# Patient Record
Sex: FEMALE | Race: WHITE | Employment: OTHER | URBAN - NONMETROPOLITAN AREA
[De-identification: names, ages, dates, MRNs, and addresses within clinical notes are randomized per-mention and may not be internally consistent; named-entity substitution may affect disease eponyms.]

---

## 2017-01-03 ENCOUNTER — OFFICE VISIT (OUTPATIENT)
Dept: FAMILY MEDICINE CLINIC | Age: 67
End: 2017-01-03

## 2017-01-03 VITALS
SYSTOLIC BLOOD PRESSURE: 132 MMHG | DIASTOLIC BLOOD PRESSURE: 88 MMHG | RESPIRATION RATE: 14 BRPM | WEIGHT: 211 LBS | HEART RATE: 72 BPM | BODY MASS INDEX: 37.39 KG/M2 | TEMPERATURE: 98.9 F | HEIGHT: 63 IN

## 2017-01-03 DIAGNOSIS — Z01.818 PRE-OP EXAM: ICD-10-CM

## 2017-01-03 DIAGNOSIS — Z86.718 HISTORY OF DVT (DEEP VEIN THROMBOSIS): ICD-10-CM

## 2017-01-03 DIAGNOSIS — M17.11 PRIMARY OSTEOARTHRITIS OF RIGHT KNEE: Primary | ICD-10-CM

## 2017-01-03 PROCEDURE — 99213 OFFICE O/P EST LOW 20 MIN: CPT | Performed by: FAMILY MEDICINE

## 2017-01-03 ASSESSMENT — PATIENT HEALTH QUESTIONNAIRE - PHQ9
SUM OF ALL RESPONSES TO PHQ QUESTIONS 1-9: 0
2. FEELING DOWN, DEPRESSED OR HOPELESS: 0
SUM OF ALL RESPONSES TO PHQ9 QUESTIONS 1 & 2: 0
1. LITTLE INTEREST OR PLEASURE IN DOING THINGS: 0

## 2017-05-24 RX ORDER — CITALOPRAM 20 MG/1
TABLET ORAL
Qty: 90 TABLET | Refills: 5 | Status: SHIPPED | OUTPATIENT
Start: 2017-05-24 | End: 2018-06-20 | Stop reason: SDUPTHER

## 2017-10-04 ENCOUNTER — HOSPITAL ENCOUNTER (OUTPATIENT)
Dept: MAMMOGRAPHY | Age: 67
Discharge: HOME | End: 2017-10-04
Payer: MEDICARE

## 2017-10-04 DIAGNOSIS — Z12.9 SCREENING FOR CANCER: ICD-10-CM

## 2017-10-04 PROCEDURE — G0202 SCR MAMMO BI INCL CAD: HCPCS

## 2017-10-05 ENCOUNTER — TELEPHONE (OUTPATIENT)
Dept: FAMILY MEDICINE CLINIC | Age: 67
End: 2017-10-05

## 2017-10-05 NOTE — TELEPHONE ENCOUNTER
----- Message from Siri DO Jessica sent at 10/5/2017  8:26 AM EDT -----  Normal mammogram, repeat in 1-2 years.

## 2017-10-16 ENCOUNTER — OFFICE VISIT (OUTPATIENT)
Dept: FAMILY MEDICINE CLINIC | Age: 67
End: 2017-10-16
Payer: MEDICARE

## 2017-10-16 ENCOUNTER — TELEPHONE (OUTPATIENT)
Dept: FAMILY MEDICINE CLINIC | Age: 67
End: 2017-10-16

## 2017-10-16 VITALS
SYSTOLIC BLOOD PRESSURE: 130 MMHG | WEIGHT: 208 LBS | BODY MASS INDEX: 36.86 KG/M2 | TEMPERATURE: 98.5 F | HEIGHT: 63 IN | HEART RATE: 55 BPM | RESPIRATION RATE: 12 BRPM | DIASTOLIC BLOOD PRESSURE: 76 MMHG

## 2017-10-16 DIAGNOSIS — M65.4 DE QUERVAIN'S TENOSYNOVITIS, LEFT: Primary | ICD-10-CM

## 2017-10-16 PROCEDURE — 4004F PT TOBACCO SCREEN RCVD TLK: CPT | Performed by: FAMILY MEDICINE

## 2017-10-16 PROCEDURE — 1090F PRES/ABSN URINE INCON ASSESS: CPT | Performed by: FAMILY MEDICINE

## 2017-10-16 PROCEDURE — 1123F ACP DISCUSS/DSCN MKR DOCD: CPT | Performed by: FAMILY MEDICINE

## 2017-10-16 PROCEDURE — G8427 DOCREV CUR MEDS BY ELIG CLIN: HCPCS | Performed by: FAMILY MEDICINE

## 2017-10-16 PROCEDURE — 4040F PNEUMOC VAC/ADMIN/RCVD: CPT | Performed by: FAMILY MEDICINE

## 2017-10-16 PROCEDURE — G8484 FLU IMMUNIZE NO ADMIN: HCPCS | Performed by: FAMILY MEDICINE

## 2017-10-16 PROCEDURE — 3017F COLORECTAL CA SCREEN DOC REV: CPT | Performed by: FAMILY MEDICINE

## 2017-10-16 PROCEDURE — 3014F SCREEN MAMMO DOC REV: CPT | Performed by: FAMILY MEDICINE

## 2017-10-16 PROCEDURE — G8400 PT W/DXA NO RESULTS DOC: HCPCS | Performed by: FAMILY MEDICINE

## 2017-10-16 PROCEDURE — 99213 OFFICE O/P EST LOW 20 MIN: CPT | Performed by: FAMILY MEDICINE

## 2017-10-16 PROCEDURE — G8417 CALC BMI ABV UP PARAM F/U: HCPCS | Performed by: FAMILY MEDICINE

## 2017-10-16 RX ORDER — PREDNISONE 20 MG/1
40 TABLET ORAL DAILY
Qty: 10 TABLET | Refills: 0 | Status: SHIPPED | OUTPATIENT
Start: 2017-10-16 | End: 2017-10-21

## 2017-10-16 RX ORDER — PREDNISONE 20 MG/1
40 TABLET ORAL DAILY
Qty: 10 TABLET | Refills: 0 | Status: SHIPPED | OUTPATIENT
Start: 2017-10-16 | End: 2017-10-16 | Stop reason: SDUPTHER

## 2017-10-16 NOTE — TELEPHONE ENCOUNTER
Pt informed. appt scheduled.   Future Appointments  Date Time Provider Betty Milton   10/16/2017 11:20 AM Sly Ha, [address] [place] [address]

## 2017-10-16 NOTE — TELEPHONE ENCOUNTER
Patient is calling stating she has hurt her left wrist while lifting flower pots she states it is swollen a little, she is wearing a wrist brace and says it does feel better if she has that on, and was not sure if she should come in and see Dr. Jose Christiansen or go to [facility].  Please advise to patient at [phone]

## 2017-10-16 NOTE — PATIENT INSTRUCTIONS
account. Enter E449 in the [facility] box to learn more about \"De Quervain's Tenosynovitis: Care Instructions. \"     If you do not have an account, please click on the \"Sign Up Now\" link. Current as of: May 23, 2016  Content Version: 11.3  © 8176-0520 [employer], [employer]book. Care instructions adapted under license by [employer] [facility]1006.tv [facility] ([facility]). If you have questions about a medical condition or this instruction, always ask your healthcare professional. [place] [address] any warranty or liability for your use of this information. Patient Education        Chilo Johnson Disease: Exercises  Your Care Instructions  Here are some examples of typical rehabilitation exercises for your condition. Start each exercise slowly. Ease off the exercise if you start to have pain. Your doctor or your physical or occupational therapist will tell you when you can start these exercises and which ones will work best for you. How to do the exercises  Thumb lifts    1. Place your hand on a flat surface, with your palm up. 2. Lift your thumb away from your palm to make a \"C\" shape. 3. Hold for about 6 seconds. 4. Repeat 8 to 12 times. Passive thumb MP flexion    1. Hold your hand in front of you, and turn your hand so your little finger faces down and your thumb faces up. (Your hand should be in the position used for shaking someone's hand.) You may also rest your hand on a flat surface. 2. Use the fingers on your other hand to bend your thumb down at the point where your thumb connects to your palm. 3. Hold for at least 15 to 30 seconds. 4. Repeat 2 to 4 times. Finkelstein stretch    1. Hold your arms out in front of you. (Your hand should be in the position used for shaking someone's hand.)  2. Bend your thumb toward your palm.   3. Use your other hand to gently stretch your thumb and wrist downward until you feel the stretch on the thumb side of your wrist.  4. Hold for at least 15 to 30 seconds. 5. Repeat 2 to 4 times. Resisted ulnar deviation    Note: For this exercise, you will need elastic exercise material, such as surgical tubing or Thera-Band. 1. Sit leaning forward with your legs slightly spread and your elbow on your thigh. 2. Grasp one end of the band with your palm down, and step on the other end with the foot opposite the hand holding the band. 3. Slowly bend your wrist sideways and away from your knee. 4. Repeat 8 to 12 times. Follow-up care is a key part of your treatment and safety. Be sure to make and go to all appointments, and call your doctor if you are having problems. It's also a good idea to know your test results and keep a list of the medicines you take. Where can you learn more? Go to [email or web address]://[email or web address].Mobiplex. org and sign in to your Next[employer] account. Enter V976 in the [email or web address] box to learn more about \"De Quervain's Disease: Exercises. \"     If you do not have an account, please click on the \"Sign Up Now\" link. Current as of: March 21, 2017  Content Version: 11.3  © 5454-4658 [employer]. Care instructions adapted under license by [facility] ([facility]). If you have questions about a medical condition or this instruction, always ask your healthcare professional. [address] any warranty or liability for your use of this information.

## 2017-10-19 NOTE — TELEPHONE ENCOUNTER
Placed call to [address] [place] [address] at [phone] and spoke with pharmacy tech. She states that the pt does not have medication drug coverage on file at the pharmacy. Paid $5 out of pocket for medication. PA no longer needed. Encounter closed.  10/19/17

## 2018-06-20 ENCOUNTER — OFFICE VISIT (OUTPATIENT)
Dept: FAMILY MEDICINE CLINIC | Age: 68
End: 2018-06-20
Payer: MEDICARE

## 2018-06-20 VITALS
HEIGHT: 63 IN | TEMPERATURE: 98.8 F | DIASTOLIC BLOOD PRESSURE: 74 MMHG | RESPIRATION RATE: 12 BRPM | SYSTOLIC BLOOD PRESSURE: 132 MMHG | HEART RATE: 80 BPM | BODY MASS INDEX: 37.92 KG/M2 | WEIGHT: 214 LBS

## 2018-06-20 DIAGNOSIS — R22.9 SKIN MASS: Primary | ICD-10-CM

## 2018-06-20 DIAGNOSIS — F41.9 ANXIETY: ICD-10-CM

## 2018-06-20 DIAGNOSIS — D49.2 NEOPLASM OF UNSPECIFIED BEHAVIOR OF BONE, SOFT TISSUE, AND SKIN: ICD-10-CM

## 2018-06-20 PROCEDURE — 11306 SHAVE SKIN LESION 0.6-1.0 CM: CPT | Performed by: FAMILY MEDICINE

## 2018-06-20 PROCEDURE — 1090F PRES/ABSN URINE INCON ASSESS: CPT | Performed by: FAMILY MEDICINE

## 2018-06-20 PROCEDURE — 1123F ACP DISCUSS/DSCN MKR DOCD: CPT | Performed by: FAMILY MEDICINE

## 2018-06-20 PROCEDURE — G8427 DOCREV CUR MEDS BY ELIG CLIN: HCPCS | Performed by: FAMILY MEDICINE

## 2018-06-20 PROCEDURE — 99213 OFFICE O/P EST LOW 20 MIN: CPT | Performed by: FAMILY MEDICINE

## 2018-06-20 PROCEDURE — 4004F PT TOBACCO SCREEN RCVD TLK: CPT | Performed by: FAMILY MEDICINE

## 2018-06-20 PROCEDURE — G8417 CALC BMI ABV UP PARAM F/U: HCPCS | Performed by: FAMILY MEDICINE

## 2018-06-20 PROCEDURE — 4040F PNEUMOC VAC/ADMIN/RCVD: CPT | Performed by: FAMILY MEDICINE

## 2018-06-20 PROCEDURE — 3017F COLORECTAL CA SCREEN DOC REV: CPT | Performed by: FAMILY MEDICINE

## 2018-06-20 PROCEDURE — G8400 PT W/DXA NO RESULTS DOC: HCPCS | Performed by: FAMILY MEDICINE

## 2018-06-20 RX ORDER — CHOLECALCIFEROL (VITAMIN D3) 125 MCG
500 CAPSULE ORAL DAILY
COMMUNITY
End: 2022-07-20

## 2018-06-20 RX ORDER — CITALOPRAM 20 MG/1
TABLET ORAL
Qty: 90 TABLET | Refills: 3 | Status: SHIPPED | OUTPATIENT
Start: 2018-06-20 | End: 2019-02-12 | Stop reason: SDUPTHER

## 2018-08-16 ENCOUNTER — NURSE ONLY (OUTPATIENT)
Dept: FAMILY MEDICINE CLINIC | Age: 68
End: 2018-08-16
Payer: MEDICARE

## 2018-08-16 DIAGNOSIS — Z23 NEED FOR SHINGLES VACCINE: Primary | ICD-10-CM

## 2018-08-16 PROCEDURE — 90750 HZV VACC RECOMBINANT IM: CPT | Performed by: FAMILY MEDICINE

## 2018-08-16 PROCEDURE — 90471 IMMUNIZATION ADMIN: CPT | Performed by: FAMILY MEDICINE

## 2018-08-16 NOTE — PROGRESS NOTES
Patient instructed to report any adverse reaction to me immediately. Most recent Vaccine Information Sheet dated 02/12/18 given to pt.    Immunizations     Name Date Dose Route    Zoster Subunit (Shingrix) 8/16/2018 0.5 mL Intramuscular    Site: Deltoid- Left    Lot: [id number]     NDC: 33567-155-77

## 2018-10-15 ENCOUNTER — OFFICE VISIT (OUTPATIENT)
Dept: FAMILY MEDICINE CLINIC | Age: 68
End: 2018-10-15
Payer: MEDICARE

## 2018-10-15 VITALS
HEART RATE: 64 BPM | RESPIRATION RATE: 16 BRPM | SYSTOLIC BLOOD PRESSURE: 144 MMHG | DIASTOLIC BLOOD PRESSURE: 88 MMHG | BODY MASS INDEX: 37.05 KG/M2 | WEIGHT: 217 LBS | HEIGHT: 64 IN | TEMPERATURE: 97.9 F

## 2018-10-15 DIAGNOSIS — Z23 NEED FOR 23-POLYVALENT PNEUMOCOCCAL POLYSACCHARIDE VACCINE: ICD-10-CM

## 2018-10-15 DIAGNOSIS — Z23 NEED FOR IMMUNIZATION AGAINST INFLUENZA: ICD-10-CM

## 2018-10-15 DIAGNOSIS — Z00.00 ROUTINE GENERAL MEDICAL EXAMINATION AT A HEALTH CARE FACILITY: Primary | ICD-10-CM

## 2018-10-15 DIAGNOSIS — M77.12 LATERAL EPICONDYLITIS OF LEFT ELBOW: ICD-10-CM

## 2018-10-15 PROCEDURE — G0009 ADMIN PNEUMOCOCCAL VACCINE: HCPCS | Performed by: FAMILY MEDICINE

## 2018-10-15 PROCEDURE — 90732 PPSV23 VACC 2 YRS+ SUBQ/IM: CPT | Performed by: FAMILY MEDICINE

## 2018-10-15 PROCEDURE — 90686 IIV4 VACC NO PRSV 0.5 ML IM: CPT | Performed by: FAMILY MEDICINE

## 2018-10-15 PROCEDURE — G0008 ADMIN INFLUENZA VIRUS VAC: HCPCS | Performed by: FAMILY MEDICINE

## 2018-10-15 PROCEDURE — G0439 PPPS, SUBSEQ VISIT: HCPCS | Performed by: FAMILY MEDICINE

## 2018-10-15 PROCEDURE — G8482 FLU IMMUNIZE ORDER/ADMIN: HCPCS | Performed by: FAMILY MEDICINE

## 2018-10-15 PROCEDURE — 4040F PNEUMOC VAC/ADMIN/RCVD: CPT | Performed by: FAMILY MEDICINE

## 2018-10-15 RX ORDER — PREDNISONE 20 MG/1
40 TABLET ORAL DAILY
Qty: 10 TABLET | Refills: 0 | Status: SHIPPED | OUTPATIENT
Start: 2018-10-15 | End: 2018-10-20

## 2018-10-15 ASSESSMENT — ANXIETY QUESTIONNAIRES: GAD7 TOTAL SCORE: 0

## 2018-10-15 ASSESSMENT — LIFESTYLE VARIABLES
HOW OFTEN DO YOU HAVE A DRINK CONTAINING ALCOHOL: 1
HOW OFTEN DURING THE LAST YEAR HAVE YOU FOUND THAT YOU WERE NOT ABLE TO STOP DRINKING ONCE YOU HAD STARTED: 0
HOW MANY STANDARD DRINKS CONTAINING ALCOHOL DO YOU HAVE ON A TYPICAL DAY: 0
HAVE YOU OR SOMEONE ELSE BEEN INJURED AS A RESULT OF YOUR DRINKING: 0
AUDIT-C TOTAL SCORE: 1
HOW OFTEN DURING THE LAST YEAR HAVE YOU BEEN UNABLE TO REMEMBER WHAT HAPPENED THE NIGHT BEFORE BECAUSE YOU HAD BEEN DRINKING: 0
HOW OFTEN DO YOU HAVE SIX OR MORE DRINKS ON ONE OCCASION: 0
HAS A RELATIVE, FRIEND, DOCTOR, OR ANOTHER HEALTH PROFESSIONAL EXPRESSED CONCERN ABOUT YOUR DRINKING OR SUGGESTED YOU CUT DOWN: 0
HOW OFTEN DURING THE LAST YEAR HAVE YOU HAD A FEELING OF GUILT OR REMORSE AFTER DRINKING: 0
AUDIT TOTAL SCORE: 1
HOW OFTEN DURING THE LAST YEAR HAVE YOU NEEDED AN ALCOHOLIC DRINK FIRST THING IN THE MORNING TO GET YOURSELF GOING AFTER A NIGHT OF HEAVY DRINKING: 0
HOW OFTEN DURING THE LAST YEAR HAVE YOU FAILED TO DO WHAT WAS NORMALLY EXPECTED FROM YOU BECAUSE OF DRINKING: 0

## 2018-10-15 ASSESSMENT — PATIENT HEALTH QUESTIONNAIRE - PHQ9
SUM OF ALL RESPONSES TO PHQ QUESTIONS 1-9: 0
SUM OF ALL RESPONSES TO PHQ QUESTIONS 1-9: 0

## 2018-10-15 NOTE — PROGRESS NOTES
Visit Information    Have you changed or started any medications since your last visit including any over-the-counter medicines, vitamins, or herbal medicines? no   Are you having any side effects from any of your medications? -  no  Have you stopped taking any of your medications? Is so, why? -  no    Have you seen any other physician or provider since your last visit? No  Have you had any other diagnostic tests since your last visit? No  Have you been seen in the emergency room and/or had an admission to a hospital since we last saw you? No  Have you had your routine dental cleaning in the past 6 months? yes - routine     Have you activated your eWise account? If not, what are your barriers?  Yes     Patient Care Team:  Benji Macario,  as PCP - General (Family Medicine)  Benji Macario,  as PCP - S Attributed Provider    Medical History Review  Past Medical, Family, and Social History reviewed and does contribute to the patient presenting condition    Health Maintenance   Topic Date Due    Hepatitis C screen  1950    Lipid screen  10/22/1991    Diabetes screen  10/22/1991    DTaP/Tdap/Td vaccine (1 - Tdap) 06/08/2015    Flu vaccine (1) 09/13/2018    Pneumococcal low/med risk (2 of 2 - PPSV23) 10/04/2018    Shingles Vaccine (2 of 2 - 2 Dose Series) 02/16/2019    Breast cancer screen  10/04/2019    Colon cancer screen colonoscopy  12/21/2021    DEXA (modify frequency per FRAX score)  Completed

## 2018-10-15 NOTE — PATIENT INSTRUCTIONS
Personalized Preventive Plan for Anup Patel - 10/15/2018  Medicare offers a range of preventive health benefits. Some of the tests and screenings are paid in full while other may be subject to a deductible, co-insurance, and/or copay. Some of these benefits include a comprehensive review of your medical history including lifestyle, illnesses that may run in your family, and various assessments and screenings as appropriate. After reviewing your medical record and screening and assessments performed today your provider may have ordered immunizations, labs, imaging, and/or referrals for you. A list of these orders (if applicable) as well as your Preventive Care list are included within your After Visit Summary for your review. Other Preventive Recommendations:    · A preventive eye exam performed by an eye specialist is recommended every 1-2 years to screen for glaucoma; cataracts, macular degeneration, and other eye disorders. · A preventive dental visit is recommended every 6 months. · Try to get at least 150 minutes of exercise per week or 10,000 steps per day on a pedometer . · Order or download the FREE \"Exercise & Physical Activity: Your Everyday Guide\" from [employer]. Call 9-722.462.5213 or search The [employer] online. · You need 0122-4864 mg of calcium and 5240-9409 IU of vitamin D per day. It is possible to meet your calcium requirement with diet alone, but a vitamin D supplement is usually necessary to meet this goal.  · When exposed to the sun, use a sunscreen that protects against both UVA and UVB radiation with an SPF of 30 or greater. Reapply every 2 to 3 hours or after sweating, drying off with a towel, or swimming. · Always wear a seat belt when traveling in a car. Always wear a helmet when riding a bicycle or motorcycle.

## 2018-10-24 ENCOUNTER — HOSPITAL ENCOUNTER (OUTPATIENT)
Dept: MAMMOGRAPHY | Age: 68
Discharge: HOME | End: 2018-10-24
Payer: MEDICARE

## 2018-10-24 DIAGNOSIS — Z12.39 SCREENING FOR BREAST CANCER: ICD-10-CM

## 2018-10-24 PROCEDURE — 77067 SCR MAMMO BI INCL CAD: CPT

## 2019-01-16 ENCOUNTER — OFFICE VISIT (OUTPATIENT)
Dept: FAMILY MEDICINE CLINIC | Age: 69
End: 2019-01-16
Payer: MEDICARE

## 2019-01-16 ENCOUNTER — NURSE ONLY (OUTPATIENT)
Dept: LAB | Age: 69
End: 2019-01-16

## 2019-01-16 VITALS
HEART RATE: 76 BPM | WEIGHT: 226.8 LBS | BODY MASS INDEX: 37.79 KG/M2 | HEIGHT: 65 IN | DIASTOLIC BLOOD PRESSURE: 82 MMHG | RESPIRATION RATE: 16 BRPM | SYSTOLIC BLOOD PRESSURE: 138 MMHG | TEMPERATURE: 98.3 F

## 2019-01-16 DIAGNOSIS — M25.50 ARTHRALGIA, UNSPECIFIED JOINT: ICD-10-CM

## 2019-01-16 DIAGNOSIS — R53.82 CHRONIC FATIGUE: ICD-10-CM

## 2019-01-16 DIAGNOSIS — R53.82 CHRONIC FATIGUE: Primary | ICD-10-CM

## 2019-01-16 LAB
ALBUMIN SERPL-MCNC: 4.2 G/DL (ref 3.5–5.1)
ALP BLD-CCNC: 92 U/L (ref 38–126)
ALT SERPL-CCNC: 11 U/L (ref 11–66)
ANION GAP SERPL CALCULATED.3IONS-SCNC: 11 MEQ/L (ref 8–16)
AST SERPL-CCNC: 13 U/L (ref 5–40)
BASOPHILS # BLD: 1.2 %
BASOPHILS ABSOLUTE: 0.1 THOU/MM3 (ref 0–0.1)
BILIRUB SERPL-MCNC: 0.4 MG/DL (ref 0.3–1.2)
BUN BLDV-MCNC: 10 MG/DL (ref 7–22)
CALCIUM SERPL-MCNC: 9.3 MG/DL (ref 8.5–10.5)
CHLORIDE BLD-SCNC: 103 MEQ/L (ref 98–111)
CO2: 28 MEQ/L (ref 23–33)
CREAT SERPL-MCNC: 0.8 MG/DL (ref 0.4–1.2)
EOSINOPHIL # BLD: 3.9 %
EOSINOPHILS ABSOLUTE: 0.2 THOU/MM3 (ref 0–0.4)
ERYTHROCYTE [DISTWIDTH] IN BLOOD BY AUTOMATED COUNT: 13.3 % (ref 11.5–14.5)
ERYTHROCYTE [DISTWIDTH] IN BLOOD BY AUTOMATED COUNT: 45.6 FL (ref 35–45)
GFR SERPL CREATININE-BSD FRML MDRD: 71 ML/MIN/1.73M2
GLUCOSE BLD-MCNC: 99 MG/DL (ref 70–108)
HCT VFR BLD CALC: 43.6 % (ref 37–47)
HEMOGLOBIN: 14.1 GM/DL (ref 12–16)
IMMATURE GRANS (ABS): 0.01 THOU/MM3 (ref 0–0.07)
IMMATURE GRANULOCYTES: 0.2 %
LYMPHOCYTES # BLD: 29.9 %
LYMPHOCYTES ABSOLUTE: 1.4 THOU/MM3 (ref 1–4.8)
MCH RBC QN AUTO: 30.5 PG (ref 26–33)
MCHC RBC AUTO-ENTMCNC: 32.3 GM/DL (ref 32.2–35.5)
MCV RBC AUTO: 94.2 FL (ref 81–99)
MONOCYTES # BLD: 7.1 %
MONOCYTES ABSOLUTE: 0.3 THOU/MM3 (ref 0.4–1.3)
NUCLEATED RED BLOOD CELLS: 0 /100 WBC
PLATELET # BLD: 231 THOU/MM3 (ref 130–400)
PMV BLD AUTO: 10.8 FL (ref 9.4–12.4)
POTASSIUM SERPL-SCNC: 5.2 MEQ/L (ref 3.5–5.2)
RBC # BLD: 4.63 MILL/MM3 (ref 4.2–5.4)
SEG NEUTROPHILS: 57.7 %
SEGMENTED NEUTROPHILS ABSOLUTE COUNT: 2.8 THOU/MM3 (ref 1.8–7.7)
SODIUM BLD-SCNC: 142 MEQ/L (ref 135–145)
TOTAL PROTEIN: 6.5 G/DL (ref 6.1–8)
TSH SERPL DL<=0.05 MIU/L-ACNC: 3.08 UIU/ML (ref 0.4–4.2)
WBC # BLD: 4.8 THOU/MM3 (ref 4.8–10.8)

## 2019-01-16 PROCEDURE — 1101F PT FALLS ASSESS-DOCD LE1/YR: CPT | Performed by: FAMILY MEDICINE

## 2019-01-16 PROCEDURE — 1090F PRES/ABSN URINE INCON ASSESS: CPT | Performed by: FAMILY MEDICINE

## 2019-01-16 PROCEDURE — G8482 FLU IMMUNIZE ORDER/ADMIN: HCPCS | Performed by: FAMILY MEDICINE

## 2019-01-16 PROCEDURE — G8417 CALC BMI ABV UP PARAM F/U: HCPCS | Performed by: FAMILY MEDICINE

## 2019-01-16 PROCEDURE — G8427 DOCREV CUR MEDS BY ELIG CLIN: HCPCS | Performed by: FAMILY MEDICINE

## 2019-01-16 PROCEDURE — 1123F ACP DISCUSS/DSCN MKR DOCD: CPT | Performed by: FAMILY MEDICINE

## 2019-01-16 PROCEDURE — 3017F COLORECTAL CA SCREEN DOC REV: CPT | Performed by: FAMILY MEDICINE

## 2019-01-16 PROCEDURE — 1036F TOBACCO NON-USER: CPT | Performed by: FAMILY MEDICINE

## 2019-01-16 PROCEDURE — G8400 PT W/DXA NO RESULTS DOC: HCPCS | Performed by: FAMILY MEDICINE

## 2019-01-16 PROCEDURE — 99214 OFFICE O/P EST MOD 30 MIN: CPT | Performed by: FAMILY MEDICINE

## 2019-01-16 PROCEDURE — 4040F PNEUMOC VAC/ADMIN/RCVD: CPT | Performed by: FAMILY MEDICINE

## 2019-01-18 ENCOUNTER — TELEPHONE (OUTPATIENT)
Dept: FAMILY MEDICINE CLINIC | Age: 69
End: 2019-01-18

## 2019-01-18 DIAGNOSIS — R53.83 OTHER FATIGUE: Primary | ICD-10-CM

## 2019-01-18 LAB — ANA SCREEN: NORMAL

## 2019-01-18 RX ORDER — AMITRIPTYLINE HYDROCHLORIDE 25 MG/1
25 TABLET, FILM COATED ORAL NIGHTLY
Qty: 30 TABLET | Refills: 5 | Status: SHIPPED | OUTPATIENT
Start: 2019-01-18 | End: 2019-02-12 | Stop reason: SDUPTHER

## 2019-02-12 ENCOUNTER — OFFICE VISIT (OUTPATIENT)
Dept: FAMILY MEDICINE CLINIC | Age: 69
End: 2019-02-12
Payer: MEDICARE

## 2019-02-12 VITALS
BODY MASS INDEX: 38.49 KG/M2 | HEIGHT: 65 IN | DIASTOLIC BLOOD PRESSURE: 86 MMHG | TEMPERATURE: 98.2 F | RESPIRATION RATE: 16 BRPM | HEART RATE: 71 BPM | SYSTOLIC BLOOD PRESSURE: 136 MMHG | WEIGHT: 231 LBS

## 2019-02-12 DIAGNOSIS — Z23 NEED FOR VACCINATION FOR ZOSTER: ICD-10-CM

## 2019-02-12 DIAGNOSIS — R53.83 OTHER FATIGUE: Primary | ICD-10-CM

## 2019-02-12 DIAGNOSIS — K21.9 GASTROESOPHAGEAL REFLUX DISEASE WITHOUT ESOPHAGITIS: ICD-10-CM

## 2019-02-12 DIAGNOSIS — F41.9 ANXIETY: ICD-10-CM

## 2019-02-12 PROCEDURE — 3017F COLORECTAL CA SCREEN DOC REV: CPT | Performed by: FAMILY MEDICINE

## 2019-02-12 PROCEDURE — 1036F TOBACCO NON-USER: CPT | Performed by: FAMILY MEDICINE

## 2019-02-12 PROCEDURE — 1090F PRES/ABSN URINE INCON ASSESS: CPT | Performed by: FAMILY MEDICINE

## 2019-02-12 PROCEDURE — G8400 PT W/DXA NO RESULTS DOC: HCPCS | Performed by: FAMILY MEDICINE

## 2019-02-12 PROCEDURE — G8427 DOCREV CUR MEDS BY ELIG CLIN: HCPCS | Performed by: FAMILY MEDICINE

## 2019-02-12 PROCEDURE — 90750 HZV VACC RECOMBINANT IM: CPT | Performed by: FAMILY MEDICINE

## 2019-02-12 PROCEDURE — 4040F PNEUMOC VAC/ADMIN/RCVD: CPT | Performed by: FAMILY MEDICINE

## 2019-02-12 PROCEDURE — 1101F PT FALLS ASSESS-DOCD LE1/YR: CPT | Performed by: FAMILY MEDICINE

## 2019-02-12 PROCEDURE — G8417 CALC BMI ABV UP PARAM F/U: HCPCS | Performed by: FAMILY MEDICINE

## 2019-02-12 PROCEDURE — 1123F ACP DISCUSS/DSCN MKR DOCD: CPT | Performed by: FAMILY MEDICINE

## 2019-02-12 PROCEDURE — 99214 OFFICE O/P EST MOD 30 MIN: CPT | Performed by: FAMILY MEDICINE

## 2019-02-12 PROCEDURE — G8482 FLU IMMUNIZE ORDER/ADMIN: HCPCS | Performed by: FAMILY MEDICINE

## 2019-02-12 PROCEDURE — 90471 IMMUNIZATION ADMIN: CPT | Performed by: FAMILY MEDICINE

## 2019-02-12 RX ORDER — CITALOPRAM 20 MG/1
TABLET ORAL
Qty: 90 TABLET | Refills: 3 | Status: SHIPPED | OUTPATIENT
Start: 2019-02-12 | End: 2020-02-14

## 2019-02-12 RX ORDER — AMITRIPTYLINE HYDROCHLORIDE 25 MG/1
TABLET, FILM COATED ORAL
Qty: 60 TABLET | Refills: 5 | Status: SHIPPED | OUTPATIENT
Start: 2019-02-12 | End: 2020-10-20

## 2019-02-12 ASSESSMENT — PATIENT HEALTH QUESTIONNAIRE - PHQ9
1. LITTLE INTEREST OR PLEASURE IN DOING THINGS: 0
SUM OF ALL RESPONSES TO PHQ QUESTIONS 1-9: 0
SUM OF ALL RESPONSES TO PHQ QUESTIONS 1-9: 0
2. FEELING DOWN, DEPRESSED OR HOPELESS: 0
SUM OF ALL RESPONSES TO PHQ9 QUESTIONS 1 & 2: 0

## 2019-10-16 ENCOUNTER — OFFICE VISIT (OUTPATIENT)
Dept: FAMILY MEDICINE CLINIC | Age: 69
End: 2019-10-16
Payer: MEDICARE

## 2019-10-16 VITALS
SYSTOLIC BLOOD PRESSURE: 118 MMHG | HEART RATE: 95 BPM | WEIGHT: 230 LBS | TEMPERATURE: 98.3 F | BODY MASS INDEX: 40.75 KG/M2 | DIASTOLIC BLOOD PRESSURE: 77 MMHG | RESPIRATION RATE: 10 BRPM | HEIGHT: 63 IN

## 2019-10-16 DIAGNOSIS — E66.01 MORBID OBESITY WITH BMI OF 40.0-44.9, ADULT (HCC): ICD-10-CM

## 2019-10-16 DIAGNOSIS — Z23 NEEDS FLU SHOT: ICD-10-CM

## 2019-10-16 DIAGNOSIS — Z00.00 ROUTINE GENERAL MEDICAL EXAMINATION AT A HEALTH CARE FACILITY: Primary | ICD-10-CM

## 2019-10-16 PROCEDURE — 90653 IIV ADJUVANT VACCINE IM: CPT | Performed by: FAMILY MEDICINE

## 2019-10-16 PROCEDURE — 1123F ACP DISCUSS/DSCN MKR DOCD: CPT | Performed by: FAMILY MEDICINE

## 2019-10-16 PROCEDURE — G8482 FLU IMMUNIZE ORDER/ADMIN: HCPCS | Performed by: FAMILY MEDICINE

## 2019-10-16 PROCEDURE — G0008 ADMIN INFLUENZA VIRUS VAC: HCPCS | Performed by: FAMILY MEDICINE

## 2019-10-16 PROCEDURE — G0438 PPPS, INITIAL VISIT: HCPCS | Performed by: FAMILY MEDICINE

## 2019-10-16 PROCEDURE — 3017F COLORECTAL CA SCREEN DOC REV: CPT | Performed by: FAMILY MEDICINE

## 2019-10-16 PROCEDURE — 4040F PNEUMOC VAC/ADMIN/RCVD: CPT | Performed by: FAMILY MEDICINE

## 2019-10-16 ASSESSMENT — LIFESTYLE VARIABLES: HOW OFTEN DO YOU HAVE A DRINK CONTAINING ALCOHOL: 0

## 2019-10-16 ASSESSMENT — PATIENT HEALTH QUESTIONNAIRE - PHQ9
SUM OF ALL RESPONSES TO PHQ QUESTIONS 1-9: 0
SUM OF ALL RESPONSES TO PHQ QUESTIONS 1-9: 0

## 2019-10-25 ENCOUNTER — HOSPITAL ENCOUNTER (OUTPATIENT)
Dept: MAMMOGRAPHY | Age: 69
Discharge: HOME | End: 2019-10-25
Payer: MEDICARE

## 2019-10-25 DIAGNOSIS — Z12.39 BREAST SCREENING: ICD-10-CM

## 2019-10-25 PROCEDURE — 77063 BREAST TOMOSYNTHESIS BI: CPT

## 2020-02-14 RX ORDER — CITALOPRAM 20 MG/1
TABLET ORAL
Qty: 90 TABLET | Refills: 3 | Status: SHIPPED | OUTPATIENT
Start: 2020-02-14 | End: 2021-02-18

## 2020-10-20 ENCOUNTER — OFFICE VISIT (OUTPATIENT)
Dept: FAMILY MEDICINE CLINIC | Age: 70
End: 2020-10-20
Payer: MEDICARE

## 2020-10-20 VITALS
BODY MASS INDEX: 39.94 KG/M2 | SYSTOLIC BLOOD PRESSURE: 132 MMHG | HEIGHT: 63 IN | OXYGEN SATURATION: 98 % | DIASTOLIC BLOOD PRESSURE: 84 MMHG | WEIGHT: 225.4 LBS | RESPIRATION RATE: 18 BRPM | HEART RATE: 88 BPM | TEMPERATURE: 97.8 F

## 2020-10-20 PROBLEM — E66.01 MORBID OBESITY WITH BMI OF 40.0-44.9, ADULT (HCC): Status: ACTIVE | Noted: 2020-10-20

## 2020-10-20 PROCEDURE — 3017F COLORECTAL CA SCREEN DOC REV: CPT | Performed by: FAMILY MEDICINE

## 2020-10-20 PROCEDURE — G0008 ADMIN INFLUENZA VIRUS VAC: HCPCS | Performed by: FAMILY MEDICINE

## 2020-10-20 PROCEDURE — 90694 VACC AIIV4 NO PRSRV 0.5ML IM: CPT | Performed by: FAMILY MEDICINE

## 2020-10-20 PROCEDURE — G0439 PPPS, SUBSEQ VISIT: HCPCS | Performed by: FAMILY MEDICINE

## 2020-10-20 PROCEDURE — 4040F PNEUMOC VAC/ADMIN/RCVD: CPT | Performed by: FAMILY MEDICINE

## 2020-10-20 PROCEDURE — 1123F ACP DISCUSS/DSCN MKR DOCD: CPT | Performed by: FAMILY MEDICINE

## 2020-10-20 PROCEDURE — G8484 FLU IMMUNIZE NO ADMIN: HCPCS | Performed by: FAMILY MEDICINE

## 2020-10-20 RX ORDER — MELOXICAM 15 MG/1
15 TABLET ORAL DAILY
Qty: 90 TABLET | Refills: 3 | Status: SHIPPED | OUTPATIENT
Start: 2020-10-20 | End: 2020-12-09

## 2020-10-20 ASSESSMENT — PATIENT HEALTH QUESTIONNAIRE - PHQ9
1. LITTLE INTEREST OR PLEASURE IN DOING THINGS: 0
SUM OF ALL RESPONSES TO PHQ QUESTIONS 1-9: 0
2. FEELING DOWN, DEPRESSED OR HOPELESS: 0
SUM OF ALL RESPONSES TO PHQ QUESTIONS 1-9: 0
SUM OF ALL RESPONSES TO PHQ9 QUESTIONS 1 & 2: 0
SUM OF ALL RESPONSES TO PHQ QUESTIONS 1-9: 0

## 2020-10-20 NOTE — PROGRESS NOTES
Vaccine Information Sheet, \"Influenza - Inactivated\"  given to Lelia Bowens, or parent/legal guardian of  Lelia Bowens and verbalized understanding. Patient responses:    Have you ever had a reaction to a flu vaccine? No  Do you have an allergy to eggs, neomycin or polymixin? No  Do you have an allergy to Thimerosal, contact lens solution, or Merthiolate? No  Have you ever had Guillian Latonia Syndrome? No  Do you have any current illness? No  Do you have a temperature above 100 degrees? No  Are you pregnant? No  If pregnant, permission obtained from physician? No  Do you have an active neurological disorder? No      Flu vaccine given per order. Please see immunization tab.

## 2020-10-20 NOTE — PATIENT INSTRUCTIONS
Personalized Preventive Plan for Robert Arriola - 10/20/2020  Medicare offers a range of preventive health benefits. Some of the tests and screenings are paid in full while other may be subject to a deductible, co-insurance, and/or copay. Some of these benefits include a comprehensive review of your medical history including lifestyle, illnesses that may run in your family, and various assessments and screenings as appropriate. After reviewing your medical record and screening and assessments performed today your provider may have ordered immunizations, labs, imaging, and/or referrals for you. A list of these orders (if applicable) as well as your Preventive Care list are included within your After Visit Summary for your review. Other Preventive Recommendations:    · A preventive eye exam performed by an eye specialist is recommended every 1-2 years to screen for glaucoma; cataracts, macular degeneration, and other eye disorders. · A preventive dental visit is recommended every 6 months. · Try to get at least 150 minutes of exercise per week or 10,000 steps per day on a pedometer . · Order or download the FREE \"Exercise & Physical Activity: Your Everyday Guide\" from [employer]. Call 7-494.658.5485 or search [employer] online. · You need 1808-4886 mg of calcium and 8905-8344 IU of vitamin D per day. It is possible to meet your calcium requirement with diet alone, but a vitamin D supplement is usually necessary to meet this goal.  · When exposed to the sun, use a sunscreen that protects against both UVA and UVB radiation with an SPF of 30 or greater. Reapply every 2 to 3 hours or after sweating, drying off with a towel, or swimming. · Always wear a seat belt when traveling in a car. Always wear a helmet when riding a bicycle or motorcycle.

## 2020-10-20 NOTE — PROGRESS NOTES
Medicare Annual Wellness Visit  Name: Mendel Curd Date: 10/20/2020   MRN: [id number] Sex: Female   Age: 77 y.o. Ethnicity: Non-/Non    : 1950 Race: Isaías Allen is here for Annual Exam and Leg Swelling (left leg swelling in hip and foot and achy legs in both hips and legs started a couple months  no meds taken   had a hip replacement on left side its the buttox muscle and has burning sensation  )    Screenings for behavioral, psychosocial and functional/safety risks, and cognitive dysfunction are all negative except as indicated below. These results, as well as other patient data from the [address] form, are documented in Flowsheets linked to this Encounter. No Known Allergies      Prior to Visit Medications    Medication Sig Taking? Authorizing Provider   meloxicam (MOBIC) 15 MG tablet Take 1 tablet by mouth daily Yes Naren Almanza, DO   citalopram (CELEXA) 20 MG tablet TAKE 1 TABLET BY MOUTH ONCE DAILY Yes Ned Almanza,    vitamin B-12 (CYANOCOBALAMIN) 500 MCG tablet Take 500 mcg by mouth daily Yes Historical Provider, MD   aspirin 81 MG tablet Take 81 mg by mouth daily. Yes Historical Provider, MD   folic acid (FOLVITE) 1 MG tablet Take 1 mg by mouth daily. Yes Historical Provider, MD   melatonin 3 MG TABS tablet Take 3 mg by mouth daily. Yes Historical Provider, MD   Calcium Carb-Cholecalciferol (CALCIUM + D3) 600-200 MG-UNIT TABS Take 2 tablets by mouth daily.  Yes Historical Provider, MD         Past Medical History:   Diagnosis Date    Grief reaction        Past Surgical History:   Procedure Laterality Date    CARPAL TUNNEL RELEASE Left     left    FOOT SURGERY Right     FOOT SURGERY Left     HYSTERECTOMY      AUB    JOINT REPLACEMENT Left 2007    left hip    KNEE SURGERY Right     ROTATOR CUFF REPAIR Right 2009    right    SINUS SURGERY           Family History   Problem Relation Age of Onset    Cancer Mother        CareTebalwinder (Including outside providers/suppliers regularly involved in providing care):   Patient Care Team:  Maru Perrin DO as PCP - General (Family Medicine)  Maru Perrin DO as PCP - [facility] Empaneled Provider    Wt Readings from Last 3 Encounters:   10/20/20 225 lb 6.4 oz (102.2 kg)   10/16/19 230 lb (104.3 kg)   02/12/19 231 lb (104.8 kg)     Vitals:    10/20/20 1403   BP: 132/84   Pulse: 88   Resp: 18   Temp: 97.8 °F (36.6 °C)   SpO2: 98%   Weight: 225 lb 6.4 oz (102.2 kg)   Height: 5' 3\" (1.6 m)     Body mass index is 39.93 kg/m². Based upon direct observation of the patient, evaluation of cognition reveals recent and remote memory intact. Patient's complete Health Risk Assessment and screening values have been reviewed and are found in Flowsheets. The following problems were reviewed today and where indicated follow up appointments were made and/or referrals ordered. Positive Risk Factor Screenings with Interventions:     Fall Risk:  Timed Up and Go Test > 12 seconds? (Complete if either Fall Risk answers are Yes): (!) yes  2 or more falls in past year?: (!) yes  Fall with injury in past year?: no  Fall Risk Interventions:    · Home safety tips provided    General Health and ACP:  General  In general, how would you say your health is?: Good  In the past 7 days, have you experienced any of the following?  New or Increased Pain, New or Increased Fatigue, Loneliness, Social Isolation, Stress or Anger?: (!) New or Increased Pain, Anger, Stress  Do you get the social and emotional support that you need?: Yes  Do you have a Living Will?: Yes  Advance Directives     Power of  Living Will ACP-Advance Directive ACP-Power of     Not on File [place] on 01/18/18 Filed [address] [facility] Adriana Salmon Risk Interventions:  · discussed in other progress note    Health Habits/Nutrition:  Health Habits/Nutrition  Do you exercise for at least 20 minutes 2-3 times per week?: (!) No  Have you lost any weight without trying in the past 3 months?: No  Do you eat fewer than 2 meals per day?: (!) Yes  Have you seen a dentist within the past year?: Yes  Body mass index: (!) 39.92  Health Habits/Nutrition Interventions:  · advised on proper nutrition and activity    Safety:  Safety  Do you have working smoke detectors?: Yes  Have all throw rugs been removed or fastened?: (!) No  Do you have non-slip mats or surfaces in all bathtubs/showers?: Yes  Do all of your stairways have a railing or banister?: Yes  Are your doorways, halls and stairs free of clutter?: Yes  Do you always fasten your seatbelt when you are in a car?: Yes  Safety Interventions:  · Home safety tips provided    Personalized Preventive Plan   Current Health Maintenance Status  Immunization History   Administered Date(s) Administered    Influenza, High Dose (Fluzone 65 yrs and older) 10/04/2017    Influenza, Quadv, IM, (6 mo and older Fluzone, Flulaval, Fluarix and 3 yrs and older Afluria) 10/04/2016    Influenza, Quadv, IM, PF (6 mo and older Fluzone, Flulaval, Fluarix, and 3 yrs and older Afluria) 10/15/2018    Influenza, Quadv, adjuvanted, 65 yrs +, IM, PF (Fluad) 10/20/2020    Influenza, Triv, inactivated, subunit, adjuvanted, IM (Fluad 65 yrs and older) 10/16/2019    Pneumococcal Conjugate 13-valent ([id number]) 10/04/2017    Pneumococcal Polysaccharide ([id number]) 10/15/2018    Td, unspecified formulation 06/07/2015    Zoster Recombinant (Shingrix) 08/16/2018, 02/12/2019        Health Maintenance   Topic Date Due    Annual Wellness Visit (AWV)  05/29/2019    Lipid screen  10/20/2020 (Originally 10/22/1991)    DTaP/Tdap/Td vaccine (1 - Tdap) 10/20/2021 (Originally 10/22/1970)    Hepatitis C screen  10/20/2021 (Originally 1950)    Breast cancer screen  10/25/2021    Colon cancer screen colonoscopy  12/21/2021    DEXA (modify frequency per FRAX score)  Completed    Flu vaccine  Completed    Shingles Vaccine  Completed    Pneumococcal 65+ years Vaccine  Completed    Hepatitis A vaccine  Aged Out    Hepatitis B vaccine  Aged Out    Hib vaccine  Aged Out    Meningococcal (ACWY) vaccine  Aged Out     Recommendations for Neurolink Due: see orders and patient instructions/AVS.  . Recommended screening schedule for the next 5-10 years is provided to the patient in written form: see Patient Instructions/AVS.    Brooksie Boxer was seen today for annual exam and leg swelling. Diagnoses and all orders for this visit:    Routine general medical examination at a health care facility    Needs flu shot  -     INFLUENZA, QUADV, ADJUVANTED, 72 YRS =, IM, PF, PREFILL SYR, 0.5ML (FLUAD)    Morbid obesity with BMI of 40.0-44.9, adult (HCC)    Primary osteoarthritis involving multiple joints  -     meloxicam (MOBIC) 15 MG tablet;  Take 1 tablet by mouth daily    Mass of skin  -     External Referral To Dermatology

## 2020-10-20 NOTE — PROGRESS NOTES
SURGERY Right     ROTATOR CUFF REPAIR Right 2009    right    SINUS SURGERY         Social History     Tobacco Use    Smoking status: Former Smoker     Packs/day: 0.25     Years: 30.00     Pack years: 7.50     Last attempt to quit: 2018     Years since quittin.0    Smokeless tobacco: Never Used   Substance Use Topics    Alcohol use: No    Drug use: No       Family History   Problem Relation Age of Onset    Cancer Mother          I have reviewed the patient's past medical history, past surgical history, allergies, medications, social and family history and I have made updates where appropriate. Review of Systems        PHYSICAL EXAM:  /84   Pulse 88   Temp 97.8 °F (36.6 °C)   Resp 18   Ht 5' 3\" (1.6 m)   Wt 225 lb 6.4 oz (102.2 kg)   SpO2 98%   BMI 39.93 kg/m²     General Appearance: well developed and well- nourished, in no acute distress  Head: normocephalic and atraumatic  ENT: external ear and ear canal normal bilaterally, nose without deformity, nasal mucosa and turbinates normal without polyps, oropharynx normal, dentition is normal for age, no lipor gum lesions noted  Neck: supple and non-tender without mass, no thyromegaly or thyroid nodules, no cervical lymphadenopathy  Pulmonary/Chest: clear to auscultation bilaterally- no wheezes, rales or rhonchi, normal air movement, no respiratory distress or retractions  Cardiovascular: normal rate, regular rhythm, normal S1 and S2, no murmurs, rubs, clicks, or gallops, distal pulses intact  Extremities: no cyanosis, clubbing or edema of the lower extremities  Psych:  Normal affect without evidence of depression oranxiety, insight and judgement are appropriate, memory appears intact  Skin: warm and dry, 2 separate blue/red skin nodules noted on the left lateral LE, one does appear to have central umbilication      ASSESSMENT & PLAN  Demi Patel was seen today for annual exam and leg swelling.     Diagnoses and all orders for this visit:    Routine general medical examination at a health care facility    Needs flu shot  -     INFLUENZA, QUADV, ADJUVANTED, 72 YRS =, IM, PF, PREFILL SYR, 0.5ML (FLUAD)    Morbid obesity with BMI of 40.0-44.9, adult (HCC)    Primary osteoarthritis involving multiple joints  -     meloxicam (MOBIC) 15 MG tablet; Take 1 tablet by mouth daily    Mass of skin  -     External Referral To Dermatology    -skin mass possible nodular BCC, will refer to dermatology for further evaluation  -will trial mobic for joint pain, she wants to think about an ortho referral    Return for Medicare Annual Wellness Visit in 1 year. Controlled Substance Monitoring:    Acute and Chronic Pain Monitoring:   No flowsheet data found. Agueda received counseling on the following healthy behaviors: medication adherence  Reviewed prior labs and health maintenance. Continue current medications, diet and exercise. Discussed use, benefit, and side effects of prescribed medications. Barriers to medication compliance addressed. Patient given educational materials - see patient instructions. All patient questions answered. Patient voiced understanding.

## 2020-10-26 ENCOUNTER — HOSPITAL ENCOUNTER (OUTPATIENT)
Dept: MAMMOGRAPHY | Age: 70
Discharge: HOME | End: 2020-10-26
Payer: MEDICARE

## 2020-10-26 PROCEDURE — 77063 BREAST TOMOSYNTHESIS BI: CPT

## 2020-12-08 ENCOUNTER — HOSPITAL ENCOUNTER (OUTPATIENT)
Age: 70
Discharge: HOME | End: 2020-12-08
Payer: MEDICARE

## 2020-12-08 ENCOUNTER — HOSPITAL ENCOUNTER (OUTPATIENT)
Dept: GENERAL RADIOLOGY | Age: 70
Discharge: HOME | End: 2020-12-08
Payer: MEDICARE

## 2020-12-08 LAB
EKG ATRIAL RATE: 85 BPM
EKG P AXIS: 51 DEGREES
EKG P-R INTERVAL: 152 MS
EKG Q-T INTERVAL: 380 MS
EKG QRS DURATION: 90 MS
EKG QTC CALCULATION (BAZETT): 452 MS
EKG R AXIS: -17 DEGREES
EKG T AXIS: 36 DEGREES
EKG VENTRICULAR RATE: 85 BPM
ERYTHROCYTE [DISTWIDTH] IN BLOOD BY AUTOMATED COUNT: 13.5 % (ref 11.5–14.5)
ERYTHROCYTE [DISTWIDTH] IN BLOOD BY AUTOMATED COUNT: 47.7 FL (ref 35–45)
HCT VFR BLD CALC: 46.2 % (ref 37–47)
HEMOGLOBIN: 14.8 GM/DL (ref 12–16)
MCH RBC QN AUTO: 30.5 PG (ref 26–33)
MCHC RBC AUTO-ENTMCNC: 32 GM/DL (ref 32.2–35.5)
MCV RBC AUTO: 95.1 FL (ref 81–99)
PLATELET # BLD: 219 THOU/MM3 (ref 130–400)
PMV BLD AUTO: 11.8 FL (ref 9.4–12.4)
RBC # BLD: 4.86 MILL/MM3 (ref 4.2–5.4)
WBC # BLD: 8.3 THOU/MM3 (ref 4.8–10.8)

## 2020-12-08 PROCEDURE — 93010 ELECTROCARDIOGRAM REPORT: CPT | Performed by: NUCLEAR MEDICINE

## 2020-12-08 PROCEDURE — 80048 BASIC METABOLIC PNL TOTAL CA: CPT

## 2020-12-08 PROCEDURE — 71046 X-RAY EXAM CHEST 2 VIEWS: CPT

## 2020-12-08 PROCEDURE — 99211 OFF/OP EST MAY X REQ PHY/QHP: CPT

## 2020-12-08 PROCEDURE — 85027 COMPLETE CBC AUTOMATED: CPT

## 2020-12-08 PROCEDURE — 93005 ELECTROCARDIOGRAM TRACING: CPT | Performed by: SPECIALIST

## 2020-12-08 PROCEDURE — U0003 INFECTIOUS AGENT DETECTION BY NUCLEIC ACID (DNA OR RNA); SEVERE ACUTE RESPIRATORY SYNDROME CORONAVIRUS 2 (SARS-COV-2) (CORONAVIRUS DISEASE [COVID-19]), AMPLIFIED PROBE TECHNIQUE, MAKING USE OF HIGH THROUGHPUT TECHNOLOGIES AS DESCRIBED BY CMS-2020-01-R: HCPCS

## 2020-12-08 PROCEDURE — 36415 COLL VENOUS BLD VENIPUNCTURE: CPT

## 2020-12-09 LAB
ANION GAP SERPL CALCULATED.3IONS-SCNC: 14 MEQ/L (ref 8–16)
BUN BLDV-MCNC: 14 MG/DL (ref 7–22)
CALCIUM SERPL-MCNC: 9.6 MG/DL (ref 8.5–10.5)
CHLORIDE BLD-SCNC: 101 MEQ/L (ref 98–111)
CO2: 27 MEQ/L (ref 23–33)
CREAT SERPL-MCNC: 0.9 MG/DL (ref 0.4–1.2)
GFR SERPL CREATININE-BSD FRML MDRD: 62 ML/MIN/1.73M2
GLUCOSE BLD-MCNC: 154 MG/DL (ref 70–108)
POTASSIUM SERPL-SCNC: 4 MEQ/L (ref 3.5–5.2)
SODIUM BLD-SCNC: 142 MEQ/L (ref 135–145)

## 2020-12-09 RX ORDER — ACETAMINOPHEN 325 MG/1
650 TABLET ORAL EVERY 6 HOURS PRN
COMMUNITY

## 2020-12-09 NOTE — PROGRESS NOTES
In preparation for their surgical procedure above patient was screened for Obstructive Sleep Apnea (JESSIE) using the STOP-Bang Questionnaire by the Pre-Admission Testing department. This is a pre-surgical screening tool for patient safety and serves as a recommendation, this WILL NOT cause cancellation of surgery. STOP-Bang Questionnaire  * Do you currently see a pulmonologist?  No         1. Do you snore loudly (able to be heard in the next room)? No    2. Do you often feel tired or sleepy during the daytime? No       3. Has anyone ever told you that you stop breathing during your sleep? No    4. Do you have or are you being treated for high blood pressure? No      5. BMI more than 35? BMI (Calculated): 40.3        Yes    6. Age over 48 years? 72 y.o. Yes    7. Neck Circumference greater than 17 inches for male or 16 inches for female? Measured           (visits only)            Not Applicable    8. Gender Male? No      TOTAL SCORE: 2    JESSIE - Low Risk : Yes to 0 - 2 questions  JESSIE - Intermediate Risk : Yes to 3 - 4 questions  JESSIE - High Risk : Yes to 5 - 8 questions    Adapted from:   STOP Questionnaire: A Tool to Screen Patients for Obstructive Sleep Apnea   ALISA NavaC.P.C., Cornelio Andrew M.B.B.S., Yolanda Jain M.D., Yu Francois. Torri Shea, Ph.D., WALESKA Newell.B.B.S., Lb Jones M.Sc., Juan Miguel Aguillon M.D., Tea Montes. ALISA DillonC.P.C.    Anesthesiology 2008; 111:340-88 Copyright 2008, the [address] of Anesthesiologists, [facility].   ----------------------------------------------------------------------------------------------------------------

## 2020-12-09 NOTE — PROGRESS NOTES
NPO after midnight except sip of water with heart/BP meds  Follow all instructions given by surgeon including medications to hold  Bring insurance card and photo ID  Shower the night before or morning of procedure with liquid antibacterial soap  Wear comfortable clothing  Do not bring jewelry or valuables  Bring list of medications with dosage and how often taken if not reviewed   needed at discharge at least 25years old  Call PAT at [phone] for questions    Instructed to call surgery center at [phone] upon arrival to speak with  before entering building. Covid screen due  at [facility] 6 to 7 days before procedure.  Pt completed on 12/8

## 2020-12-10 LAB — SARS-COV-2: NOT DETECTED

## 2020-12-16 ENCOUNTER — HOSPITAL ENCOUNTER (OUTPATIENT)
Age: 70
Setting detail: OUTPATIENT SURGERY
Discharge: HOME | End: 2020-12-16
Attending: SPECIALIST | Admitting: SPECIALIST
Payer: MEDICARE

## 2020-12-16 ENCOUNTER — ANESTHESIA (OUTPATIENT)
Dept: OPERATING ROOM | Age: 70
End: 2020-12-16
Payer: MEDICARE

## 2020-12-16 ENCOUNTER — ANESTHESIA EVENT (OUTPATIENT)
Dept: OPERATING ROOM | Age: 70
End: 2020-12-16
Payer: MEDICARE

## 2020-12-16 VITALS
HEART RATE: 80 BPM | TEMPERATURE: 97.1 F | BODY MASS INDEX: 40.85 KG/M2 | OXYGEN SATURATION: 95 % | RESPIRATION RATE: 16 BRPM | HEIGHT: 62 IN | DIASTOLIC BLOOD PRESSURE: 92 MMHG | WEIGHT: 222 LBS | SYSTOLIC BLOOD PRESSURE: 178 MMHG

## 2020-12-16 VITALS
SYSTOLIC BLOOD PRESSURE: 116 MMHG | DIASTOLIC BLOOD PRESSURE: 56 MMHG | RESPIRATION RATE: 19 BRPM | OXYGEN SATURATION: 95 %

## 2020-12-16 PROCEDURE — 7100000010 HC PHASE II RECOVERY - FIRST 15 MIN: Performed by: SPECIALIST

## 2020-12-16 PROCEDURE — L4361 PNEUMA/VAC WALK BOOT PRE OTS: HCPCS | Performed by: SPECIALIST

## 2020-12-16 PROCEDURE — 3600000002 HC SURGERY LEVEL 2 BASE: Performed by: SPECIALIST

## 2020-12-16 PROCEDURE — 2580000003 HC RX 258: Performed by: NURSE ANESTHETIST, CERTIFIED REGISTERED

## 2020-12-16 PROCEDURE — 7100000011 HC PHASE II RECOVERY - ADDTL 15 MIN: Performed by: SPECIALIST

## 2020-12-16 PROCEDURE — 88305 TISSUE EXAM BY PATHOLOGIST: CPT

## 2020-12-16 PROCEDURE — 3600000012 HC SURGERY LEVEL 2 ADDTL 15MIN: Performed by: SPECIALIST

## 2020-12-16 PROCEDURE — 2500000003 HC RX 250 WO HCPCS: Performed by: NURSE ANESTHETIST, CERTIFIED REGISTERED

## 2020-12-16 PROCEDURE — 3700000001 HC ADD 15 MINUTES (ANESTHESIA): Performed by: SPECIALIST

## 2020-12-16 PROCEDURE — 88304 TISSUE EXAM BY PATHOLOGIST: CPT

## 2020-12-16 PROCEDURE — 88331 PATH CONSLTJ SURG 1 BLK 1SPC: CPT

## 2020-12-16 PROCEDURE — 6360000002 HC RX W HCPCS: Performed by: NURSE ANESTHETIST, CERTIFIED REGISTERED

## 2020-12-16 PROCEDURE — 2709999900 HC NON-CHARGEABLE SUPPLY: Performed by: SPECIALIST

## 2020-12-16 PROCEDURE — 2500000003 HC RX 250 WO HCPCS: Performed by: SPECIALIST

## 2020-12-16 PROCEDURE — 3700000000 HC ANESTHESIA ATTENDED CARE: Performed by: SPECIALIST

## 2020-12-16 RX ORDER — LIDOCAINE HYDROCHLORIDE 20 MG/ML
INJECTION, SOLUTION EPIDURAL; INFILTRATION; INTRACAUDAL; PERINEURAL PRN
Status: DISCONTINUED | OUTPATIENT
Start: 2020-12-16 | End: 2020-12-16 | Stop reason: SDUPTHER

## 2020-12-16 RX ORDER — PROPOFOL 10 MG/ML
INJECTION, EMULSION INTRAVENOUS PRN
Status: DISCONTINUED | OUTPATIENT
Start: 2020-12-16 | End: 2020-12-16 | Stop reason: SDUPTHER

## 2020-12-16 RX ORDER — SODIUM CHLORIDE 9 MG/ML
INJECTION, SOLUTION INTRAVENOUS CONTINUOUS
Status: DISCONTINUED | OUTPATIENT
Start: 2020-12-16 | End: 2020-12-16 | Stop reason: HOSPADM

## 2020-12-16 RX ORDER — PROPOFOL 10 MG/ML
INJECTION, EMULSION INTRAVENOUS CONTINUOUS PRN
Status: DISCONTINUED | OUTPATIENT
Start: 2020-12-16 | End: 2020-12-16 | Stop reason: SDUPTHER

## 2020-12-16 RX ORDER — LIDOCAINE HYDROCHLORIDE AND EPINEPHRINE 10; 10 MG/ML; UG/ML
INJECTION, SOLUTION INFILTRATION; PERINEURAL PRN
Status: DISCONTINUED | OUTPATIENT
Start: 2020-12-16 | End: 2020-12-16 | Stop reason: ALTCHOICE

## 2020-12-16 RX ORDER — ONDANSETRON 2 MG/ML
INJECTION INTRAMUSCULAR; INTRAVENOUS PRN
Status: DISCONTINUED | OUTPATIENT
Start: 2020-12-16 | End: 2020-12-16 | Stop reason: SDUPTHER

## 2020-12-16 RX ORDER — FENTANYL CITRATE 50 UG/ML
INJECTION, SOLUTION INTRAMUSCULAR; INTRAVENOUS PRN
Status: DISCONTINUED | OUTPATIENT
Start: 2020-12-16 | End: 2020-12-16 | Stop reason: SDUPTHER

## 2020-12-16 RX ORDER — SODIUM CHLORIDE 9 MG/ML
INJECTION, SOLUTION INTRAVENOUS CONTINUOUS PRN
Status: DISCONTINUED | OUTPATIENT
Start: 2020-12-16 | End: 2020-12-16 | Stop reason: SDUPTHER

## 2020-12-16 RX ORDER — CEFAZOLIN SODIUM 1 G/3ML
INJECTION, POWDER, FOR SOLUTION INTRAMUSCULAR; INTRAVENOUS PRN
Status: DISCONTINUED | OUTPATIENT
Start: 2020-12-16 | End: 2020-12-16 | Stop reason: SDUPTHER

## 2020-12-16 RX ADMIN — PROPOFOL 50 MCG/KG/MIN: 10 INJECTION, EMULSION INTRAVENOUS at 12:25

## 2020-12-16 RX ADMIN — LIDOCAINE HYDROCHLORIDE 50 MG: 20 INJECTION, SOLUTION EPIDURAL; INFILTRATION; INTRACAUDAL; PERINEURAL at 12:25

## 2020-12-16 RX ADMIN — ONDANSETRON HYDROCHLORIDE 4 MG: 4 INJECTION, SOLUTION INTRAMUSCULAR; INTRAVENOUS at 12:43

## 2020-12-16 RX ADMIN — SODIUM CHLORIDE: 9 INJECTION, SOLUTION INTRAVENOUS at 12:19

## 2020-12-16 RX ADMIN — PROPOFOL 30 MG: 10 INJECTION, EMULSION INTRAVENOUS at 12:25

## 2020-12-16 RX ADMIN — FENTANYL CITRATE 50 MCG: 50 INJECTION, SOLUTION INTRAMUSCULAR; INTRAVENOUS at 12:25

## 2020-12-16 RX ADMIN — CEFAZOLIN 2000 MG: 1 INJECTION, POWDER, FOR SOLUTION INTRAMUSCULAR; INTRAVENOUS; PARENTERAL at 12:26

## 2020-12-16 ASSESSMENT — PAIN SCALES - GENERAL: PAINLEVEL_OUTOF10: 0

## 2020-12-16 NOTE — ANESTHESIA POSTPROCEDURE EVALUATION
Department of Anesthesiology  Postprocedure Note    Patient: Zuleyka Yadav  MRN: [id number]  YOB: 1950  Date of evaluation: 12/16/2020  Time:  2:28 PM     Procedure Summary     Date: 12/16/20 Room / Location: [address] [place] [address] 04 / [address] [facility]    Anesthesia Start: 1219 Anesthesia Stop: 1319    Procedure: EXCISION SCC LEFT LOWER LEG \"C\" AND LIPOMA LEFT ANKLE WITH FROZEN SECTION (Left ) Diagnosis: (SCC LEFT LOWER LEG \"C\" AND LIPOMA LEFT ANKLE)    Surgeons: Juan Vo MD Responsible Provider: Scot Colin MD    Anesthesia Type: MAC ASA Status: 2          Anesthesia Type: MAC    Veronique Phase I:      Veronique Phase II: Veronique Score: 10    Last vitals: Reviewed and per EMR flowsheets.        Anesthesia Post Evaluation

## 2020-12-16 NOTE — ANESTHESIA PRE PROCEDURE
Department of Anesthesiology  Preprocedure Note       Name:  Brad Ring   Age:  72 y.o.  :  1950                                          MRN:  [id number]         Date:  2020      Surgeon: Mitul Hernandez):  Pati Torres MD    Procedure: Procedure(s):  EXCISION SCC LEFT LOWER LEG \"C\" AND LIPOMA LEFT ANKLE WITH FROZEN SECTION    Medications prior to admission:   Prior to Admission medications    Medication Sig Start Date End Date Taking? Authorizing Provider   acetaminophen (TYLENOL) 325 MG tablet Take 650 mg by mouth every 6 hours as needed for Pain   Yes Historical Provider, MD   citalopram (CELEXA) 20 MG tablet TAKE 1 TABLET BY MOUTH ONCE DAILY 20  Yes Deb Heath DO   vitamin B-12 (CYANOCOBALAMIN) 500 MCG tablet Take 500 mcg by mouth daily    Historical Provider, MD   folic acid (FOLVITE) 1 MG tablet Take 1 mg by mouth daily. Historical Provider, MD   melatonin 3 MG TABS tablet Take 3 mg by mouth daily. Historical Provider, MD   Calcium Carb-Cholecalciferol (CALCIUM + D3) 600-200 MG-UNIT TABS Take 2 tablets by mouth daily.     Historical Provider, MD       Current medications:    Current Facility-Administered Medications   Medication Dose Route Frequency Provider Last Rate Last Admin    ceFAZolin (ANCEF) 2 g in dextrose 5 % 50 mL IVPB  2 g Intravenous Once Pati Torres MD        0.9 % sodium chloride infusion   Intravenous Continuous Pati Torres MD           Allergies:  No Known Allergies    Problem List:    Patient Active Problem List   Diagnosis Code    Anxiety F41.9    Primary osteoarthritis of right knee M17.11    History of DVT (deep vein thrombosis) Z86.718    Morbid obesity with BMI of 40.0-44.9, [facility] ([facility] E66.01, Z68.41       Past Medical History:        Diagnosis Date    [facility] ([facility]     skin cancer    Grief reaction     Hx of blood clots     right lower leg       Past Surgical History:        Procedure Laterality Date    CARPAL TUNNEL RELEASE Left left    FOOT SURGERY Right     FOOT SURGERY Left     HYSTERECTOMY  1974    AUB    JOINT REPLACEMENT Left     hip    JOINT REPLACEMENT Right     knee    KNEE SURGERY Right     ROTATOR CUFF REPAIR Right 2009    right    SINUS SURGERY         Social History:    Social History     Tobacco Use    Smoking status: Former Smoker     Packs/day: 0.50     Years: 30.00     Pack years: 15.00    Smokeless tobacco: Never Used   Substance Use Topics    Alcohol use: No                                Counseling given: Not Answered      Vital Signs (Current):   Vitals:    12/09/20 1147 12/16/20 1033   BP:  (!) 178/92   Pulse:  80   Resp:  16   Temp:  97.1 °F (36.2 °C)   TempSrc:  Temporal   SpO2:  95%   Weight: 220 lb (99.8 kg) 222 lb (100.7 kg)   Height: 5' 2\" (1.575 m) 5' 2\" (1.575 m)                                              BP Readings from Last 3 Encounters:   12/16/20 (!) 178/92   10/20/20 132/84   10/16/19 118/77       NPO Status: Time of last liquid consumption: 2030                        Time of last solid consumption: 1800                        Date of last liquid consumption: 12/15/20                        Date of last solid food consumption: 12/15/20    BMI:   Wt Readings from Last 3 Encounters:   12/16/20 222 lb (100.7 kg)   10/20/20 225 lb 6.4 oz (102.2 kg)   10/16/19 230 lb (104.3 kg)     Body mass index is 40.6 kg/m².     CBC:   Lab Results   Component Value Date    WBC 8.3 12/08/2020    RBC 4.86 12/08/2020    HGB 14.8 12/08/2020    HCT 46.2 12/08/2020    MCV 95.1 12/08/2020    RDW 13.8 05/13/2015     12/08/2020       CMP:   Lab Results   Component Value Date     12/08/2020    K 4.0 12/08/2020     12/08/2020    CO2 27 12/08/2020    BUN 14 12/08/2020    CREATININE 0.9 12/08/2020    LABGLOM 62 12/08/2020    GLUCOSE 154 12/08/2020    PROT 6.5 01/16/2019    CALCIUM 9.6 12/08/2020    BILITOT 0.4 01/16/2019    ALKPHOS 92 01/16/2019    AST 13 01/16/2019    ALT 11 01/16/2019 POC Tests: No results for input(s): POCGLU, POCNA, POCK, POCCL, POCBUN, POCHEMO, POCHCT in the last 72 hours. Coags: No results found for: PROTIME, INR, APTT    HCG (If Applicable): No results found for: PREGTESTUR, PREGSERUM, HCG, HCGQUANT     ABGs: No results found for: PHART, PO2ART, NAX9YDY, PPF9QKH, BEART, R1OSTDUZ     Type & Screen (If Applicable):  Lab Results   Component Value Date    LABRH POS 01/05/2017       Drug/Infectious Status (If Applicable):  No results found for: HIV, HEPCAB    COVID-19 Screening (If Applicable):   Lab Results   Component Value Date    COVID19 Not Detected 12/08/2020         Anesthesia Evaluation    Airway: Mallampati: II       Mouth opening: > = 3 FB Dental:          Pulmonary:       (-) COPD                           Cardiovascular:                      Neuro/Psych:               GI/Hepatic/Renal:             Endo/Other:                     Abdominal:   (+) obese,         Vascular:                                        Anesthesia Plan      MAC     ASA 2             Anesthetic plan and risks discussed with patient.                       Joce Veliz MD   12/16/2020

## 2020-12-16 NOTE — PROGRESS NOTES
1320 To recovery via cart. Spont resp. VSS. IV infusing KVO. Left lower leg ace wrapped and  in surgical boot. Incision to left groin clean dry and intact with skin glue. Pt denies nausea or pain. Report received from surgical rn. Warm blankets applied. Snack and drink given. Call bell in reach  1335 Remains comfortable  1340 IV discontinued  1345 Up to dress self.   at side  1400 Discharge instructions given to pt and  with each voicing understanding  [phone] Discharge to home per wheel chair in stable ambulatory condition with

## 2020-12-16 NOTE — OP NOTE
Operative Note    Patient name: Kiko Smyth             Medical Record Number: [id number]    Primary Care Physician: Roda Olszewski, DO DOB 1950    Date of Procedure: 2020    Pre-operative Diagnosis:  1.  1.2cm left lower leg squamous cell carcinoma       2.  5cm painful left ankle subcutaneous fatty tumor    Post-operative Diagnosis: Same    Procedure Performed:  1.  2cm excsion of left lower leg squamous cell carcinoma creating a 3cm2 defect that was repaired with a full thickness skin graft (3 cm2) (CPT 53696 & 02442)       2.   Excision of painful left ankle 5cm subcutaneous fatty tumor (CPT 53641)    Surgeons/Assistants: Dr. Kirk Mcadams PA-C    Estimated Blood Loss: 5ml     Complications: none immediately appreciated    Procedure: With the patient lying in the supine position and under adequate anesthesia per the anesthesia team.   Local anesthesia consisting of 29 ml of 1% Lidocaine 1:100,000 with epinephrine solution of the donor site of the left inguinal area, as well as the squamous cell carcinoma and ankle tumor. The carcinoma was excised and sent for fresh frozen evaluation after the area was prepped and draped in the standard surgical fashion. Pathology called back to the room and stated the margin were free. The patient has very thin skin and a large defect which could not be closed primarily, therefore a full thickness skin graft was taken. It was defatted and secured in position with a 3-0 silk suture tie and then a 5-0 fast absorbable suture was placed in a simple running fashion. A Bacitracin Xeroform and moist cotton ball tie over bolster was secured using the tails of the silk sutures. The donor site was closed with a 3-0 Monocryl suture placed in interrupted buried fashion and then Histoacryl respectively. An incision was made on the superior portion of the ankle tumor and then removed using sharp and blunt dissection. This was closed with 3-0 Nylon sutures. Bacitracin and bulky sterile dressings were applied as final covering. The patient tolerated the procedure well and remained hemodynamically stable throughout the procedure and was quite comfortable throughout the operative course.     Clinical staging for cancer cases:  Ct  Cn  Cm    Issac Garzon  Electronically signed by me on 12/16/2020 at 1:13 PM  Operative Note      Patient: Sean Tapia  YOB: 1950  MRN: [id number]    Date of Procedure: 12/16/2020    Pre-Op Diagnosis: SCC LEFT LOWER LEG \"C\" AND LIPOMA LEFT ANKLE    Post-Op Diagnosis: Same       Procedure(s):  EXCISION SCC LEFT LOWER LEG \"C\" AND LIPOMA LEFT ANKLE WITH FROZEN SECTION    Surgeon(s):  Issac Garzon MD    Assistant:   Physician Assistant: Titus Ni PA-C Anesthesia: Monitor Anesthesia Care    Estimated Blood Loss (mL): Minimal    Complications: None    Specimens:   ID Type Source Tests Collected by Time Destination   A :  Tissue Leg SURGICAL PATHOLOGY Tonio Duggan MD 12/16/2020 1148    B :  Tissue Ankle SURGICAL PATHOLOGY Tonio Duggan MD 12/16/2020 1150        Implants:  * No implants in log *      Drains: * No LDAs found *    Findings: 1.  1.2cm left lower leg squamous cell carcinoma       2.  5cm painful left ankle subcutaneous fatty tumor      Detailed Description of Procedure:   1.  2cm excsion of left lower leg squamous cell carcinoma creating a 3cm2 defect that was repaired with a full thickness skin graft (3 cm2) (CPT 94197 & 91075)       2.   Excision of painful left ankle 5cm subcutaneous fatty tumor (CPT 97320)    Electronically signed by Tonio Duggan MD on 12/16/2020 at 1:12 PM

## 2021-02-19 ENCOUNTER — OFFICE VISIT (OUTPATIENT)
Dept: FAMILY MEDICINE CLINIC | Age: 71
End: 2021-02-19
Payer: MEDICARE

## 2021-02-19 VITALS
RESPIRATION RATE: 18 BRPM | SYSTOLIC BLOOD PRESSURE: 120 MMHG | TEMPERATURE: 98.7 F | OXYGEN SATURATION: 98 % | WEIGHT: 224.8 LBS | HEART RATE: 88 BPM | HEIGHT: 62 IN | BODY MASS INDEX: 41.37 KG/M2 | DIASTOLIC BLOOD PRESSURE: 62 MMHG

## 2021-02-19 DIAGNOSIS — K62.89 RECTAL PAIN: Primary | ICD-10-CM

## 2021-02-19 DIAGNOSIS — K64.9 HEMORRHOIDS, UNSPECIFIED HEMORRHOID TYPE: ICD-10-CM

## 2021-02-19 PROCEDURE — 1090F PRES/ABSN URINE INCON ASSESS: CPT | Performed by: NURSE PRACTITIONER

## 2021-02-19 PROCEDURE — G8484 FLU IMMUNIZE NO ADMIN: HCPCS | Performed by: NURSE PRACTITIONER

## 2021-02-19 PROCEDURE — G8400 PT W/DXA NO RESULTS DOC: HCPCS | Performed by: NURSE PRACTITIONER

## 2021-02-19 PROCEDURE — 99214 OFFICE O/P EST MOD 30 MIN: CPT | Performed by: NURSE PRACTITIONER

## 2021-02-19 PROCEDURE — G8417 CALC BMI ABV UP PARAM F/U: HCPCS | Performed by: NURSE PRACTITIONER

## 2021-02-19 PROCEDURE — 1123F ACP DISCUSS/DSCN MKR DOCD: CPT | Performed by: NURSE PRACTITIONER

## 2021-02-19 PROCEDURE — 3017F COLORECTAL CA SCREEN DOC REV: CPT | Performed by: NURSE PRACTITIONER

## 2021-02-19 PROCEDURE — 4004F PT TOBACCO SCREEN RCVD TLK: CPT | Performed by: NURSE PRACTITIONER

## 2021-02-19 PROCEDURE — G8427 DOCREV CUR MEDS BY ELIG CLIN: HCPCS | Performed by: NURSE PRACTITIONER

## 2021-02-19 PROCEDURE — 4040F PNEUMOC VAC/ADMIN/RCVD: CPT | Performed by: NURSE PRACTITIONER

## 2021-02-19 SDOH — ECONOMIC STABILITY: FOOD INSECURITY: WITHIN THE PAST 12 MONTHS, THE FOOD YOU BOUGHT JUST DIDN'T LAST AND YOU DIDN'T HAVE MONEY TO GET MORE.: NEVER TRUE

## 2021-02-19 SDOH — ECONOMIC STABILITY: INCOME INSECURITY: HOW HARD IS IT FOR YOU TO PAY FOR THE VERY BASICS LIKE FOOD, HOUSING, MEDICAL CARE, AND HEATING?: NOT HARD AT ALL

## 2021-02-19 ASSESSMENT — PATIENT HEALTH QUESTIONNAIRE - PHQ9
SUM OF ALL RESPONSES TO PHQ9 QUESTIONS 1 & 2: 0
SUM OF ALL RESPONSES TO PHQ QUESTIONS 1-9: 0
2. FEELING DOWN, DEPRESSED OR HOPELESS: 0

## 2021-02-19 NOTE — PATIENT INSTRUCTIONS
Patient Education        Hemorrhoids: Care Instructions  Your Care Instructions     Hemorrhoids are enlarged veins that develop in the anal canal. Bleeding during bowel movements, itching, swelling, and rectal pain are the most common symptoms. They can be uncomfortable at times, but hemorrhoids rarely are a serious problem. You can treat most hemorrhoids with simple changes to your diet and bowel habits. These changes include eating more fiber and not straining to pass stools. Most hemorrhoids do not need surgery or other treatment unless they are very large and painful or bleed a lot. Follow-up care is a key part of your treatment and safety. Be sure to make and go to all appointments, and call your doctor if you are having problems. It's also a good idea to know your test results and keep a list of the medicines you take. How can you care for yourself at home? · Sit in a few inches of warm water (sitz bath) 3 times a day and after bowel movements. The warm water helps with pain and itching. · Put ice on your anal area several times a day for 10 minutes at a time. Put a thin cloth between the ice and your skin. Follow this by placing a warm, wet towel on the area for another 10 to 20 minutes. · Take pain medicines exactly as directed. ? If the doctor gave you a prescription medicine for pain, take it as prescribed. ? If you are not taking a prescription pain medicine, ask your doctor if you can take an over-the-counter medicine. · Keep the anal area clean, but be gentle. Use water and a fragrance-free soap, such as [place], or use baby wipes or medicated pads, such as Tucks. · Wear cotton underwear and loose clothing to decrease moisture in the anal area. · Eat more fiber. Include foods such as whole-grain breads and cereals, raw vegetables, raw and dried fruits, and beans. · Drink plenty of fluids, enough so that your urine is light yellow or clear like water. If you have kidney, heart, or liver disease and have to limit fluids, talk with your doctor before you increase the amount of fluids you drink. · Use a stool softener that contains bran or psyllium. You can save money by buying bran or psyllium (available in bulk at most health food stores) and sprinkling it on foods or stirring it into fruit juice. Or you can use a product such as Metamucil or Hydrocil. · Practice healthy bowel habits. ? Go to the bathroom as soon as you have the urge. ? Avoid straining to pass stools. Relax and give yourself time to let things happen naturally. ? Do not hold your breath while passing stools. ? Do not read while sitting on the toilet. Get off the toilet as soon as you have finished. · Take your medicines exactly as prescribed. Call your doctor if you think you are having a problem with your medicine. When should you call for help? Call 911 anytime you think you may need emergency care. For example, call if:    · You pass maroon or very bloody stools. Call your doctor now or seek immediate medical care if:    · You have increased pain.     · You have increased bleeding. Watch closely for changes in your health, and be sure to contact your doctor if:    · Your symptoms have not improved after 3 or 4 days. Where can you learn more? Go to https://Anti-Microbial [employer]karan.[employer]. org and sign in to your [employer] account. Enter I017 in the [employer] box to learn more about \"Hemorrhoids: Care Instructions. \"     If you do not have an account, please click on the \"Sign Up Now\" link. Current as of: April 15, 2020               Content Version: 12.6  © 4810-0782 [employer]. Care instructions adapted under license by [facility] ([facility]). If you have questions about a medical condition or this instruction, always ask your healthcare professional. [address] any warranty or liability for your use of this information. Patient Education        Learning About Rubber Band Ligation for Hemorrhoids  What is rubber band ligation? Rubber band ligation treats hemorrhoids. Hemorrhoids are swollen veins in the rectal area. In most cases, this procedure can be done in the doctor's office. Your doctor can treat one or two hemorrhoids at a time. This treatment is only for internal hemorrhoids. How is this procedure done? Your doctor will insert a viewing scope (anoscope) into your anus. The hemorrhoid is grasped with a small tool. Then a device places a rubber band around the base of the hemorrhoid. This stops blood flow to the hemorrhoids. The hemorrhoids shrink and fall off 7 to 10 days after the procedure. You will be awake during the procedure. You may feel some discomfort. You will be asked if the rubber bands feel too tight. If the bands are too painful, a medicine may be injected into the banded hemorrhoids to numb them. You will be able to go home right after the procedure. What can you expect after the procedure? After the procedure, you may feel pain and have a feeling of fullness in your lower belly. Or you may feel as if you need to have a bowel movement. This usually goes away after several days. You may need pain medicine during this time. You may have a small amount of bleeding from your anus about 7 to 10 days after surgery, when your hemorrhoid falls off. This is normal.  Some people are able to return to regular activities right away. Others may need to take a few days off work. You will need to avoid heavy lifting and straining with bowel movements while you recover. Follow-up care is a key part of your treatment and safety. Be sure to make and go to all appointments, and call your doctor if you are having problems. It's also a good idea to know your test results and keep a list of the medicines you take. Where can you learn more? Go to https://chpepiceweb.[employer]. org and sign in to your [employer] account. Enter H573 in the [employer] box to learn more about \"Learning About Rubber Band Ligation for Hemorrhoids. \"     If you do not have an account, please click on the \"Sign Up Now\" link. Current as of: April 15, 2020               Content Version: 12.6  © 7647-3218 [employer]. Care instructions adapted under license by [facility] ([facility]). If you have questions about a medical condition or this instruction, always ask your healthcare professional. [address] any warranty or liability for your use of this information.

## 2021-02-20 NOTE — PROGRESS NOTES
[facility] [address]   [address] [facility] [address]  Dept: [phone]  Loc: [phone]  Visit Date: 2/19/2021      Chief Complaint:   Malissa Varma is a 72 y.o. female thatpresents for Pain (started jan 26th  using prep H , suppositories the last week  no BM problems no blood  has had hemroids in the past  no swelling painful to touch  feels like a blister no drainage not getting bigger  )        HPI:  C/o external rectal pain x 4 weeks, 5/10 currently, no radiation, no provoking or alleviating factors identified  Prior to the start of this had a bad episode of diarrhea  Since bowels have been regular, soft formed  Denies any associated nausea or vomiting  Denies any fever, chills, bloody stools  Follows with Dr. Pam Deshpande  Reports similar feelings like this 20+ years ago when she had a hemorrhoid  Been using OTC treatments without any improvement  She comes in today with her   History obtained from pt and medical records. I have reviewed the patient's past medical history, past surgical history, allergies,medications, social and family history and I have made updates where appropriate.     Past Medical History:   Diagnosis Date    Cancer ([facility]     skin cancer    Grief reaction     Hx of blood clots     right lower leg       Past Surgical History:   Procedure Laterality Date    CARPAL TUNNEL RELEASE Left     left    FOOT SURGERY Right     FOOT SURGERY Left     HYSTERECTOMY  1974    AUB    JOINT REPLACEMENT Left     hip    JOINT REPLACEMENT Right     knee    KNEE SURGERY Right     LEG BIOPSY EXCISION Left 12/16/2020    EXCISION SCC LEFT LOWER LEG \"C\" AND LIPOMA LEFT ANKLE WITH FROZEN SECTION performed by Remi Escalera MD at [place] [address] Right 2009    right    SINUS SURGERY         Social History     Tobacco Use    Smoking status: Current Every Day Smoker     Packs/day: 0.50     Years: 30.00 Pack years: 15.00     Types: Cigarettes    Smokeless tobacco: Never Used   Substance Use Topics    Alcohol use: No    Drug use: No       Family History   Problem Relation Age of Onset    Cancer Mother          Review of Systems  Constitutional: Negative for Fever, Chills, Fatigue  Cardiovascular:  Negative forChest Pain, Palpitations  Respiratory:  Negative for Cough, Wheezing, Shortness of breath  Gastrointestinal:  Nausea, Vomiting, Diarrhea, Constipation, Blood in stool +rectal pain  Genitourinary:  Negative for Difficulty or painful urination,Change in frequency, Urgency  Skin:  Negative for Color change, Rash, Itching, Wound  Psychiatric:  Negative forAnxiety, Depression, Suicidal ideation  Musculoskeletal:  Negative for Joint pain, Back pain, Gait problems  Neurological:  Negative for Dizziness, Headaches        PHYSICAL EXAM:  /62   Pulse 88   Temp 98.7 °F (37.1 °C)   Resp 18   Ht 5' 2\" (1.575 m)   Wt 224 lb 12.8 oz (102 kg)   SpO2 98%   BMI 41.12 kg/m²     General Appearance: well developed and well- nourished, in no acute distress  Head: normocephalic and atraumatic  Eyes: pupils equal, round, and reactive to light,  conjunctivae and eye lids without erythema  ENT: external ear normal bilaterally, nose without deformity, nasal mucosa and turbinates normal without polyps, oropharynx normal, dentition is normal for age, no lip or gum lesions noted  Neck: supple and non-tender without mass, no thyromegaly or thyroid nodules, no cervical lymphadenopathy  Pulmonary/Chest: clear to auscultation bilaterally- no wheezes, rales or rhonchi, normal air movement, no respiratory distress orretractions  Cardiovascular: normal rate, regular rhythm, normal S1 and S2, no murmurs, rubs, clicks, or gallops,distal pulses intact  Abdomen: soft, non-tender, non-distended, normal bowel sounds,  no rebound or guarding, nomasses or hernias noted, +external hemorrhoid, no blood or drainage Extremities: no cyanosis, clubbing or edema of the lowerextremities  Musculoskeletal: No joint swelling or gross deformity   Neuro:  Alert, 5/5 strength globally and symmetrically,  normal speech, no focal findings or movement disorder noted, gait wnl  Psych:  Normal affect without evidence of depression or anxiety, insight and judgement are appropriate, memoryappears intact  Skin: warm and dry, no rash or erythema  Lymph:  No cervical, auricular or supraclavicular lymph nodes palpated    ASSESSMENT & PLAN  Agueda was seen today for pain. Diagnoses and all orders for this visit:    Rectal pain    Hemorrhoids, unspecified hemorrhoid type      Called Dr. Camejo Record office, will see her today at 12.30  Gave her info on hemorrhoids  Call with any questions or concerns. No follow-ups on file. Agueda received counseling on the following healthy behaviors: nutrition, exercise and medication adherence  Reviewedprior labs and health maintenance. Continue current medications, diet and exercise. Discussed use, benefit, and side effects of prescribed medications. Barriers to medication compliance addressed. Patient given educationalmaterials - see patient instructions. All patient questions answered. Patient voiced understanding.      Electronically signed by ROVERTO Mejia on 2/20/21 at 11:30 AM EST

## 2021-03-07 ENCOUNTER — HOSPITAL ENCOUNTER (EMERGENCY)
Age: 71
Discharge: HOME | End: 2021-03-07
Attending: FAMILY MEDICINE
Payer: MEDICARE

## 2021-03-07 ENCOUNTER — APPOINTMENT (OUTPATIENT)
Dept: CT IMAGING | Age: 71
End: 2021-03-07
Payer: MEDICARE

## 2021-03-07 VITALS
OXYGEN SATURATION: 96 % | DIASTOLIC BLOOD PRESSURE: 95 MMHG | SYSTOLIC BLOOD PRESSURE: 170 MMHG | BODY MASS INDEX: 38.04 KG/M2 | HEART RATE: 78 BPM | WEIGHT: 208 LBS | RESPIRATION RATE: 16 BRPM | TEMPERATURE: 98.3 F

## 2021-03-07 DIAGNOSIS — G43.809 OTHER MIGRAINE WITHOUT STATUS MIGRAINOSUS, NOT INTRACTABLE: Primary | ICD-10-CM

## 2021-03-07 LAB
BASOPHILS # BLD: 0.7 %
BASOPHILS ABSOLUTE: 0 THOU/MM3 (ref 0–0.1)
EOSINOPHIL # BLD: 1.3 %
EOSINOPHILS ABSOLUTE: 0.1 THOU/MM3 (ref 0–0.4)
ERYTHROCYTE [DISTWIDTH] IN BLOOD BY AUTOMATED COUNT: 13 % (ref 11.5–14.5)
ERYTHROCYTE [DISTWIDTH] IN BLOOD BY AUTOMATED COUNT: 43.9 FL (ref 35–45)
HCT VFR BLD CALC: 43.7 % (ref 37–47)
HEMOGLOBIN: 14.3 GM/DL (ref 12–16)
IMMATURE GRANS (ABS): 0.01 THOU/MM3 (ref 0–0.07)
IMMATURE GRANULOCYTES: 0.1 %
LYMPHOCYTES # BLD: 20.5 %
LYMPHOCYTES ABSOLUTE: 1.4 THOU/MM3 (ref 1–4.8)
MCH RBC QN AUTO: 29.9 PG (ref 26–33)
MCHC RBC AUTO-ENTMCNC: 32.7 GM/DL (ref 32.2–35.5)
MCV RBC AUTO: 91.4 FL (ref 81–99)
MONOCYTES # BLD: 4.8 %
MONOCYTES ABSOLUTE: 0.3 THOU/MM3 (ref 0.4–1.3)
NUCLEATED RED BLOOD CELLS: 0 /100 WBC
PLATELET # BLD: 200 THOU/MM3 (ref 130–400)
PMV BLD AUTO: 10.7 FL (ref 9.4–12.4)
RBC # BLD: 4.78 MILL/MM3 (ref 4.2–5.4)
SEDIMENTATION RATE, ERYTHROCYTE: 38 MM/HR (ref 0–20)
SEG NEUTROPHILS: 72.6 %
SEGMENTED NEUTROPHILS ABSOLUTE COUNT: 4.9 THOU/MM3 (ref 1.8–7.7)
WBC # BLD: 6.7 THOU/MM3 (ref 4.8–10.8)

## 2021-03-07 PROCEDURE — 99283 EMERGENCY DEPT VISIT LOW MDM: CPT

## 2021-03-07 PROCEDURE — 85651 RBC SED RATE NONAUTOMATED: CPT

## 2021-03-07 PROCEDURE — 96372 THER/PROPH/DIAG INJ SC/IM: CPT

## 2021-03-07 PROCEDURE — 6360000002 HC RX W HCPCS: Performed by: FAMILY MEDICINE

## 2021-03-07 PROCEDURE — 85025 COMPLETE CBC W/AUTO DIFF WBC: CPT

## 2021-03-07 PROCEDURE — 6370000000 HC RX 637 (ALT 250 FOR IP): Performed by: FAMILY MEDICINE

## 2021-03-07 PROCEDURE — 70450 CT HEAD/BRAIN W/O DYE: CPT

## 2021-03-07 PROCEDURE — 36415 COLL VENOUS BLD VENIPUNCTURE: CPT

## 2021-03-07 RX ORDER — BUTALBITAL, ACETAMINOPHEN AND CAFFEINE 50; 325; 40 MG/1; MG/1; MG/1
1 TABLET ORAL ONCE
Status: COMPLETED | OUTPATIENT
Start: 2021-03-07 | End: 2021-03-07

## 2021-03-07 RX ORDER — BUTALBITAL, ASPIRIN, AND CAFFEINE 325; 50; 40 MG/1; MG/1; MG/1
1 CAPSULE ORAL EVERY 6 HOURS PRN
Qty: 20 CAPSULE | Refills: 0 | Status: SHIPPED | OUTPATIENT
Start: 2021-03-07 | End: 2021-03-18

## 2021-03-07 RX ORDER — KETOROLAC TROMETHAMINE 30 MG/ML
30 INJECTION, SOLUTION INTRAMUSCULAR; INTRAVENOUS ONCE
Status: COMPLETED | OUTPATIENT
Start: 2021-03-07 | End: 2021-03-07

## 2021-03-07 RX ADMIN — KETOROLAC TROMETHAMINE 30 MG: 30 INJECTION, SOLUTION INTRAMUSCULAR; INTRAVENOUS at 15:01

## 2021-03-07 RX ADMIN — BUTALBITAL, ACETAMINOPHEN, AND CAFFEINE 1 TABLET: 50; 325; 40 TABLET ORAL at 12:58

## 2021-03-07 ASSESSMENT — PAIN SCALES - GENERAL
PAINLEVEL_OUTOF10: 9
PAINLEVEL_OUTOF10: 6
PAINLEVEL_OUTOF10: 9

## 2021-03-07 ASSESSMENT — PAIN DESCRIPTION - ONSET: ONSET: ON-GOING

## 2021-03-07 ASSESSMENT — PAIN DESCRIPTION - LOCATION: LOCATION: HEAD

## 2021-03-07 ASSESSMENT — PAIN DESCRIPTION - PROGRESSION: CLINICAL_PROGRESSION: GRADUALLY WORSENING

## 2021-03-07 ASSESSMENT — PAIN DESCRIPTION - PAIN TYPE: TYPE: ACUTE PAIN

## 2021-03-07 ASSESSMENT — PAIN DESCRIPTION - DESCRIPTORS
DESCRIPTORS: ACHING;SHARP
DESCRIPTORS: ACHING;SHARP

## 2021-03-07 NOTE — ED NOTES
Patient is resting in bed with easy and unlabored respirations. Call light in reach. Side rails up x2. Patient denies further complaints or concerns. Will monitor.         Tim Lua RN  03/07/21 9278

## 2021-03-07 NOTE — ED NOTES
Visual acuity as follows      Right: 20/30  Left: 20/40    Bilateral:20/30     Elliot Kaur RN  03/07/21 5543

## 2021-03-07 NOTE — ED PROVIDER NOTES
[address] COMPLAINT   Chief Complaint   Patient presents with    Headache      HPI     Dottie Ceja is a 72 y.o. female who presents with gradual onset of a headache since the onset a few days ago. The duration has been constant since the onset. The quality of the headache is migraine like but unlike her typical headaches. This headache is not the worst headache of the patient's life. The patient has associated nausea and left temple pain, and endorsed some blurry vision. No rash or fever was endorsed. The pain worsens with exposure to bright light. REVIEW OF SYSTEMS   Neurologic: +headache; No LOC or stiff neck   Cardiac: No Chest Pain, No syncope   Respiratory: No cough or difficulty breathing   GI: No Bloody Stool or Diarrhea   : No Dysuria or Hematuria   General: No Fever   All other systems reviewed and are negative. PAST MEDICAL & SURGICAL HISTORY   Past Medical History:   Diagnosis Date    Cancer ([facility] [address]     skin cancer    Grief reaction     Hx of blood clots     right lower leg      Past Surgical History:   Procedure Laterality Date    CARPAL TUNNEL RELEASE Left     left    FOOT SURGERY Right     FOOT SURGERY Left     HYSTERECTOMY  1974    AUB    JOINT REPLACEMENT Left     hip    JOINT REPLACEMENT Right     knee    KNEE SURGERY Right     LEG BIOPSY EXCISION Left 12/16/2020    EXCISION SCC LEFT LOWER LEG \"C\" AND LIPOMA LEFT ANKLE WITH FROZEN SECTION performed by Jesus Garcia MD at [place] [id number] Right 2009    right    SINUS SURGERY        CURRENT MEDICATIONS   Current Outpatient Rx   Medication Sig Dispense Refill    butalbital-aspirin-caffeine (FIORINAL) -40 MG capsule Take 1 capsule by mouth every 6 hours as needed for Headaches or Migraine for up to 20 doses.  20 capsule 0    citalopram (CELEXA) 20 MG tablet Take 1 tablet by mouth once daily 90 tablet 3    acetaminophen (TYLENOL) 325 MG tablet Take 650 mg by mouth every 6 hours as needed for Pain      vitamin B-12 (CYANOCOBALAMIN) 500 MCG tablet Take 500 mcg by mouth daily      folic acid (FOLVITE) 1 MG tablet Take 1 mg by mouth daily.  melatonin 3 MG TABS tablet Take 3 mg by mouth daily.  Calcium Carb-Cholecalciferol (CALCIUM + D3) 600-200 MG-UNIT TABS Take 2 tablets by mouth daily.         ALLERGIES   No Known Allergies   SOCIAL & FAMILY HISTORY   Social History     Socioeconomic History    Marital status:      Spouse name: Not on file    Number of children: Not on file    Years of education: Not on file    Highest education level: Not on file   Occupational History    Not on file   Social Needs    Financial resource strain: Not hard at all    Food insecurity     Worry: Never true     Inability: Never true   [email or web address] needs     Medical: No     Non-medical: No   Tobacco Use    Smoking status: Current Every Day Smoker     Packs/day: 0.50     Years: 30.00     Pack years: 15.00     Types: Cigarettes    Smokeless tobacco: Never Used   Substance and Sexual Activity    Alcohol use: No    Drug use: No    Sexual activity: Not on file   Lifestyle    Physical activity     Days per week: Not on file     Minutes per session: Not on file    Stress: Not on file   Relationships    Social connections     Talks on phone: Not on file     Gets together: Not on file     Attends [employer] service: Not on file     Active member of club or organization: Not on file     Attends meetings of clubs or organizations: Not on file     Relationship status: Not on file    Intimate partner violence     Fear of current or ex partner: Not on file     Emotionally abused: Not on file     Physically abused: Not on file     Forced sexual activity: Not on file   Other Topics Concern    Not on file   Social History Narrative    Not on file      Family History   Problem Relation Age of Onset    Cancer Mother         PHYSICAL EXAM   VITAL SIGNS: BP (!) 170/95   Pulse recognition program)        Claudell Ape, MD  03/07/21 [id number]

## 2021-03-07 NOTE — ED TRIAGE NOTES
Pt presents to the ER for a headache that has been on going for 2 weeks. Pt states that she did hit her head in December and was not seen for it. Pt states that she bent over 2 weeks ago and it felt like someone hit her in the forehead. She has had a headache since.

## 2021-03-18 ENCOUNTER — OFFICE VISIT (OUTPATIENT)
Dept: FAMILY MEDICINE CLINIC | Age: 71
End: 2021-03-18
Payer: MEDICARE

## 2021-03-18 VITALS
WEIGHT: 225 LBS | BODY MASS INDEX: 41.15 KG/M2 | OXYGEN SATURATION: 98 % | SYSTOLIC BLOOD PRESSURE: 128 MMHG | DIASTOLIC BLOOD PRESSURE: 88 MMHG | RESPIRATION RATE: 17 BRPM | TEMPERATURE: 98.6 F | HEART RATE: 78 BPM

## 2021-03-18 DIAGNOSIS — Z91.81 AT HIGH RISK FOR FALLS: ICD-10-CM

## 2021-03-18 DIAGNOSIS — R51.9 FRONTAL HEADACHE: Primary | ICD-10-CM

## 2021-03-18 PROCEDURE — 4040F PNEUMOC VAC/ADMIN/RCVD: CPT | Performed by: FAMILY MEDICINE

## 2021-03-18 PROCEDURE — 3017F COLORECTAL CA SCREEN DOC REV: CPT | Performed by: FAMILY MEDICINE

## 2021-03-18 PROCEDURE — 1111F DSCHRG MED/CURRENT MED MERGE: CPT | Performed by: FAMILY MEDICINE

## 2021-03-18 PROCEDURE — 99213 OFFICE O/P EST LOW 20 MIN: CPT | Performed by: FAMILY MEDICINE

## 2021-03-18 PROCEDURE — G8417 CALC BMI ABV UP PARAM F/U: HCPCS | Performed by: FAMILY MEDICINE

## 2021-03-18 PROCEDURE — G8400 PT W/DXA NO RESULTS DOC: HCPCS | Performed by: FAMILY MEDICINE

## 2021-03-18 PROCEDURE — 1123F ACP DISCUSS/DSCN MKR DOCD: CPT | Performed by: FAMILY MEDICINE

## 2021-03-18 PROCEDURE — G8427 DOCREV CUR MEDS BY ELIG CLIN: HCPCS | Performed by: FAMILY MEDICINE

## 2021-03-18 PROCEDURE — G8484 FLU IMMUNIZE NO ADMIN: HCPCS | Performed by: FAMILY MEDICINE

## 2021-03-18 PROCEDURE — 4004F PT TOBACCO SCREEN RCVD TLK: CPT | Performed by: FAMILY MEDICINE

## 2021-03-18 PROCEDURE — 1090F PRES/ABSN URINE INCON ASSESS: CPT | Performed by: FAMILY MEDICINE

## 2021-03-18 RX ORDER — PREDNISONE 20 MG/1
40 TABLET ORAL DAILY
Qty: 14 TABLET | Refills: 0 | Status: SHIPPED | OUTPATIENT
Start: 2021-03-18 | End: 2021-03-25

## 2021-03-18 RX ORDER — AMOXICILLIN AND CLAVULANATE POTASSIUM 875; 125 MG/1; MG/1
1 TABLET, FILM COATED ORAL 2 TIMES DAILY
Qty: 20 TABLET | Refills: 0 | Status: SHIPPED | OUTPATIENT
Start: 2021-03-18 | End: 2021-03-28

## 2021-03-18 NOTE — PROGRESS NOTES
Milad Rodriguez is a 72 y.o. female that presents for     Chief Complaint   Patient presents with    Follow-Up from Hospital     migraine  across the top can feel movement when bending        /88   Pulse 78   Temp 98.6 °F (37 °C)   Resp 17   Wt 225 lb (102.1 kg)   SpO2 98%   BMI 41.15 kg/m²       HPI:    Headaches    HPI:  Patient fell out of bed on 12/1/20 and hit the left side of her head. Had swelling at that time, did not seek care at that time. Began having left frontal pain about 1 month ago. Seen in the ER on 3/7/21, did have a CT scan. Description of headaches - aching pain in the left frontal area, will get worse with bending. Pain is constant. No changes in vision, no nausea/vomiting. Feels different from previous migraines. Frequency of Headaches - has been relatively constant for the past month  Level of disability - rates as a 9/10 as far as discomfort    Currently on prophylactic therapy? No  Taking abortive therapy? No    Associated Aura? No  Photophobia, Phonophobia? No  Nausea or Vomiting? No  Recent change in Headaches? Yes  Do headaches awaken her from sleep?   Yes      Health Maintenance   Topic Date Due    Lipid screen  Never done    Diabetes screen  Never done    DTaP/Tdap/Td vaccine (1 - Tdap) 10/20/2021 (Originally 10/22/1969)    Hepatitis C screen  10/20/2021 (Originally 1950)   [facility] Annual Wellness Visit (AWV)  10/21/2021    Colon cancer screen colonoscopy  12/21/2021    Breast cancer screen  10/26/2022    DEXA (modify frequency per FRAX score)  Completed    Flu vaccine  Completed    Shingles Vaccine  Completed    Pneumococcal 65+ years Vaccine  Completed    COVID-19 Vaccine  Completed    Hepatitis A vaccine  Aged Out    Hepatitis B vaccine  Aged Out    Hib vaccine  Aged Out    Meningococcal (ACWY) vaccine  Aged Out       Past Medical History:   Diagnosis Date    Cancer ([address]     skin cancer    Grief reaction     Hx of blood clots right lower leg       Past Surgical History:   Procedure Laterality Date    CARPAL TUNNEL RELEASE Left     left    FOOT SURGERY Right     FOOT SURGERY Left     HYSTERECTOMY  1974    AUB    JOINT REPLACEMENT Left     hip    JOINT REPLACEMENT Right     knee    KNEE SURGERY Right     LEG BIOPSY EXCISION Left 12/16/2020    EXCISION SCC LEFT LOWER LEG \"C\" AND LIPOMA LEFT ANKLE WITH FROZEN SECTION performed by Quinn Palomino MD at [place] [address] Right 2009    right    SINUS SURGERY         Social History     Tobacco Use    Smoking status: Current Every Day Smoker     Packs/day: 0.50     Years: 30.00     Pack years: 15.00     Types: Cigarettes    Smokeless tobacco: Never Used   Substance Use Topics    Alcohol use: No    Drug use: No       Family History   Problem Relation Age of Onset    Cancer Mother          I have reviewed the patient's past medical history, past surgical history, allergies, medications, social and family history and I have made updates where appropriate.     Review of Systems        PHYSICAL EXAM:  /88   Pulse 78   Temp 98.6 °F (37 °C)   Resp 17   Wt 225 lb (102.1 kg)   SpO2 98%   BMI 41.15 kg/m²     General Appearance: well developed and well- nourished, in no acute distress  Head: normocephalic and atraumatic, there is some frontal tenderness,   ENT: external ear and ear canal normal bilaterally, nose without deformity, nasal mucosa and turbinates normal without polyps, oropharynx normal, dentition is normal for age, no lipor gum lesions noted  Neck: supple and non-tender without mass, no thyromegaly or thyroid nodules, no cervical lymphadenopathy  Pulmonary/Chest: clear to auscultation bilaterally- no wheezes, rales or rhonchi, normal air movement, no respiratory distress or retractions  Cardiovascular: normal rate, regular rhythm, normal S1 and S2, no murmurs, rubs, clicks, or gallops, distal pulses intact  Extremities: no cyanosis,

## 2021-09-22 ENCOUNTER — OFFICE VISIT (OUTPATIENT)
Dept: FAMILY MEDICINE CLINIC | Age: 71
End: 2021-09-22
Payer: MEDICARE

## 2021-09-22 ENCOUNTER — TELEPHONE (OUTPATIENT)
Dept: FAMILY MEDICINE CLINIC | Age: 71
End: 2021-09-22

## 2021-09-22 VITALS
WEIGHT: 224 LBS | BODY MASS INDEX: 41.22 KG/M2 | DIASTOLIC BLOOD PRESSURE: 82 MMHG | HEART RATE: 92 BPM | SYSTOLIC BLOOD PRESSURE: 142 MMHG | HEIGHT: 62 IN | TEMPERATURE: 96.9 F | RESPIRATION RATE: 16 BRPM

## 2021-09-22 DIAGNOSIS — H92.01 RIGHT EAR PAIN: ICD-10-CM

## 2021-09-22 DIAGNOSIS — H66.90 ACUTE OTITIS MEDIA, UNSPECIFIED OTITIS MEDIA TYPE: Primary | ICD-10-CM

## 2021-09-22 PROCEDURE — 1090F PRES/ABSN URINE INCON ASSESS: CPT | Performed by: NURSE PRACTITIONER

## 2021-09-22 PROCEDURE — G8400 PT W/DXA NO RESULTS DOC: HCPCS | Performed by: NURSE PRACTITIONER

## 2021-09-22 PROCEDURE — 3017F COLORECTAL CA SCREEN DOC REV: CPT | Performed by: NURSE PRACTITIONER

## 2021-09-22 PROCEDURE — 4040F PNEUMOC VAC/ADMIN/RCVD: CPT | Performed by: NURSE PRACTITIONER

## 2021-09-22 PROCEDURE — G8427 DOCREV CUR MEDS BY ELIG CLIN: HCPCS | Performed by: NURSE PRACTITIONER

## 2021-09-22 PROCEDURE — 1123F ACP DISCUSS/DSCN MKR DOCD: CPT | Performed by: NURSE PRACTITIONER

## 2021-09-22 PROCEDURE — 4004F PT TOBACCO SCREEN RCVD TLK: CPT | Performed by: NURSE PRACTITIONER

## 2021-09-22 PROCEDURE — 99214 OFFICE O/P EST MOD 30 MIN: CPT | Performed by: NURSE PRACTITIONER

## 2021-09-22 PROCEDURE — G8417 CALC BMI ABV UP PARAM F/U: HCPCS | Performed by: NURSE PRACTITIONER

## 2021-09-22 RX ORDER — AMOXICILLIN AND CLAVULANATE POTASSIUM 875; 125 MG/1; MG/1
1 TABLET, FILM COATED ORAL 2 TIMES DAILY
Qty: 14 TABLET | Refills: 0 | Status: SHIPPED | OUTPATIENT
Start: 2021-09-22 | End: 2021-09-29

## 2021-09-22 RX ORDER — PREDNISONE 20 MG/1
40 TABLET ORAL DAILY
Qty: 10 TABLET | Refills: 0 | Status: SHIPPED | OUTPATIENT
Start: 2021-09-22 | End: 2021-09-27

## 2021-09-22 NOTE — TELEPHONE ENCOUNTER
----- Message from Christine Villagomez sent at 9/22/2021  8:36 AM EDT -----  Subject: Appointment Request    Reason for Call: Urgent (Patient Request) No Script    QUESTIONS  Type of Appointment? Established Patient  Reason for appointment request? Available appointments did not meet   patient need  Additional Information for Provider? Patient has right ear pain for four   days. The next available appointment is 09/27. She is wanting to know if   she can be seen this week or if medication for her symptoms can be sent to   her pharmacy. Can you please contact patient with information?  ---------------------------------------------------------------------------  --------------  CALL BACK INFO  What is the best way for the office to contact you? OK to leave message on   voicemail  Preferred Call Back Phone Number? [phone]  ---------------------------------------------------------------------------  --------------  SCRIPT ANSWERS  Relationship to Patient? Self  (Is the patient requesting to see the provider for a procedure?)? No  (Is the patient requesting to see the provider urgently  today or   tomorrow. )? Yes  Have you been diagnosed with, awaiting test results for, or told that you   are suspected of having COVID-19 (Coronavirus)? (If patient has tested   negative or was tested as a requirement for work, school, or travel and   not based on symptoms, answer no)? No  Within the past two weeks have you developed any of the following symptoms   (answer no if symptoms have been present longer than 2 weeks or began   more than 2 weeks ago)? Fever or Chills, Cough, Shortness of breath or   difficulty breathing, Loss of taste or smell, Sore throat, Nasal   congestion, Sneezing or runny nose, Fatigue or generalized body aches   (answer no if pain is specific to a body part e.g. back pain), Diarrhea,   Headache? No  Have you had close contact with someone with COVID-19 in the last 14 days?    No  (Service Expert  click yes below to proceed with [employer] As Usual   Scheduling)?  Yes

## 2021-09-22 NOTE — PATIENT INSTRUCTIONS
Patient Education        Earache: Care Instructions  Your Care Instructions     Even though infection is a common cause of ear pain, not all ear pain means an infection. If you have ear pain and don't have an infection, it could be because of a jaw problem, such as temporomandibular joint (TMJ) pain. Or it could be because of a neck problem. When ear discomfort or pain is mild or comes and goes without other symptoms, home treatment may be all you need. Follow-up care is a key part of your treatment and safety. Be sure to make and go to all appointments, and call your doctor if you are having problems. It's also a good idea to know your test results and keep a list of the medicines you take. How can you care for yourself at home? · Apply heat on the ear to ease pain. To apply heat, put a warm water bottle, a heating pad set on low, or a warm cloth on your ear. Do not go to sleep with a heating pad on your skin. · Take an over-the-counter pain medicine, such as acetaminophen (Tylenol), ibuprofen (Advil, Motrin), or naproxen (Aleve). Be safe with medicines. Read and follow all instructions on the label. · Do not take two or more pain medicines at the same time unless the doctor told you to. Many pain medicines have acetaminophen, which is Tylenol. Too much acetaminophen (Tylenol) can be harmful. · Never insert anything, such as a cotton swab or a [place] pin, into the ear. When should you call for help? Call your doctor now or seek immediate medical care if:    · You have new or worse symptoms of infection, such as:  ? Increased pain, swelling, warmth, or redness. ? Red streaks leading from the area. ? Pus draining from the area. ? A fever. Watch closely for changes in your health, and be sure to contact your doctor if:    · You have new or worse discharge coming from the ear.     · You do not get better as expected. Where can you learn more? Go to https://xin.health-partners. org and sign in to your [employer] account. Enter C609 in the [facility] box to learn more about \"Earache: Care Instructions. \"     If you do not have an account, please click on the \"Sign Up Now\" link. Current as of: December 2, 2020               Content Version: 12.9  © 2006-2021 [employer]. Care instructions adapted under license by [facility] ([facility]). If you have questions about a medical condition or this instruction, always ask your healthcare professional. [place] [address] any warranty or liability for your use of this information. Patient Education        Ear Infection (Otitis Media): Care Instructions  Overview     An ear infection may start with a cold and affect the middle ear (otitis media). It can hurt a lot. Most ear infections clear up on their own in a couple of days and do not need antibiotics. Also, antibiotics do not work against viruses, which may be the cause of your infection. Regular doses of pain relievers are the best way to reduce your fever and help you feel better. Follow-up care is a key part of your treatment and safety. Be sure to make and go to all appointments, and call your doctor if you are having problems. It's also a good idea to know your test results and keep a list of the medicines you take. How can you care for yourself at home? · Take pain medicines exactly as directed. ? If the doctor gave you a prescription medicine for pain, take it as prescribed. ? If you are not taking a prescription pain medicine, take an over-the-counter medicine, such as acetaminophen (Tylenol), ibuprofen (Advil, Motrin), or naproxen (Aleve). Read and follow all instructions on the label. ? Do not take two or more pain medicines at the same time unless the doctor told you to. Many pain medicines have acetaminophen, which is Tylenol. Too much acetaminophen (Tylenol) can be harmful. · Plan to take a full dose of pain reliever before bedtime.  Getting enough sleep will help you get better. · Try a warm, moist washcloth on the ear. It may help relieve pain. · If your doctor prescribed antibiotics, take them as directed. Do not stop taking them just because you feel better. You need to take the full course of antibiotics. When should you call for help? Call your doctor now or seek immediate medical care if:    · You have new or increasing ear pain.     · You have new or increasing pus or blood draining from your ear.     · You have a fever with a stiff neck or a severe headache. Watch closely for changes in your health, and be sure to contact your doctor if:    · You have new or worse symptoms.     · You are not getting better after taking an antibiotic for 2 days. Where can you learn more? Go to https://Webcrumbz.[email or web address] org and sign in to your [employer] account. Enter K501 in the [employer] box to learn more about \"Ear Infection (Otitis Media): Care Instructions. \"     If you do not have an account, please click on the \"Sign Up Now\" link. Current as of: December 2, 2020               Content Version: 12.9  © 3484-8915 Healthwise[employer] Incorporated. Care instructions adapted under license by [facility] ([facility]). If you have questions about a medical condition or this instruction, always ask your healthcare professional. [address] any warranty or liability for your use of this information.

## 2021-09-22 NOTE — PROGRESS NOTES
SUBJECTIVE:  Aliza Portillo is a 72 y.o. y/o female that presents with Otalgia (Patient has been experiencing right ear pain for the past 5 days.)  .    right Ear Complaint    HPI:  Symptoms have been present for 5 day(s). Inciting incident or history of trauma? no  Decreased hearing? No  Ear tenderness? Yes  Ear drainage? No  Feeling of fullness? Yes  Dizziness or pre-syncope? Yes - once recently at a wedding, not since  Associated symptoms - no other symptoms    Patient Active Problem List   Diagnosis    Anxiety    Primary osteoarthritis of right knee    History of DVT (deep vein thrombosis)    Morbid obesity with BMI of 40.0-44.9, adult ([facility])           OBJECTIVE:  BP (!) 142/82 (Site: Right Upper Arm, Position: Sitting, Cuff Size: Large Adult)   Pulse 92   Temp 96.9 °F (36.1 °C) (Temporal)   Resp 16   Ht 5' 2\" (1.575 m)   Wt 224 lb (101.6 kg)   BMI 40.97 kg/m²   General appearance: alert, well appearing, and in no distress. HEENT:  right TM purulent middle ear fluid, left TM purulent middle ear fluid and normal canals bilaterally, neck without nodes and throat normal without erythema or exudate  Neck:  Supple without masses, no cervical adenopathy  Skin exam - normal coloration and turgor, no rashes, no suspicious skin lesions noted. Psych:  No evidence of anxiety or depression      ASSESSMENT & PLAN  Jeff Steele was seen today for otalgia. Diagnoses and all orders for this visit:    Acute otitis media, unspecified otitis media type  -     amoxicillin-clavulanate (AUGMENTIN) 875-125 MG per tablet; Take 1 tablet by mouth 2 times daily for 7 days  -     predniSONE (DELTASONE) 20 MG tablet; Take 2 tablets by mouth daily for 5 days    Right ear pain  -     amoxicillin-clavulanate (AUGMENTIN) 875-125 MG per tablet; Take 1 tablet by mouth 2 times daily for 7 days  -     predniSONE (DELTASONE) 20 MG tablet;  Take 2 tablets by mouth daily for 5 days        No follow-ups on file.    -Start above treatments  -Patient advised to contact our office immediately if symptoms worsen or persist  -Patient counseled on conservative care including OTC meds

## 2021-10-21 ENCOUNTER — OFFICE VISIT (OUTPATIENT)
Dept: FAMILY MEDICINE CLINIC | Age: 71
End: 2021-10-21
Payer: MEDICARE

## 2021-10-21 VITALS
DIASTOLIC BLOOD PRESSURE: 100 MMHG | HEIGHT: 62 IN | WEIGHT: 223.4 LBS | RESPIRATION RATE: 16 BRPM | TEMPERATURE: 97.1 F | HEART RATE: 72 BPM | SYSTOLIC BLOOD PRESSURE: 160 MMHG | OXYGEN SATURATION: 94 % | BODY MASS INDEX: 41.11 KG/M2

## 2021-10-21 DIAGNOSIS — M17.11 PRIMARY OSTEOARTHRITIS OF RIGHT KNEE: ICD-10-CM

## 2021-10-21 DIAGNOSIS — Z00.00 ROUTINE GENERAL MEDICAL EXAMINATION AT A HEALTH CARE FACILITY: Primary | ICD-10-CM

## 2021-10-21 DIAGNOSIS — Z23 NEED FOR IMMUNIZATION AGAINST INFLUENZA: ICD-10-CM

## 2021-10-21 PROCEDURE — 1123F ACP DISCUSS/DSCN MKR DOCD: CPT | Performed by: FAMILY MEDICINE

## 2021-10-21 PROCEDURE — G0439 PPPS, SUBSEQ VISIT: HCPCS | Performed by: FAMILY MEDICINE

## 2021-10-21 PROCEDURE — 3017F COLORECTAL CA SCREEN DOC REV: CPT | Performed by: FAMILY MEDICINE

## 2021-10-21 PROCEDURE — 4040F PNEUMOC VAC/ADMIN/RCVD: CPT | Performed by: FAMILY MEDICINE

## 2021-10-21 PROCEDURE — G8484 FLU IMMUNIZE NO ADMIN: HCPCS | Performed by: FAMILY MEDICINE

## 2021-10-21 ASSESSMENT — PATIENT HEALTH QUESTIONNAIRE - PHQ9
SUM OF ALL RESPONSES TO PHQ QUESTIONS 1-9: 0
SUM OF ALL RESPONSES TO PHQ QUESTIONS 1-9: 0
SUM OF ALL RESPONSES TO PHQ9 QUESTIONS 1 & 2: 0
2. FEELING DOWN, DEPRESSED OR HOPELESS: 0
SUM OF ALL RESPONSES TO PHQ QUESTIONS 1-9: 0
1. LITTLE INTEREST OR PLEASURE IN DOING THINGS: 0

## 2021-10-21 ASSESSMENT — LIFESTYLE VARIABLES
AUDIT TOTAL SCORE: INCOMPLETE
HOW OFTEN DO YOU HAVE A DRINK CONTAINING ALCOHOL: NEVER
HOW OFTEN DO YOU HAVE A DRINK CONTAINING ALCOHOL: 0
AUDIT-C TOTAL SCORE: INCOMPLETE

## 2021-10-21 NOTE — PROGRESS NOTES
Patient Care Team:  Levy Bellamy DO as PCP - General (Family Medicine)  Levy Bellamy DO as PCP - [facility] Empaneled Provider    Wt Readings from Last 3 Encounters:   10/21/21 223 lb 6.4 oz (101.3 kg)   09/22/21 224 lb (101.6 kg)   03/18/21 225 lb (102.1 kg)     Vitals:    10/21/21 1440   BP: (!) 160/100   Pulse: 72   Resp: 16   Temp: 97.1 °F (36.2 °C)   TempSrc: Infrared   SpO2: 94%   Weight: 223 lb 6.4 oz (101.3 kg)   Height: 5' 2\" (1.575 m)     Body mass index is 40.86 kg/m². Based upon direct observation of the patient, evaluation of cognition reveals recent and remote memory intact. General Appearance: alert and oriented to person, place and time, well developed and well- nourished, in no acute distress  Skin: warm and dry, no rash or erythema  Pulmonary/Chest: clear to auscultation bilaterally- no wheezes, rales or rhonchi, normal air movement, no respiratory distress  Cardiovascular: normal rate, regular rhythm, normal S1 and S2, no murmurs, rubs, clicks, or gallops, distal pulses intact, no carotid bruits  Extremities: no cyanosis, clubbing or edema    Patient's complete Health Risk Assessment and screening values have been reviewed and are found in Flowsheets. The following problems were reviewed today and where indicated follow up appointments were made and/or referrals ordered. Positive Risk Factor Screenings with Interventions:         Substance History:  Social History     Tobacco History     Smoking Status  Current Every Day Smoker Smoking Frequency  0.5 packs/day for 30 years (15 pk yrs) Smoking Tobacco Type  Cigarettes    Smokeless Tobacco Use  Never Used          Alcohol History     Alcohol Use Status  No          Drug Use     Drug Use Status  No          Sexual Activity     Sexually Active  Not Asked               Alcohol Screening:       A score of 8 or more is associated with harmful or hazardous drinking.  A score of 13 or more in women, and 15 or more in men, is likely to indicate alcohol dependence.   Substance Abuse Interventions:  · Tobacco abuse:  patient is not ready to work toward tobacco cessation at this time     Health Habits/Nutrition:  Health Habits/Nutrition  Do you exercise for at least 20 minutes 2-3 times per week?: (!) No  Have you lost any weight without trying in the past 3 months?: No  Do you eat only one meal per day?: No  Have you seen the dentist within the past year?: Yes  Body mass index: (!) 40.86  Health Habits/Nutrition Interventions:  · Advised on regular physical activity    Hearing/Vision:  No exam data present  Hearing/Vision  Do you or your family notice any trouble with your hearing that hasn't been managed with hearing aids?: No  Do you have difficulty driving, watching TV, or doing any of your daily activities because of your eyesight?: No  Have you had an eye exam within the past year?: (!) No  Hearing/Vision Interventions:  · Vision concerns:  patient encouraged to make appointment with his/her eye specialist      Personalized Preventive Plan   Current Health Maintenance Status  Immunization History   Administered Date(s) Administered    COVID-19, Lim Peter, PF, 30mcg/0.3mL 02/11/2021, 03/04/2021    Influenza, High Dose (Fluzone 65 yrs and older) 10/04/2017    Influenza, Tito Staple, IM, (6 mo and older Fluzone, Flulaval, Fluarix and 3 yrs and older Afluria) 10/04/2016    Influenza, Quadv, IM, PF (6 mo and older Fluzone, Flulaval, Fluarix, and 3 yrs and older Afluria) 10/15/2018    Influenza, Quadv, adjuvanted, 65 yrs +, IM, PF (Fluad) 10/20/2020    Influenza, Triv, inactivated, subunit, adjuvanted, IM (Fluad 65 yrs and older) 10/16/2019    Pneumococcal Conjugate 13-valent ([id number]) 10/04/2017    Pneumococcal Polysaccharide ([id number]) 10/15/2018    Td, unspecified formulation 06/07/2015    Zoster Recombinant (Shingrix) 08/16/2018, 02/12/2019        Health Maintenance   Topic Date Due    Hepatitis C screen  Never done    Lipid screen  Never done    Diabetes screen  Never done    DTaP/Tdap/Td vaccine (1 - Tdap) 06/08/2015    Flu vaccine (1) 09/01/2021    COVID-19 Vaccine (3 - Pfizer booster) 09/04/2021    Annual Wellness Visit (AWV)  10/21/2021    Breast cancer screen  10/26/2022    Colon cancer screen colonoscopy  03/04/2031    DEXA (modify frequency per FRAX score)  Completed    Shingles Vaccine  Completed    Pneumococcal 65+ years Vaccine  Completed    Hepatitis A vaccine  Aged Out    Hepatitis B vaccine  Aged Out    Hib vaccine  Aged Out    Meningococcal (ACWY) vaccine  Aged Out     Recommendations for [employer] Due: see orders and patient instructions/AVS.  . Recommended screening schedule for the next 5-10 years is provided to the patient in written form: see Patient Instructions/AVS.    Sruthi Mcmahan was seen today for medicare awv.     Diagnoses and all orders for this visit:    Routine general medical examination at a health care facility    Need for immunization against influenza  -     INFLUENZA, QUADV, ADJUVANTED, 72 YRS =, IM, PF, PREFILL SYR, 0.5ML (FLUAD)    Primary osteoarthritis of right knee  -     Handicap Placard MISC; by Does not apply route Expires 10/21/26

## 2021-10-21 NOTE — PROGRESS NOTES
Vaccine Information Sheet, \"Influenza - Inactivated\"  given to [employer], or parent/legal guardian of  Yovani Pizarro and verbalized understanding. Patient responses:    Have you ever had a reaction to a flu vaccine? No  Do you have an allergy to eggs, neomycin or polymixin? No  Do you have an allergy to Thimerosal, contact lens solution, or Merthiolate? No  Have you ever had [place] Syndrome? No  Do you have any current illness? No  Do you have a temperature above 100 degrees? No  Are you pregnant? No  If pregnant, permission obtained from physician? No  Do you have an active neurological disorder? No      Flu vaccine given per order. Please see immunization tab.

## 2021-10-21 NOTE — PATIENT INSTRUCTIONS
Personalized Preventive Plan for Kandice Up - 10/21/2021  Medicare offers a range of preventive health benefits. Some of the tests and screenings are paid in full while other may be subject to a deductible, co-insurance, and/or copay. Some of these benefits include a comprehensive review of your medical history including lifestyle, illnesses that may run in your family, and various assessments and screenings as appropriate. After reviewing your medical record and screening and assessments performed today your provider may have ordered immunizations, labs, imaging, and/or referrals for you. A list of these orders (if applicable) as well as your Preventive Care list are included within your After Visit Summary for your review. Other Preventive Recommendations:    · A preventive eye exam performed by an eye specialist is recommended every 1-2 years to screen for glaucoma; cataracts, macular degeneration, and other eye disorders. · A preventive dental visit is recommended every 6 months. · Try to get at least 150 minutes of exercise per week or 10,000 steps per day on a pedometer . · Order or download the FREE \"Exercise & Physical Activity: Your Everyday Guide\" from [employer]. Call 7-812.164.3124 or search [employer] online. · You need 8260-0774 mg of calcium and 7832-9421 IU of vitamin D per day. It is possible to meet your calcium requirement with diet alone, but a vitamin D supplement is usually necessary to meet this goal.  · When exposed to the sun, use a sunscreen that protects against both UVA and UVB radiation with an SPF of 30 or greater. Reapply every 2 to 3 hours or after sweating, drying off with a towel, or swimming. · Always wear a seat belt when traveling in a car. Always wear a helmet when riding a bicycle or motorcycle.

## 2021-10-22 PROCEDURE — G0008 ADMIN INFLUENZA VIRUS VAC: HCPCS | Performed by: FAMILY MEDICINE

## 2021-10-22 PROCEDURE — 90694 VACC AIIV4 NO PRSRV 0.5ML IM: CPT | Performed by: FAMILY MEDICINE

## 2021-10-27 ENCOUNTER — HOSPITAL ENCOUNTER (OUTPATIENT)
Dept: MAMMOGRAPHY | Age: 71
Discharge: HOME | End: 2021-10-27
Payer: MEDICARE

## 2021-10-27 DIAGNOSIS — Z12.31 VISIT FOR SCREENING MAMMOGRAM: ICD-10-CM

## 2021-10-27 PROCEDURE — 77063 BREAST TOMOSYNTHESIS BI: CPT

## 2021-11-23 ENCOUNTER — OFFICE VISIT (OUTPATIENT)
Dept: FAMILY MEDICINE CLINIC | Age: 71
End: 2021-11-23
Payer: MEDICARE

## 2021-11-23 VITALS
RESPIRATION RATE: 16 BRPM | DIASTOLIC BLOOD PRESSURE: 98 MMHG | SYSTOLIC BLOOD PRESSURE: 160 MMHG | BODY MASS INDEX: 41.77 KG/M2 | OXYGEN SATURATION: 98 % | HEIGHT: 62 IN | HEART RATE: 92 BPM | TEMPERATURE: 96.7 F | WEIGHT: 227 LBS

## 2021-11-23 DIAGNOSIS — I10 HYPERTENSION, UNSPECIFIED TYPE: ICD-10-CM

## 2021-11-23 DIAGNOSIS — Z13.220 SCREENING FOR HYPERCHOLESTEROLEMIA: Primary | ICD-10-CM

## 2021-11-23 DIAGNOSIS — Z13.1 SCREENING FOR DIABETES MELLITUS: ICD-10-CM

## 2021-11-23 PROCEDURE — 4004F PT TOBACCO SCREEN RCVD TLK: CPT | Performed by: NURSE PRACTITIONER

## 2021-11-23 PROCEDURE — 1090F PRES/ABSN URINE INCON ASSESS: CPT | Performed by: NURSE PRACTITIONER

## 2021-11-23 PROCEDURE — 99214 OFFICE O/P EST MOD 30 MIN: CPT | Performed by: NURSE PRACTITIONER

## 2021-11-23 PROCEDURE — G8417 CALC BMI ABV UP PARAM F/U: HCPCS | Performed by: NURSE PRACTITIONER

## 2021-11-23 PROCEDURE — 4040F PNEUMOC VAC/ADMIN/RCVD: CPT | Performed by: NURSE PRACTITIONER

## 2021-11-23 PROCEDURE — 3017F COLORECTAL CA SCREEN DOC REV: CPT | Performed by: NURSE PRACTITIONER

## 2021-11-23 PROCEDURE — G8484 FLU IMMUNIZE NO ADMIN: HCPCS | Performed by: NURSE PRACTITIONER

## 2021-11-23 PROCEDURE — G8427 DOCREV CUR MEDS BY ELIG CLIN: HCPCS | Performed by: NURSE PRACTITIONER

## 2021-11-23 PROCEDURE — 1123F ACP DISCUSS/DSCN MKR DOCD: CPT | Performed by: NURSE PRACTITIONER

## 2021-11-23 PROCEDURE — G8400 PT W/DXA NO RESULTS DOC: HCPCS | Performed by: NURSE PRACTITIONER

## 2021-11-23 RX ORDER — AMLODIPINE BESYLATE 5 MG/1
5 TABLET ORAL DAILY
Qty: 30 TABLET | Refills: 3 | Status: SHIPPED | OUTPATIENT
Start: 2021-11-23 | End: 2021-12-01

## 2021-11-23 NOTE — PATIENT INSTRUCTIONS
Patient Education        DASH Diet: Care Instructions  Your Care Instructions     The DASH diet is an eating plan that can help lower your blood pressure. DASH stands for Dietary Approaches to Stop Hypertension. Hypertension is high blood pressure. The DASH diet focuses on eating foods that are high in calcium, potassium, and magnesium. These nutrients can lower blood pressure. The foods that are highest in these nutrients are fruits, vegetables, low-fat dairy products, nuts, seeds, and legumes. But taking calcium, potassium, and magnesium supplements instead of eating foods that are high in those nutrients does not have the same effect. The DASH diet also includes whole grains, fish, and poultry. The DASH diet is one of several lifestyle changes your doctor may recommend to lower your high blood pressure. Your doctor may also want you to decrease the amount of sodium in your diet. Lowering sodium while following the DASH diet can lower blood pressure even further than just the DASH diet alone. Follow-up care is a key part of your treatment and safety. Be sure to make and go to all appointments, and call your doctor if you are having problems. It's also a good idea to know your test results and keep a list of the medicines you take. How can you care for yourself at home? Following the DASH diet  · Eat 4 to 5 servings of fruit each day. A serving is 1 medium-sized piece of fruit, ½ cup chopped or canned fruit, 1/4 cup dried fruit, or 4 ounces (½ cup) of fruit juice. Choose fruit more often than fruit juice. · Eat 4 to 5 servings of vegetables each day. A serving is 1 cup of lettuce or raw leafy vegetables, ½ cup of chopped or cooked vegetables, or 4 ounces (½ cup) of vegetable juice. Choose vegetables more often than vegetable juice. · Get 2 to 3 servings of low-fat and fat-free dairy each day. A serving is 8 ounces of milk, 1 cup of yogurt, or 1 ½ ounces of cheese. · Eat 6 to 8 servings of grains each day. A serving is 1 slice of bread, 1 ounce of dry cereal, or ½ cup of cooked rice, pasta, or cooked cereal. Try to choose whole-grain products as much as possible. · Limit lean meat, poultry, and fish to 2 servings each day. A serving is 3 ounces, about the size of a deck of cards. · Eat 4 to 5 servings of nuts, seeds, and legumes (cooked dried beans, lentils, and split peas) each week. A serving is 1/3 cup of nuts, 2 tablespoons of seeds, or ½ cup of cooked beans or peas. · Limit fats and oils to 2 to 3 servings each day. A serving is 1 teaspoon of vegetable oil or 2 tablespoons of salad dressing. · Limit sweets and added sugars to 5 servings or less a week. A serving is 1 tablespoon jelly or jam, ½ cup sorbet, or 1 cup of lemonade. · Eat less than 2,300 milligrams (mg) of sodium a day. If you limit your sodium to 1,500 mg a day, you can lower your blood pressure even more. · Be aware that all of these are the suggested number of servings for people who eat 1,800 to 2,000 calories a day. Your recommended number of servings may be different if you need more or fewer calories. Tips for success  · Start small. Do not try to make dramatic changes to your diet all at once. You might feel that you are missing out on your favorite foods and then be more likely to not follow the plan. Make small changes, and stick with them. Once those changes become habit, add a few more changes. · Try some of the following:  ? Make it a goal to eat a fruit or vegetable at every meal and at snacks. This will make it easy to get the recommended amount of fruits and vegetables each day. ? Try yogurt topped with fruit and nuts for a snack or healthy dessert. ? Add lettuce, tomato, cucumber, and onion to sandwiches. ? Combine a ready-made pizza crust with low-fat mozzarella cheese and lots of vegetable toppings. Try using tomatoes, squash, spinach, broccoli, carrots, cauliflower, and onions. ?  Have a variety of cut-up vegetables with a low-fat dip as an appetizer instead of chips and dip. ? Sprinkle sunflower seeds or chopped almonds over salads. Or try adding chopped walnuts or almonds to cooked vegetables. ? Try some vegetarian meals using beans and peas. Add garbanzo or kidney beans to salads. Make burritos and tacos with mashed mays beans or black beans. Where can you learn more? Go to https://[employer]. org and sign in to your [employer] account. Enter D731 in the Coub box to learn more about \"DASH Diet: Care Instructions. \"     If you do not have an account, please click on the \"Sign Up Now\" link. Current as of: April 29, 2021               Content Version: 13.0  © 8149-0528 [employer]. Care instructions adapted under license by [facility] ([facility]). If you have questions about a medical condition or this instruction, always ask your healthcare professional. [address] any warranty or liability for your use of this information.

## 2021-11-23 NOTE — PROGRESS NOTES
[address] MEDICINE [address]  [address] [place] [address]  Dept: [phone]  Loc: [phone]  Visit Date: 11/23/2021      Chief Complaint:   Azul Mazariegos is a 80 y.o. female thatpresents for Other (skin tag on left eyelid)      HPI:  Left eye lesion  No pain or itching  Noticed it over the past few months      The patient comes in alone today. History obtained from pt and medical records. I have reviewed the patient's past medical history, past surgical history, allergies,medications, social and family history and I have made updates where appropriate.     Past Medical History:   Diagnosis Date    Cancer ([facility] [address]     skin cancer    Grief reaction     Hx of blood clots     right lower leg       Past Surgical History:   Procedure Laterality Date    CARPAL TUNNEL RELEASE Left     left    FOOT SURGERY Right     FOOT SURGERY Left     HYSTERECTOMY  1974    AUB    JOINT REPLACEMENT Left     hip    JOINT REPLACEMENT Right     knee    KNEE SURGERY Right     LEG BIOPSY EXCISION Left 12/16/2020    EXCISION SCC LEFT LOWER LEG \"C\" AND LIPOMA LEFT ANKLE WITH FROZEN SECTION performed by Evita Colón MD at [address]    ROTATOR CUFF REPAIR Right 2009    right    SINUS SURGERY         Social History     Tobacco Use    Smoking status: Current Every Day Smoker     Packs/day: 0.50     Years: 30.00     Pack years: 15.00     Types: Cigarettes    Smokeless tobacco: Never Used   Vaping Use    Vaping Use: Never used   Substance Use Topics    Alcohol use: No    Drug use: No       Family History   Problem Relation Age of Onset    Breast Cancer Mother 67         PHYSICAL EXAM:  BP (!) 160/98 (Site: Right Upper Arm, Position: Sitting)   Pulse 92   Temp 96.7 °F (35.9 °C) (Infrared)   Resp 16   Ht 5' 2\" (1.575 m)   Wt 227 lb (103 kg)   SpO2 98%   BMI 41.52 kg/m²     Physical Exam  Constitutional:       Appearance: Normal appearance. HENT:      Head: Normocephalic and atraumatic. Right Ear: External ear normal.      Left Ear: External ear normal.      Nose: Nose normal.      Mouth/Throat:      Mouth: Mucous membranes are moist.   Eyes:      Conjunctiva/sclera: Conjunctivae normal.   Cardiovascular:      Rate and Rhythm: Normal rate and regular rhythm. Pulses: Normal pulses. Heart sounds: Normal heart sounds. Pulmonary:      Effort: Pulmonary effort is normal.      Breath sounds: Normal breath sounds. Abdominal:      General: Bowel sounds are normal.      Palpations: Abdomen is soft. Musculoskeletal:         General: Normal range of motion. Cervical back: Normal range of motion. Skin:     General: Skin is warm and dry. Comments: Left eyelid with yellow plaques     Neurological:      General: No focal deficit present. Mental Status: She is alert and oriented to person, place, and time. Psychiatric:         Mood and Affect: Mood normal.         Behavior: Behavior normal.             ASSESSMENT & PLAN  Milton Burkett was seen today for other. Diagnoses and all orders for this visit:    Screening for hypercholesterolemia  -     Lipid Panel; Future  -     Hemoglobin A1C; Future    Screening for diabetes mellitus  -     Lipid Panel; Future  -     Hemoglobin A1C; Future    Hypertension, unspecified type  -     amLODIPine (NORVASC) 5 MG tablet; Take 1 tablet by mouth daily    BP elevated today  Has been trending up  Get labs   Follow up in 1 week    No follow-ups on file. Agueda received counseling on the following healthy behaviors: nutrition, exercise and medication adherence  Reviewedprior labs and health maintenance. Continue current medications, diet and exercise. Discussed use, benefit, and side effects of prescribed medications. Barriers to medication compliance addressed. Patient given educationalmaterials - see patient instructions. All patient questions answered.   Patient voiced understanding.      Electronically signed by ROVERTO Perry - CNP, APRN on 11/23/21 at 1:40 PM EST

## 2021-11-24 ENCOUNTER — HOSPITAL ENCOUNTER (OUTPATIENT)
Age: 71
Discharge: HOME | End: 2021-11-24
Payer: MEDICARE

## 2021-11-24 DIAGNOSIS — Z13.220 SCREENING FOR HYPERCHOLESTEROLEMIA: ICD-10-CM

## 2021-11-24 DIAGNOSIS — Z13.1 SCREENING FOR DIABETES MELLITUS: ICD-10-CM

## 2021-11-24 LAB
AVERAGE GLUCOSE: 117 MG/DL (ref 70–126)
CHOLESTEROL, TOTAL: 297 MG/DL (ref 100–199)
HBA1C MFR BLD: 5.9 % (ref 4.4–6.4)
HDLC SERPL-MCNC: 58 MG/DL
LDL CHOLESTEROL CALCULATED: 203 MG/DL
TRIGL SERPL-MCNC: 182 MG/DL (ref 0–199)

## 2021-11-24 PROCEDURE — 83036 HEMOGLOBIN GLYCOSYLATED A1C: CPT

## 2021-11-24 PROCEDURE — 36415 COLL VENOUS BLD VENIPUNCTURE: CPT

## 2021-11-24 PROCEDURE — 80061 LIPID PANEL: CPT

## 2021-12-01 ENCOUNTER — NURSE ONLY (OUTPATIENT)
Dept: FAMILY MEDICINE CLINIC | Age: 71
End: 2021-12-01
Payer: MEDICARE

## 2021-12-01 VITALS
RESPIRATION RATE: 17 BRPM | SYSTOLIC BLOOD PRESSURE: 172 MMHG | TEMPERATURE: 97.2 F | OXYGEN SATURATION: 97 % | HEIGHT: 62 IN | BODY MASS INDEX: 41.3 KG/M2 | DIASTOLIC BLOOD PRESSURE: 98 MMHG | HEART RATE: 75 BPM | WEIGHT: 224.4 LBS

## 2021-12-01 DIAGNOSIS — I10 HYPERTENSION, UNSPECIFIED TYPE: ICD-10-CM

## 2021-12-01 DIAGNOSIS — E78.00 HYPERCHOLESTEROLEMIA: Primary | ICD-10-CM

## 2021-12-01 PROCEDURE — 99214 OFFICE O/P EST MOD 30 MIN: CPT | Performed by: NURSE PRACTITIONER

## 2021-12-01 RX ORDER — AMLODIPINE BESYLATE 10 MG/1
10 TABLET ORAL DAILY
Qty: 30 TABLET | Refills: 3 | Status: SHIPPED | OUTPATIENT
Start: 2021-12-01 | End: 2022-04-18 | Stop reason: SDUPTHER

## 2021-12-01 NOTE — PATIENT INSTRUCTIONS
Start Red Yeast Rice supplement  Start Fish Oil supplement  Follow low fat, low sodium diet  Start walking 30 min a day at least 3 days a week  Drink plenty of water  Eat a serving of oatmeal daily  Increase Amlodipine to 10 mg daily    Patient Education        red yeast rice  Pronunciation:  RED YEAST RICE  Brand:  Red Yeast Rice  What is the most important information I should know about red yeast rice? Follow all directions on the product label and package. Tell each of your healthcare providers about all your medical conditions, allergies, and all medicines you use. What is red yeast rice? Red yeast rice is a product that is made by fermenting red rice with a certain type of yeast. Red yeast rice is also known as Cholestin, Hypocol, Xuezhikang, or Zhitai. Red yeast rice supplements are not the same as red yeast rice that is sold in [place]. Red yeast rice has been used in alternative medicine as a likely effective aid in reducing levels of \"bad\" cholesterol (low-density lipoprotein, or LDL) and triglycerides in the blood. Red yeast rice has been used as a possibly effective aid in reducing the risk of heart disease, heart attacks, or death in people with a history of heart attack. Red yeast rice has also been used as a possibly effective aid in lowering cholesterol and triglyceride levels in people with human immunodeficiency virus (HIV). Red yeast rice has been used to treat high blood pressure. However, research has shown that red yeast rice may not be effective in treating this condition. Other uses not proven with research have included: lowering cholesterol and blood sugar levels in people with diabetes, improving blood circulation, lowering liver enzymes associated with liver damage, and treating diarrhea, indigestion, or other stomach problems. It is not certain whether red yeast rice is effective in treating any medical condition.  Medicinal use of this product has not been approved by the FDA. Red yeast rice should not be used in place of medication prescribed for you by your doctor. Red yeast rice is often sold as an herbal supplement. There are no regulated manufacturing standards in place for many herbal compounds and some marketed supplements have been found to be contaminated with toxic metals or other drugs. Herbal/health supplements should be purchased from a reliable source to minimize the risk of contamination. Red yeast rice may also be used for purposes not listed in this product guide. What should I discuss with my healthcare provider before taking red yeast rice? You should not use red yeast rice if you are allergic to it, or:  · if you already take a cholesterol-lowering medication, such as lovastatin, simvastatin, atorvastatin, rosuvastatin, Zocor, Lipitor, Pravachol, Crestor, Vytorin, and many others. Talk with your healthcare provider before using red yeast rice to lower your cholesterol. You should not use red yeast rice in place of any medication that has been prescribed by your doctor. Ask a doctor, pharmacist, or other healthcare provider if it is safe for you to use this product if you have ever had:  · liver disease; or  · kidney disease. Red yeast rice has caused birth defects in animals and is considered likely unsafe to use during pregnancy. Do not use this product without medical advice if you are pregnant. It is not known whether red yeast rice passes into breast milk or if it could harm a nursing baby. Do not use this product without medical advice if you are breast-feeding a baby. Do not give any herbal/health supplement to a child without medical advice. How should I take red yeast rice? Red yeast rice contains an active ingredient called monacolin K, which has the same chemical structure as the active drug in the \"statin\" cholesterol medicine lovastatin (Advicor, Altoprev, Mevacor).  For this reason, red yeast rice supplements may produce the same side effects and drug interactions as statin cholesterol medications produce. The [address] and Drug Administration (FDA) considers red yeast products that contain more than trace amounts of this statin-identical ingredient to be illegal and unapproved drugs. Red yeast rice products sold within the [place] are formulated not to contain high levels of monacolin K. However, red yeast rice with higher levels of monacolin K are still available in other countries. When considering the use of herbal supplements, seek the advice of your doctor. You may also consider consulting a practitioner who is trained in the use of herbal/health supplements. If you choose to use red yeast rice, use it as directed on the package or as directed by your doctor, pharmacist, or other healthcare provider. Do not use more of this product than is recommended on the label. Red yeast rice works best if you take it with food. Do not inhale the powder from a red yeast rice capsule. It may cause an allergic reaction. Call your doctor if the condition you are treating with red yeast rice does not improve, or if it gets worse while using this product. Store at room temperature away from moisture and heat. What happens if I miss a dose? Skip the missed dose and use your next dose at the regular time. Do not use two doses at one time. What happens if I overdose? Seek emergency medical attention or call the Poison Help line at 1-428.147.7863. What should I avoid while taking red yeast rice? Avoid drinking alcohol. It may increase your risk of liver damage while you are taking red yeast rice. Avoid using red yeast rice together with other herbal/health supplements that can harm your liver, such androstenedione, chaparral, comfrey, DHEA, germander, kava, niacin, pennyroyal oil, and others. Grapefruit may interact with red yeast rice and lead to unwanted side effects. Avoid the use of grapefruit products.   Avoid taking an herbal supplement containing Nolberto's wort at the same time you are taking red yeast rice. Avoid using red yeast rice together with another herbal/health supplement that contains coenzyme Q10 (CoQ10, ubiquinone). What are the possible side effects of red yeast rice? Get emergency medical help if you have signs of an allergic reaction: hives; difficulty breathing; swelling of your face, lips, tongue, or throat. Although not all side effects are known, red yeast rice is thought to be possibly safe when taken as directed for up to 4.5 years. Stop using red yeast rice and call your healthcare provider at once if you have:  · unexplained muscle pain, tenderness, or weakness;  · fever, unusual tiredness; or  · nausea, upper stomach pain, itching, tiredness, loss of appetite, dark urine, naseem-colored stools, jaundice (yellowing of the skin or eyes). Common side effects may include:  · constipation; or  · stomach discomfort. This is not a complete list of side effects and others may occur. Call your doctor for medical advice about side effects. You may report side effects to FDA at 5-852-FDA-2168. What other drugs will affect red yeast rice? Do not take red yeast rice without medical advice if you are using a medication to treat any of the following conditions:  · any type of infection (viral, bacterial, or fungal), including HIV or tuberculosis;  · arthritis, including gold injections;  · cancer;  · depression or a psychiatric disorder;  · high blood pressure, high cholesterol, or a heart condition;  · pain or headaches, especially if you take an NSAID (nonsteroidal anti-inflammatory drugs) aspirin, ibuprofen (Advil, Motrin), naproxen (Aleve), celecoxib, diclofenac, indomethacin, meloxicam, and others);  · prevention of organ transplant rejection; or  · seizures. This list is not complete.  Other drugs may affect red yeast rice, including prescription and over-the-counter medicines, vitamins, and herbal products. Not all possible drug interactions are listed here. Where can I get more information? Consult with a licensed healthcare professional before using any herbal/health supplement. Whether you are treated by a medical doctor or a practitioner trained in the use of natural medicines/supplements, make sure all your healthcare providers know about all of your medical conditions and treatments. Remember, keep this and all other medicines out of the reach of children, never share your medicines with others, and use this medication only for the indication prescribed. Every effort has been made to ensure that the information provided by [facility] is accurate, up-to-date, and complete, but no guarantee is made to that effect. Drug information contained herein may be time sensitive. [facility] information has been compiled for use by healthcare practitioners and consumers in the [place] and therefore [facility] does not warrant that uses outside of the [place] are appropriate, unless specifically indicated otherwise. [facility]'s drug information does not endorse drugs, diagnose patients or recommend therapy. [facility] drug information is an informational resource designed to assist licensed healthcare practitioners in caring for their patients and/or to serve consumers viewing this service as a supplement to, and not a substitute for, the expertise, skill, knowledge and judgment of healthcare practitioners. The absence of a warning for a given drug or drug combination in no way should be construed to indicate that the drug or drug combination is safe, effective or appropriate for any given patient. [facility] does not assume any responsibility for any aspect of healthcare administered with the aid of information [facility] provides. The information contained herein is not intended to cover all possible uses, directions, precautions, warnings, drug interactions, allergic reactions, or adverse effects. If you have questions about the drugs you are taking, check with your doctor, nurse or pharmacist.  Copyright 6254-0462 [address]. Version: 8.01. Revision date: 10/21/2019. Care instructions adapted under license by [facility] ([facility]). If you have questions about a medical condition or this instruction, always ask your healthcare professional. [place] [address] any warranty or liability for your use of this information.

## 2021-12-01 NOTE — PROGRESS NOTES
Curly Aguilar is a 80 y.o. female that presents for Other (still having high blood pressures even after starting new medication)      HTN  BP still elevated  Taking Amlodipine  Recent labwork revealed elevated cholesterol  Denies chest pain, SOB, dizziness, lightheadedness  Has HA when pressure is high    Body mass index is 41.04 kg/m². BP Readings from Last 3 Encounters:   12/01/21 (!) 172/98   11/23/21 (!) 160/98   10/21/21 (!) 160/100       Review of Systems  Positive responses are highlighted in bold    Constitutional:  Fever, Chills, Fatigue, Unexpected changes in weight  Eyes:  Eye discharge, Eye pain, Eye redness, Visual disturbances   HENT:  Ear pain, Tinnitus, Nosebleeds, Trouble swallowing  Cardiovascular:  Chest Pain, Palpitations  Respiratory:  Cough, Wheezing, Shortness of breath, Chest tightness, Apnea  Gastrointestinal:  Nausea, Vomiting, Diarrhea, Constipation, Heartburn, Blood in stool  Genitourinary:  Difficulty or painful urination, Flank pain, Change in frequency, Urgency  Skin:  Color change, Rash, Itching, Wound  Psychiatric:  Hallucinations, Anxiety, Depression, Suicidal ideation  Hematological:  Enlarged glands, Easy bleeding, Easily bruising  Musculoskeletal:  Joint pain, Back pain, Gait problems, Joint swelling, Myalgias  Neurological:  Dizziness, Headaches, Presyncope, Numbness, Seizures, Tremors  Allergy:  Environmental allergies, Food allergies  Endocrine:  Heat Intolerance, Cold Intolerance, Polydipsia, Polyphagia, Polyuria      Physical Exam    BP (!) 172/98   Pulse 75   Temp 97.2 °F (36.2 °C) (Infrared)   Resp 17   Ht 5' 2\" (1.575 m)   Wt 224 lb 6.4 oz (101.8 kg)   SpO2 97%   BMI 41.04 kg/m²   Appearance: alert, well appearing, and in no distress, normal appearing weight and well hydrated. Eye exam - pupils equal and reactive, extraocular eye movements intact, no exudates or abnormalities noted on fundoscopic exam  Neck exam - supple, no significant adenopathy.   CVS exam: normal rate, regular rhythm, normal S1, S2, no murmurs, rubs, clicks or gallops. Lungs: clear to auscultation, no wheezes, rales or rhonchi, symmetric air entry. Extremities:  No pedal edema, clubbing or cyanosis  Mental Status: normal mood, affect behavior, speech, dress,  and thought processes. Neuro:  Alert, muscle tone grossly normal  Skin exam:  normal coloration and turgor, no rashes, no suspicious skin lesions noted. ASSESSMENT & PLAN  Italo Li was seen today for other. Diagnoses and all orders for this visit:    Hypercholesterolemia  -     Lipid Panel; Future    Hypertension, unspecified type  -     amLODIPine (NORVASC) 10 MG tablet; Take 1 tablet by mouth daily    Increase meds  Continue to monitor BP  Start Red Yeast Rice  Follow low fat, low salt diet  Drink plenty of water  Walk 3 days a week at least 30 min a day  Recheck lipids in coming months  Follow up in 1 week for recheck      No follow-ups on file.

## 2021-12-08 ENCOUNTER — OFFICE VISIT (OUTPATIENT)
Dept: FAMILY MEDICINE CLINIC | Age: 71
End: 2021-12-08
Payer: MEDICARE

## 2021-12-08 VITALS — DIASTOLIC BLOOD PRESSURE: 88 MMHG | WEIGHT: 219 LBS | BODY MASS INDEX: 40.06 KG/M2 | SYSTOLIC BLOOD PRESSURE: 153 MMHG

## 2021-12-08 DIAGNOSIS — I10 HYPERTENSION, UNSPECIFIED TYPE: Primary | ICD-10-CM

## 2021-12-08 PROCEDURE — G8427 DOCREV CUR MEDS BY ELIG CLIN: HCPCS | Performed by: FAMILY MEDICINE

## 2021-12-08 PROCEDURE — 4040F PNEUMOC VAC/ADMIN/RCVD: CPT | Performed by: FAMILY MEDICINE

## 2021-12-08 PROCEDURE — G8417 CALC BMI ABV UP PARAM F/U: HCPCS | Performed by: FAMILY MEDICINE

## 2021-12-08 PROCEDURE — 1123F ACP DISCUSS/DSCN MKR DOCD: CPT | Performed by: FAMILY MEDICINE

## 2021-12-08 PROCEDURE — G8484 FLU IMMUNIZE NO ADMIN: HCPCS | Performed by: FAMILY MEDICINE

## 2021-12-08 PROCEDURE — G8400 PT W/DXA NO RESULTS DOC: HCPCS | Performed by: FAMILY MEDICINE

## 2021-12-08 PROCEDURE — 1090F PRES/ABSN URINE INCON ASSESS: CPT | Performed by: FAMILY MEDICINE

## 2021-12-08 PROCEDURE — 99213 OFFICE O/P EST LOW 20 MIN: CPT | Performed by: FAMILY MEDICINE

## 2021-12-08 PROCEDURE — 4004F PT TOBACCO SCREEN RCVD TLK: CPT | Performed by: FAMILY MEDICINE

## 2021-12-08 PROCEDURE — 3017F COLORECTAL CA SCREEN DOC REV: CPT | Performed by: FAMILY MEDICINE

## 2021-12-08 NOTE — PROGRESS NOTES
Gila Reddy is a 80 y.o. female that presents for     Chief Complaint   Patient presents with    Hypertension       BP (!) 153/88   Wt 219 lb (99.3 kg)   BMI 40.06 kg/m²       HPI    HTN    Does patient check BP regularly at home? - Yes - readings reviewed today, SBPs in the 150s in the AM  Current Medication regimen - amlodipine  Tolerating medications well? - yes    Shortness of breath or chest pain? No  Headache or visual complaints? Yes,   Neurologic changes like confusion? No  Extremity edema?  No    BP Readings from Last 3 Encounters:   12/08/21 (!) 153/88   12/01/21 (!) 172/98   11/23/21 (!) 160/98         Health Maintenance   Topic Date Due    Hepatitis C screen  Never done    DTaP/Tdap/Td vaccine (1 - Tdap) 06/08/2015    COVID-19 Vaccine (3 - Booster for [place] series) 09/04/2021    Annual Wellness Visit (AWV)  10/22/2022    Breast cancer screen  10/27/2022    A1C test (Diabetic or Prediabetic)  11/24/2022    Lipid screen  11/24/2026    Colon cancer screen colonoscopy  03/04/2031    DEXA (modify frequency per FRAX score)  Completed    Flu vaccine  Completed    Shingles Vaccine  Completed    Pneumococcal 65+ years Vaccine  Completed    Hepatitis A vaccine  Aged Out    Hepatitis B vaccine  Aged Out    Hib vaccine  Aged Out    Meningococcal (ACWY) vaccine  Aged Out       Past Medical History:   Diagnosis Date    Cancer ([address]     skin cancer    Grief reaction     Hx of blood clots     right lower leg       Past Surgical History:   Procedure Laterality Date    CARPAL TUNNEL RELEASE Left     left    FOOT SURGERY Right     FOOT SURGERY Left     HYSTERECTOMY  1974    AUB    JOINT REPLACEMENT Left     hip    JOINT REPLACEMENT Right     knee    KNEE SURGERY Right     LEG BIOPSY EXCISION Left 12/16/2020    EXCISION SCC LEFT LOWER LEG \"C\" AND LIPOMA LEFT ANKLE WITH FROZEN SECTION performed by Florence Rasmussen MD at [place] [address] Right 2009    right    SINUS SURGERY         Social History     Tobacco Use    Smoking status: Current Every Day Smoker     Packs/day: 0.50     Years: 30.00     Pack years: 15.00     Types: Cigarettes    Smokeless tobacco: Never Used   Vaping Use    Vaping Use: Never used   Substance Use Topics    Alcohol use: No    Drug use: No       Family History   Problem Relation Age of Onset    Breast Cancer Mother 67         I have reviewed the patient's past medical history, past surgical history, allergies, medications, social and family history and I have made updates where appropriate. Review of Systems        PHYSICAL EXAM:  BP (!) 153/88   Wt 219 lb (99.3 kg)   BMI 40.06 kg/m²     Physical Exam  Vitals and nursing note reviewed. Constitutional:       General: She is not in acute distress. Appearance: She is well-developed. HENT:      Head: Normocephalic and atraumatic. Right Ear: Hearing and external ear normal.      Left Ear: Hearing and external ear normal.      Nose: Nose normal.   Eyes:      General:         Right eye: No discharge. Left eye: No discharge. Conjunctiva/sclera: Conjunctivae normal.   Neck:      Thyroid: No thyromegaly. Trachea: No tracheal deviation. Cardiovascular:      Rate and Rhythm: Normal rate and regular rhythm. Heart sounds: Normal heart sounds. No murmur heard. No friction rub. No gallop. Pulmonary:      Effort: Pulmonary effort is normal. No respiratory distress. Breath sounds: No wheezing or rales. Chest:      Chest wall: No tenderness. Musculoskeletal:         General: No deformity. Cervical back: Normal range of motion and neck supple. Lymphadenopathy:      Cervical: No cervical adenopathy. Skin:     General: Skin is warm and dry. Findings: No erythema or rash. Neurological:      Mental Status: She is alert. Motor: No abnormal muscle tone.       Coordination: Coordination normal.   Psychiatric: Behavior: Behavior normal.         Thought Content: Thought content normal.         Judgment: Judgment normal.             ASSESSMENT & PLAN  Agueda was seen today for hypertension. Diagnoses and all orders for this visit:    Hypertension, unspecified type    -BP readings improving, but still uncontrolled, mostly in the AM.  She is going to try changing the norvasc to 5mg BID and drop off her readings in 1 week. If still uncontrolled, could add HCTZ to regimen. Return in about 1 week (around 12/15/2021). The most recent results of the following tests were reviewed with the patient today: none     All copied or forwarded information in the progress note was verified by me to be accurate at the time of visit  Patient's past medical, surgical, social and family history were reviewed and updated     All patient questions answered. Patient voiced understanding.

## 2022-01-12 ENCOUNTER — OFFICE VISIT (OUTPATIENT)
Dept: FAMILY MEDICINE CLINIC | Age: 72
End: 2022-01-12
Payer: MEDICARE

## 2022-01-12 DIAGNOSIS — I10 HYPERTENSION, UNSPECIFIED TYPE: Primary | ICD-10-CM

## 2022-01-12 PROCEDURE — 1123F ACP DISCUSS/DSCN MKR DOCD: CPT | Performed by: NURSE PRACTITIONER

## 2022-01-12 PROCEDURE — G8484 FLU IMMUNIZE NO ADMIN: HCPCS | Performed by: NURSE PRACTITIONER

## 2022-01-12 PROCEDURE — 4040F PNEUMOC VAC/ADMIN/RCVD: CPT | Performed by: NURSE PRACTITIONER

## 2022-01-12 PROCEDURE — 1090F PRES/ABSN URINE INCON ASSESS: CPT | Performed by: NURSE PRACTITIONER

## 2022-01-12 PROCEDURE — 99214 OFFICE O/P EST MOD 30 MIN: CPT | Performed by: NURSE PRACTITIONER

## 2022-01-12 PROCEDURE — G8400 PT W/DXA NO RESULTS DOC: HCPCS | Performed by: NURSE PRACTITIONER

## 2022-01-12 PROCEDURE — 3017F COLORECTAL CA SCREEN DOC REV: CPT | Performed by: NURSE PRACTITIONER

## 2022-01-12 PROCEDURE — G8428 CUR MEDS NOT DOCUMENT: HCPCS | Performed by: NURSE PRACTITIONER

## 2022-01-12 PROCEDURE — 4004F PT TOBACCO SCREEN RCVD TLK: CPT | Performed by: NURSE PRACTITIONER

## 2022-01-12 PROCEDURE — G8417 CALC BMI ABV UP PARAM F/U: HCPCS | Performed by: NURSE PRACTITIONER

## 2022-01-12 NOTE — PROGRESS NOTES
[address] MEDICINE [address]  [address] [place] [address]  Dept: [phone]  Loc: [phone]  Visit Date: 1/12/2022      Chief Complaint:   Julio Cesar Hidalgo is a 80 y.o. female thatpresents for No chief complaint on file. HPI:    Pt in with  today  -170/70-95  Continues on Norvasc 5 mg bid  Denies chest pain, SOB, dizziness, lightheadedness, edema      The patient comes in with her  today. History obtained from pt and medical records. I have reviewed the patient's past medical history, past surgical history, allergies,medications, social and family history and I have made updates where appropriate. Past Medical History:   Diagnosis Date    Cancer ([address]     skin cancer    Grief reaction     Hx of blood clots     right lower leg       Past Surgical History:   Procedure Laterality Date    CARPAL TUNNEL RELEASE Left     left    FOOT SURGERY Right     FOOT SURGERY Left     HYSTERECTOMY  1974    AUB    JOINT REPLACEMENT Left     hip    JOINT REPLACEMENT Right     knee    KNEE SURGERY Right     LEG BIOPSY EXCISION Left 12/16/2020    EXCISION SCC LEFT LOWER LEG \"C\" AND LIPOMA LEFT ANKLE WITH FROZEN SECTION performed by Brittny Fry MD at [address]    ROTATOR CUFF REPAIR Right 2009    right    SINUS SURGERY         Social History     Tobacco Use    Smoking status: Current Every Day Smoker     Packs/day: 0.50     Years: 30.00     Pack years: 15.00     Types: Cigarettes    Smokeless tobacco: Never Used   Vaping Use    Vaping Use: Never used   Substance Use Topics    Alcohol use: No    Drug use: No       Family History   Problem Relation Age of Onset    Breast Cancer Mother 67         PHYSICAL EXAM:  There were no vitals taken for this visit. Physical Exam  Constitutional:       Appearance: Normal appearance. HENT:      Head: Normocephalic and atraumatic.       Right Ear: External ear normal.      Left Ear: External ear normal.      Nose: Nose normal.   Eyes:      Conjunctiva/sclera: Conjunctivae normal.   Pulmonary:      Effort: Pulmonary effort is normal.   Musculoskeletal:         General: Normal range of motion. Cervical back: Normal range of motion. Neurological:      General: No focal deficit present. Mental Status: She is alert and oriented to person, place, and time. Psychiatric:         Mood and Affect: Mood normal.         Behavior: Behavior normal.             ASSESSMENT & PLAN  Diagnoses and all orders for this visit:    Hypertension, unspecified type        Will add 12.5 mg HCTZ  RTO in 1 week for recheck   Bring BP log with her  No follow-ups on file. Agueda received counseling on the following healthy behaviors: nutrition, exercise and medication adherence  Reviewedprior labs and health maintenance. Continue current medications, diet and exercise. Discussed use, benefit, and side effects of prescribed medications. Barriers to medication compliance addressed. Patient given educationalmaterials - see patient instructions. All patient questions answered. Patient voiced understanding.      Electronically signed by ROVERTO Rajput - CNP, APRN on 1/12/22 at 12:31 PM EST

## 2022-01-19 ENCOUNTER — OFFICE VISIT (OUTPATIENT)
Dept: FAMILY MEDICINE CLINIC | Age: 72
End: 2022-01-19
Payer: MEDICARE

## 2022-01-19 VITALS
RESPIRATION RATE: 16 BRPM | OXYGEN SATURATION: 96 % | TEMPERATURE: 97 F | WEIGHT: 221.6 LBS | HEART RATE: 70 BPM | BODY MASS INDEX: 40.78 KG/M2 | SYSTOLIC BLOOD PRESSURE: 138 MMHG | DIASTOLIC BLOOD PRESSURE: 86 MMHG | HEIGHT: 62 IN

## 2022-01-19 DIAGNOSIS — I10 HYPERTENSION, UNSPECIFIED TYPE: Primary | ICD-10-CM

## 2022-01-19 PROCEDURE — 1123F ACP DISCUSS/DSCN MKR DOCD: CPT | Performed by: NURSE PRACTITIONER

## 2022-01-19 PROCEDURE — 3017F COLORECTAL CA SCREEN DOC REV: CPT | Performed by: NURSE PRACTITIONER

## 2022-01-19 PROCEDURE — G8484 FLU IMMUNIZE NO ADMIN: HCPCS | Performed by: NURSE PRACTITIONER

## 2022-01-19 PROCEDURE — 99214 OFFICE O/P EST MOD 30 MIN: CPT | Performed by: NURSE PRACTITIONER

## 2022-01-19 PROCEDURE — G8400 PT W/DXA NO RESULTS DOC: HCPCS | Performed by: NURSE PRACTITIONER

## 2022-01-19 PROCEDURE — 4040F PNEUMOC VAC/ADMIN/RCVD: CPT | Performed by: NURSE PRACTITIONER

## 2022-01-19 PROCEDURE — 4004F PT TOBACCO SCREEN RCVD TLK: CPT | Performed by: NURSE PRACTITIONER

## 2022-01-19 PROCEDURE — 1090F PRES/ABSN URINE INCON ASSESS: CPT | Performed by: NURSE PRACTITIONER

## 2022-01-19 PROCEDURE — G8417 CALC BMI ABV UP PARAM F/U: HCPCS | Performed by: NURSE PRACTITIONER

## 2022-01-19 PROCEDURE — G8427 DOCREV CUR MEDS BY ELIG CLIN: HCPCS | Performed by: NURSE PRACTITIONER

## 2022-01-19 NOTE — PATIENT INSTRUCTIONS
meals  · Cut back on the amount of salt you use in cooking. This will help you adjust to the taste. Do not add salt after cooking. One teaspoon of salt has about 2,300 mg of sodium. · Take the salt shaker off the table. · Flavor your food with garlic, lemon juice, onion, vinegar, herbs, and spices. Do not use soy sauce, lite soy sauce, steak sauce, onion salt, garlic salt, celery salt, or ketchup on your food. · Use low-sodium salad dressings, sauces, and ketchup. Or make your own salad dressings and sauces without adding salt. · Use less salt (or none) when recipes call for it. You can often use half the salt a recipe calls for without losing flavor. Other foods such as rice, pasta, and grains do not need added salt. · Rinse canned vegetables, and cook them in fresh water. This removes somebut not allof the salt. · Avoid water that is naturally high in sodium or that has been treated with water softeners, which add sodium. If you buy bottled water, read the label and choose a sodium-free brand. Avoid high-sodium foods  · Avoid eating:  ? Smoked, cured, salted, and canned meat, fish, and poultry. ? Ham, camacho, hot dogs, and luncheon meats. ? Regular, hard, and processed cheese and regular peanut butter. ? Crackers with salted tops, and other salted snack foods such as pretzels, chips, and salted popcorn. ? Frozen prepared meals, unless labeled low-sodium. ? Canned and dried soups, broths, and bouillon, unless labeled sodium-free or low-sodium. ? Canned vegetables, unless labeled sodium-free or low-sodium. ? [place] fries, pizza, tacos, and other fast foods. ? Pickles, olives, ketchup, and other condiments, especially soy sauce, unless labeled sodium-free or low-sodium. Where can you learn more? Go to https://xin.healthwoodpellets.com. org and sign in to your [employer] account. Enter J460 in the [facility] box to learn more about \"Low Sodium Diet (2,000 Milligram): Care Instructions. \" If you do not have an account, please click on the \"Sign Up Now\" link. Current as of: September 8, 2021               Content Version: 13.1  © 2006-2021 Healthwise, Incorporated. Care instructions adapted under license by [facility] ([facility]). If you have questions about a medical condition or this instruction, always ask your healthcare professional. [address] any warranty or liability for your use of this information.

## 2022-01-19 NOTE — PROGRESS NOTES
[facility] MEDICINE  [address]  [address] [place] [address]  Dept: [phone]  Loc: [phone]  Visit Date: 1/19/2022      Chief Complaint:   Campos Cuadra is a 80 y.o. female thatpresents for Hypertension    HPI:  BP running better  Currently on Amlodipine 10 mg and HCTZ 12.5mg  Denies any chest pain or shortness of breath    The patient comes in with her  today. History obtained from pt, , and medical records. I have reviewed the patient's past medical history, past surgical history, allergies,medications, social and family history and I have made updates where appropriate. Past Medical History:   Diagnosis Date    [address] [address]     skin cancer    Grief reaction     Hx of blood clots     right lower leg       Past Surgical History:   Procedure Laterality Date    CARPAL TUNNEL RELEASE Left     left    FOOT SURGERY Right     FOOT SURGERY Left     HYSTERECTOMY  1974    AUB    JOINT REPLACEMENT Left     hip    JOINT REPLACEMENT Right     knee    KNEE SURGERY Right     LEG BIOPSY EXCISION Left 12/16/2020    EXCISION SCC LEFT LOWER LEG \"C\" AND LIPOMA LEFT ANKLE WITH FROZEN SECTION performed by Grace Huffman MD at [address]    ROTATOR CUFF REPAIR Right 2009    right    SINUS SURGERY         Social History     Tobacco Use    Smoking status: Current Every Day Smoker     Packs/day: 0.50     Years: 30.00     Pack years: 15.00     Types: Cigarettes    Smokeless tobacco: Never Used   Vaping Use    Vaping Use: Never used   Substance Use Topics    Alcohol use: No    Drug use: No       Family History   Problem Relation Age of Onset    Breast Cancer Mother 67         PHYSICAL EXAM:  /86   Pulse 70   Temp 97 °F (36.1 °C) (Infrared)   Resp 16   Ht 5' 2\" (1.575 m)   Wt 221 lb 9.6 oz (100.5 kg)   SpO2 96%   BMI 40.53 kg/m²     Physical Exam  Constitutional:       Appearance: Normal appearance. HENT:      Head: Normocephalic and atraumatic. Right Ear: External ear normal.      Left Ear: External ear normal.      Nose: Nose normal.      Mouth/Throat:      Mouth: Mucous membranes are moist.   Eyes:      Conjunctiva/sclera: Conjunctivae normal.   Cardiovascular:      Rate and Rhythm: Normal rate and regular rhythm. Pulses: Normal pulses. Heart sounds: Normal heart sounds. Pulmonary:      Effort: Pulmonary effort is normal.      Breath sounds: Normal breath sounds. Abdominal:      General: Bowel sounds are normal.      Palpations: Abdomen is soft. Musculoskeletal:         General: Normal range of motion. Cervical back: Normal range of motion. Skin:     General: Skin is warm and dry. Neurological:      General: No focal deficit present. Mental Status: She is alert and oriented to person, place, and time. Psychiatric:         Mood and Affect: Mood normal.         Behavior: Behavior normal.             ASSESSMENT & PLAN  Agueda was seen today for hypertension. Diagnoses and all orders for this visit:    Hypertension, unspecified type    continue Amlodipine and HCTZ daily  3 mos followup    No follow-ups on file. Agueda received counseling on the following healthy behaviors: nutrition, exercise and medication adherence  Reviewedprior labs and health maintenance. Continue current medications, diet and exercise. Discussed use, benefit, and side effects of prescribed medications. Barriers to medication compliance addressed. Patient given educationalmaterials - see patient instructions. All patient questions answered. Patient voiced understanding.      Electronically signed by ROVERTO Barrera - CNP, APRN on 1/19/22 at 11:52 AM EST

## 2022-02-06 ENCOUNTER — PATIENT MESSAGE (OUTPATIENT)
Dept: FAMILY MEDICINE CLINIC | Age: 72
End: 2022-02-06

## 2022-02-06 DIAGNOSIS — R53.83 OTHER FATIGUE: ICD-10-CM

## 2022-02-06 DIAGNOSIS — F41.9 ANXIETY: ICD-10-CM

## 2022-02-07 RX ORDER — CITALOPRAM 20 MG/1
TABLET ORAL
Qty: 90 TABLET | Refills: 3 | Status: SHIPPED | OUTPATIENT
Start: 2022-02-07

## 2022-02-07 RX ORDER — HYDROCHLOROTHIAZIDE 12.5 MG/1
12.5 TABLET ORAL EVERY MORNING
Qty: 90 TABLET | Refills: 3 | Status: SHIPPED | OUTPATIENT
Start: 2022-02-07

## 2022-02-07 NOTE — TELEPHONE ENCOUNTER
From: Gaby Thompson  To: Mayte Doan  Sent: 2/6/2022 9:49 AM EST  Subject: Prescription refill     I need a refill for citalopram and hydrochlorothiazide. Please make for 90 days. Send to [facility] in [place].

## 2022-03-31 ENCOUNTER — OFFICE VISIT (OUTPATIENT)
Dept: FAMILY MEDICINE CLINIC | Age: 72
End: 2022-03-31
Payer: MEDICARE

## 2022-03-31 ENCOUNTER — HOSPITAL ENCOUNTER (OUTPATIENT)
Age: 72
Discharge: HOME | End: 2022-03-31
Payer: MEDICARE

## 2022-03-31 ENCOUNTER — HOSPITAL ENCOUNTER (OUTPATIENT)
Dept: GENERAL RADIOLOGY | Age: 72
Discharge: HOME | End: 2022-03-31
Payer: MEDICARE

## 2022-03-31 VITALS
BODY MASS INDEX: 41.37 KG/M2 | SYSTOLIC BLOOD PRESSURE: 124 MMHG | TEMPERATURE: 96.7 F | HEART RATE: 76 BPM | DIASTOLIC BLOOD PRESSURE: 80 MMHG | RESPIRATION RATE: 16 BRPM | OXYGEN SATURATION: 98 % | WEIGHT: 224.8 LBS | HEIGHT: 62 IN

## 2022-03-31 DIAGNOSIS — E78.5 HYPERLIPIDEMIA, UNSPECIFIED HYPERLIPIDEMIA TYPE: ICD-10-CM

## 2022-03-31 DIAGNOSIS — M54.2 NECK PAIN: ICD-10-CM

## 2022-03-31 DIAGNOSIS — S46.811A STRAIN OF RIGHT TRAPEZIUS MUSCLE, INITIAL ENCOUNTER: Primary | ICD-10-CM

## 2022-03-31 DIAGNOSIS — S46.811A STRAIN OF RIGHT TRAPEZIUS MUSCLE, INITIAL ENCOUNTER: ICD-10-CM

## 2022-03-31 DIAGNOSIS — E66.01 OBESITY, CLASS III, BMI 40-49.9 (MORBID OBESITY) (HCC): ICD-10-CM

## 2022-03-31 PROCEDURE — G8484 FLU IMMUNIZE NO ADMIN: HCPCS | Performed by: FAMILY MEDICINE

## 2022-03-31 PROCEDURE — 1090F PRES/ABSN URINE INCON ASSESS: CPT | Performed by: FAMILY MEDICINE

## 2022-03-31 PROCEDURE — G8417 CALC BMI ABV UP PARAM F/U: HCPCS | Performed by: FAMILY MEDICINE

## 2022-03-31 PROCEDURE — G8400 PT W/DXA NO RESULTS DOC: HCPCS | Performed by: FAMILY MEDICINE

## 2022-03-31 PROCEDURE — 3017F COLORECTAL CA SCREEN DOC REV: CPT | Performed by: FAMILY MEDICINE

## 2022-03-31 PROCEDURE — 4004F PT TOBACCO SCREEN RCVD TLK: CPT | Performed by: FAMILY MEDICINE

## 2022-03-31 PROCEDURE — 72040 X-RAY EXAM NECK SPINE 2-3 VW: CPT

## 2022-03-31 PROCEDURE — 4040F PNEUMOC VAC/ADMIN/RCVD: CPT | Performed by: FAMILY MEDICINE

## 2022-03-31 PROCEDURE — 1123F ACP DISCUSS/DSCN MKR DOCD: CPT | Performed by: FAMILY MEDICINE

## 2022-03-31 PROCEDURE — 99214 OFFICE O/P EST MOD 30 MIN: CPT | Performed by: FAMILY MEDICINE

## 2022-03-31 PROCEDURE — G8427 DOCREV CUR MEDS BY ELIG CLIN: HCPCS | Performed by: FAMILY MEDICINE

## 2022-03-31 RX ORDER — BACLOFEN 10 MG/1
10 TABLET ORAL 3 TIMES DAILY
Qty: 90 TABLET | Refills: 3 | Status: SHIPPED | OUTPATIENT
Start: 2022-03-31 | End: 2022-07-20

## 2022-03-31 RX ORDER — SIMVASTATIN 20 MG
20 TABLET ORAL NIGHTLY
Qty: 90 TABLET | Refills: 1 | Status: SHIPPED | OUTPATIENT
Start: 2022-03-31 | End: 2022-09-19

## 2022-03-31 NOTE — PROGRESS NOTES
SUBJECTIVE:  Bernarda Colorado is a 80 y.o. y/o female that presents with Neck Pain  . HPI:  Pain is present in the cervical region. Symptoms have been present for 6 month(s). The pain is constant. The patient describes the pain as sharp / stabbing. Inciting injury or history of trauma? No  Pain is aggravated by - head rotation  Pain is relieved by - nothing  Radiation of the pain? Yes - towards the right  Paresthesias of the extremities? No  Saddle anesthesia? No  Bowel or bladder incontinence? No  Treatments tried - chiropractor treatments, heat, topical analgesics and massage    Dyslipidemia    Current Medication regimen - none currently  Personal History of CAD? No   Hx of CAD in first degree relatives? No  Current smoker? No  History of HTN? Yes  History of DM? No    Shortness of breath or chest pain? No  Neurologic changes? No  Extremity edema? No    Lab Results   Component Value Date    LDLCALC 203 11/24/2021     BP Readings from Last 3 Encounters:   03/31/22 124/80   01/19/22 138/86   12/08/21 (!) 153/88     Wt Readings from Last 3 Encounters:   03/31/22 224 lb 12.8 oz (102 kg)   01/19/22 221 lb 9.6 oz (100.5 kg)   12/08/21 219 lb (99.3 kg)         OBJECTIVE:  /80   Pulse 76   Temp 96.7 °F (35.9 °C) (Infrared)   Resp 16   Ht 5' 2\" (1.575 m)   Wt 224 lb 12.8 oz (102 kg)   SpO2 98%   BMI 41.12 kg/m²   Physical Examination: General appearance - alert, well appearing, and in no distress  Chest - clear to auscultation, no wheezes, rales or rhonchi, symmetric air entry  Heart - normal rate, regular rhythm, normal S1, S2, no murmurs, rubs, clicks or gallops  Back exam - increase tension of the right trapezius and reduced ROM of the right neck,  5/5 strength globally and symmetrically in the LEs  Extremities - peripheral pulses normal, no pedal edema, no clubbing or cyanosis  Skin -  Skin color, texture, turgor normal. No rashes or lesions.   Psych - Affect normal, no evidence of depression or anxiety    ASSESSMENT & PLAN  Severa Kale was seen today for neck pain. Diagnoses and all orders for this visit:    Strain of right trapezius muscle, initial encounter  -     baclofen (LIORESAL) 10 MG tablet; Take 1 tablet by mouth 3 times daily  -     XR CERVICAL SPINE (2-3 VIEWS); Future    Neck pain  -     baclofen (LIORESAL) 10 MG tablet; Take 1 tablet by mouth 3 times daily  -     XR CERVICAL SPINE (2-3 VIEWS); Future    Hyperlipidemia, unspecified hyperlipidemia type  -     simvastatin (ZOCOR) 20 MG tablet; Take 1 tablet by mouth nightly    Obesity, Class III, BMI 40-49.9 (morbid obesity) ([facility] [address]        Return if symptoms worsen or fail to improve.      -Presentation is consistent with trapezius strain  -Will start zocor for uncontrolled HLD  -Start above treatments  -Patient advised to contact our office immediately if symptoms worsen or persist  -Patient counseled on conservative care including activity modification and OTC meds    All patient questions answered. Patient voiced understanding.

## 2022-04-01 ENCOUNTER — TELEPHONE (OUTPATIENT)
Dept: FAMILY MEDICINE CLINIC | Age: 72
End: 2022-04-01

## 2022-04-01 NOTE — TELEPHONE ENCOUNTER
----- Message from Marie Antonio DO sent at 4/1/2022  9:26 AM EDT -----  Please let patient know that her XRay displayed arthritis changes, but otherwise looked ok. I recommend that she start the baclofen and I will contact her next week.

## 2022-04-17 ENCOUNTER — PATIENT MESSAGE (OUTPATIENT)
Dept: FAMILY MEDICINE CLINIC | Age: 72
End: 2022-04-17

## 2022-04-17 DIAGNOSIS — I10 HYPERTENSION, UNSPECIFIED TYPE: ICD-10-CM

## 2022-04-18 RX ORDER — AMLODIPINE BESYLATE 10 MG/1
10 TABLET ORAL DAILY
Qty: 90 TABLET | Refills: 3 | Status: SHIPPED | OUTPATIENT
Start: 2022-04-18

## 2022-04-18 NOTE — TELEPHONE ENCOUNTER
From: Rachel Martin  To: Dr. Grace Peak: 4/17/2022 9:29 PM EDT  Subject: Prescription refill     I need refill for Amlodipine 10mg. Please make good for 90 days. I am completely out.  Thank you

## 2022-04-21 ENCOUNTER — TELEPHONE (OUTPATIENT)
Dept: FAMILY MEDICINE CLINIC | Age: 72
End: 2022-04-21

## 2022-04-21 NOTE — TELEPHONE ENCOUNTER
----- Message from Victor Manuel Penaloza sent at 4/21/2022  8:52 AM EDT -----  Subject: Appointment Request    Reason for Call: Routine Back/Neck Pain    QUESTIONS  Type of Appointment? Established Patient  Reason for appointment request? No appointments available during search  Additional Information for Provider? pt is having neck pain wanted Dr Karla Spain for apt only for in person date.   ---------------------------------------------------------------------------  --------------  CALL BACK INFO  What is the best way for the office to contact you? [place] to leave message on   [place], [place] to respond with electronic message via [employer] portal (only   for patients who have registered [employer] account)  Preferred Call Back Phone Number? [phone]  ---------------------------------------------------------------------------  --------------  SCRIPT ANSWERS  Relationship to Patient? Self  Did you have an injury or trauma within the past 3 days? No  Are you having new problems with your bowel or bladder control? No  Are you having new onset numbness, tingling, or weakness in your arms   and/or legs with this pain? No  Did your pain begin within the past 14 days? No  Are you having severe pain? No  Are you having fevers (100.4), chills, or sweats? No  (Is the patient requesting to be seen urgently for their symptoms?)? No  Have you recently (14 days) seen a provider for this issue? No  Have you been diagnosed with, awaiting test results for, or told that you   are suspected of having COVID-19 (Coronavirus)? (If patient has tested   negative or was tested as a requirement for work, school, or travel and   not based on symptoms, answer no)? No  Within the past 10 days have you developed any of the following symptoms   (answer no if symptoms have been present longer than 10 days or began   more than 10 days ago)?  Fever or Chills, Cough, Shortness of breath or   difficulty breathing, Loss of taste or smell, Sore throat, Nasal congestion, Sneezing or runny nose, Fatigue or generalized body aches   (answer no if pain is specific to a body part e.g. back pain), Diarrhea,   Headache? No  Have you had close contact with someone with COVID-19 in the last 7 days? No  (Service Expert  click yes below to proceed with [employer]   Scheduling)?  Yes

## 2022-05-02 ENCOUNTER — OFFICE VISIT (OUTPATIENT)
Dept: FAMILY MEDICINE CLINIC | Age: 72
End: 2022-05-02
Payer: MEDICARE

## 2022-05-02 VITALS
HEIGHT: 62 IN | SYSTOLIC BLOOD PRESSURE: 126 MMHG | OXYGEN SATURATION: 99 % | WEIGHT: 220.4 LBS | RESPIRATION RATE: 16 BRPM | DIASTOLIC BLOOD PRESSURE: 72 MMHG | BODY MASS INDEX: 40.56 KG/M2 | HEART RATE: 64 BPM | TEMPERATURE: 97.3 F

## 2022-05-02 DIAGNOSIS — M54.2 NECK PAIN: Primary | ICD-10-CM

## 2022-05-02 PROCEDURE — G8417 CALC BMI ABV UP PARAM F/U: HCPCS | Performed by: FAMILY MEDICINE

## 2022-05-02 PROCEDURE — 3017F COLORECTAL CA SCREEN DOC REV: CPT | Performed by: FAMILY MEDICINE

## 2022-05-02 PROCEDURE — 4040F PNEUMOC VAC/ADMIN/RCVD: CPT | Performed by: FAMILY MEDICINE

## 2022-05-02 PROCEDURE — 4004F PT TOBACCO SCREEN RCVD TLK: CPT | Performed by: FAMILY MEDICINE

## 2022-05-02 PROCEDURE — G8400 PT W/DXA NO RESULTS DOC: HCPCS | Performed by: FAMILY MEDICINE

## 2022-05-02 PROCEDURE — 1123F ACP DISCUSS/DSCN MKR DOCD: CPT | Performed by: FAMILY MEDICINE

## 2022-05-02 PROCEDURE — G8427 DOCREV CUR MEDS BY ELIG CLIN: HCPCS | Performed by: FAMILY MEDICINE

## 2022-05-02 PROCEDURE — 1090F PRES/ABSN URINE INCON ASSESS: CPT | Performed by: FAMILY MEDICINE

## 2022-05-02 PROCEDURE — 99213 OFFICE O/P EST LOW 20 MIN: CPT | Performed by: FAMILY MEDICINE

## 2022-05-02 SDOH — ECONOMIC STABILITY: FOOD INSECURITY: WITHIN THE PAST 12 MONTHS, THE FOOD YOU BOUGHT JUST DIDN'T LAST AND YOU DIDN'T HAVE MONEY TO GET MORE.: NEVER TRUE

## 2022-05-02 SDOH — ECONOMIC STABILITY: FOOD INSECURITY: WITHIN THE PAST 12 MONTHS, YOU WORRIED THAT YOUR FOOD WOULD RUN OUT BEFORE YOU GOT MONEY TO BUY MORE.: NEVER TRUE

## 2022-05-02 ASSESSMENT — SOCIAL DETERMINANTS OF HEALTH (SDOH): HOW HARD IS IT FOR YOU TO PAY FOR THE VERY BASICS LIKE FOOD, HOUSING, MEDICAL CARE, AND HEATING?: NOT HARD AT ALL

## 2022-05-02 NOTE — PROGRESS NOTES
Ary Jackson is a 80 y.o. female that presents for     Chief Complaint   Patient presents with    Neck Pain       /72   Pulse 64   Temp 97.3 °F (36.3 °C) (Infrared)   Resp 16   Ht 5' 2\" (1.575 m)   Wt 220 lb 6.4 oz (100 kg)   SpO2 99%   BMI 40.31 kg/m²       HPI    Back Pain    Started on baclofen and a Tens unit at last visit. Doesn't see much improvement with these. HPI:  Pain is present in the cervical region. Symptoms have been present for 6 month(s). The pain is constant. The patient describes the pain as aching. Inciting injury or history of trauma? No  Pain is aggravated by - head turning to the right  Pain is relieved by - nothing  Radiation of the pain? No  Paresthesias of the extremities? No  Saddle anesthesia? No  Bowel or bladder incontinence?  No              Health Maintenance   Topic Date Due    Hepatitis C screen  Never done    DTaP/Tdap/Td vaccine (1 - Tdap) 06/08/2015    COVID-19 Vaccine (3 - Booster for Pfizer series) 08/04/2021    Potassium  01/24/2022    Creatinine  01/24/2022    Depression Screen  10/21/2022    Annual Wellness Visit (AWV)  10/22/2022    Breast cancer screen  10/27/2022    A1C test (Diabetic or Prediabetic)  11/24/2022    Lipids  11/24/2022    Colorectal Cancer Screen  03/04/2031    DEXA (modify frequency per FRAX score)  Completed    Flu vaccine  Completed    Shingles vaccine  Completed    Pneumococcal 65+ years Vaccine  Completed    Hepatitis A vaccine  Aged Out    Hepatitis B vaccine  Aged Out    Hib vaccine  Aged Out    Meningococcal (ACWY) vaccine  Aged Out       Past Medical History:   Diagnosis Date    Cancer ([address]     skin cancer    Grief reaction     Hx of blood clots     right lower leg       Past Surgical History:   Procedure Laterality Date    CARPAL TUNNEL RELEASE Left     left    FOOT SURGERY Right     FOOT SURGERY Left     HYSTERECTOMY  1974    AUB    JOINT REPLACEMENT Left     hip    JOINT REPLACEMENT Right knee    KNEE SURGERY Right     LEG BIOPSY EXCISION Left 12/16/2020    EXCISION SCC LEFT LOWER LEG \"C\" AND LIPOMA LEFT ANKLE WITH FROZEN SECTION performed by Mike Prince MD at [address]    ROTATOR CUFF REPAIR Right 2009    right    SINUS SURGERY         Social History     Tobacco Use    Smoking status: Current Every Day Smoker     Packs/day: 0.50     Years: 30.00     Pack years: 15.00     Types: Cigarettes    Smokeless tobacco: Never Used   Vaping Use    Vaping Use: Never used   Substance Use Topics    Alcohol use: No    Drug use: No       Family History   Problem Relation Age of Onset    Breast Cancer Mother 67         I have reviewed the patient's past medical history, past surgical history, allergies, medications, social and family history and I have made updates where appropriate. Review of Systems        PHYSICAL EXAM:  /72   Pulse 64   Temp 97.3 °F (36.3 °C) (Infrared)   Resp 16   Ht 5' 2\" (1.575 m)   Wt 220 lb 6.4 oz (100 kg)   SpO2 99%   BMI 40.31 kg/m²     Physical Exam  Nursing note reviewed. Constitutional:       Appearance: She is well-developed. Pulmonary:      Effort: Pulmonary effort is normal. No respiratory distress. Neurological:      Mental Status: She is alert. Psychiatric:         Behavior: Behavior normal.         Thought Content: Thought content normal.         Judgment: Judgment normal.       Increase tissue tension of the right neck and shoulder area, +right spurlings, limited ROM of the neck with rotation and side bending      ASSESSMENT & PLAN  Agueda was seen today for neck pain. Diagnoses and all orders for this visit:    Neck pain  -     MRI CERVICAL SPINE WO CONTRAST; Future  [facility]        No follow-ups on file.       The most recent results of the following tests were reviewed with the patient today: none     All copied or forwarded information in the progress note was verified by me to be accurate at the time of visit  Patient's past medical, surgical, social and family history were reviewed and updated     All patient questions answered. Patient voiced understanding.

## 2022-05-10 ENCOUNTER — HOSPITAL ENCOUNTER (OUTPATIENT)
Dept: PHYSICAL THERAPY | Age: 72
Setting detail: THERAPIES SERIES
Discharge: HOME | End: 2022-05-10
Payer: MEDICARE

## 2022-05-10 PROCEDURE — 97161 PT EVAL LOW COMPLEX 20 MIN: CPT

## 2022-05-10 PROCEDURE — 97110 THERAPEUTIC EXERCISES: CPT

## 2022-05-10 NOTE — PROGRESS NOTES
** PLEASE SIGN, DATE AND TIME CERTIFICATION BELOW AND RETURN TO [facility] OUTPATIENT REHABILITATION (FAX #: [phone]). ATTEST/CO-SIGN IF ACCESSING VIA [employer]. THANK YOU.**    I certify that I have examined the patient below and determined that Physical Medicine and Rehabilitation service is necessary and that I approve the established plan of care for up to 90 days or as specifically noted. Attestation, signature or co-signature of physician indicates approval of certification requirements.    ________________________ ____________ __________  Physician Signature   Date   Time  [address] [facility]  PHYSICAL THERAPY  [x] EVALUATION  [] DAILY NOTE (LAND) [] DAILY NOTE (AQUATIC ) [] PROGRESS NOTE [] DISCHARGE NOTE    [x] [address] [facility]   [] [place] [address]    [] [facility]   [] [facility]    Date: 5/10/2022  Patient Name:  Anuj Mcginnis  : 1950  MRN: [id number]  CSN: [id number]    Referring Practitioner Wilfrido Haynes DO   Diagnosis Cervicalgia [M54.2]    Treatment Diagnosis Neck pain, UE weakness, decreased cervical ROM. Date of Evaluation 5/10/22    Additional Pertinent History Skin Cancer, Hx. Of blood clots RLE, CTR, Bilat foot sx., Left PRESTON, R TKA, R RCR      Functional Outcome Measure Used NDI   Functional Outcome Score 20/50 (5/10/22)       Insurance: Primary: Payor: Lupis Patino /  /  / ,   Secondary: [facility]   Authorization Information: OUTPATIENT BENEFITS:              DEDUCTIBLE:  $[id number]              OUT OF POCKET:  N/A              INSURANCE PAYS AT:   80%              PATIENT RESPONSIBILITY AND/OR CO-PAY:  20%  SECONDARY INSURANCE COMPANY: [employer] [facility].       PRECERTIFICATION REQUIRED:  No  INSURANCE THERAPY BENEFIT:  Patient has unlimited visits based on medical necessity  Benefit will not cover maintenance or preventative treatment.   AQUATIC THERAPY COVERED:   Yes  MODALITIES COVERED:  Yes, with the exception of iontophoresis and hot packs/cold packs  TELEHEALTH COVERED: Yes   Visit # 1, 1/10 for progress note   Visits Allowed: ALENA   Recertification Date: 5/02/73   Physician Follow-Up:    Physician Orders:    History of Present Illness: Pt notes that last August  She woke up with pain throughout her right cervical region. Pt notes that the area has been swollen. Pt notes that her symptoms have been progressing since initial onset. Pt notes that she has trialed massages, muscle relaxers, biofreeze, and chiropractic care with temporarily relief. Pt notes increased pain with turning her head and returning to neutral position. Per cervical radiograph report from 3/31/22, \"1. No acute fracture. Multilevel degenerative changes of the cervical spine are seen as described above. This appears most pronounced at the C4-C5 level. \". Pt plans to have a cervical MRI on 5/26/22. SUBJECTIVE: See above     Social/Functional History and Current Status:  Medications and Allergies have been reviewed and are listed on Medical History Questionnaire. Task Previous Current   ADLs  Independent Independent   IADL's Independent Independent   Ambulation Independent Independent   Transfers Independent Independent   Recreation2 Reading / Crocheting  Independent   Driving Active  Drives with self-imposed restrictions Increased pain c driving    Work [employer].   Occupation: [employer]  Retired       Objective:    GENERAL   Pain 5/10 Right cervical    Palpation Tender to palpation throughout right transverse process/paraspinals C3-6   Sensation Denies radicular symptoms    Observation    Edema Right UT region    Accessory Motion          POSTURE     Comments   Forward Head x    Rounded Shoulders x    Kyphosis     Lordosis     Lateral Shift     Scoliosis         NECK RANGE OF MOTION   Flexion 40 increased right cervical pain with rotating back to nuetral    Extension 32 Right cervical pain, increased pain with returning to nuetral    Rotation Right 44 Right cervical pain, increased pain with returning to nuetral    Rotation Left 42 increased right cervical pain with rotating back to nuetral    Sidebending Right 24 Right cervical pain, increased pain with returning to nuetral    Sidebending Left 15 Right cervical pain, increased pain with returning to nuetral    Retraction    Protraction    Neck Range of Motion is [place]  []     UPPER EXTREMITY STRENGTH    Left Right Comments   Shoulder Flexion 4 4- Right cervical pain     Shoulder Extension      Shoulder Abduction 4 4- Right Cervical pain     Shoulder Adduction      Shoulder External Rotation 4 4    Shoulder Internal Rotation 5- 5-    []  Shoulder Strength is grossly WFL.  Strength 36 lbs 42 lbs         SPECIAL TESTS (+/-)    Left Right Comments   Cerv. Compression      Cerv. Distraction      Cerv. Alar/Transverse - -    Vertebral Artery - -    Spurling's + -    Lateral Lexus - Ramila Katty          TREATMENT   Precautions:    Pain: 5/10 Right cervical     X in shaded column indicates activity completed today   Modalities Parameters/  Location  Notes   Combo R UT                  Manual Therapy Time/Technique  Notes         STM Right Cervical / UT / LS            Exercise/Intervention   Notes   Cervical Isometrics 5x5s  X    Supine supported Cervical rotation  6 ea   X    Chin Tuck        Scapular depression/retractions Iso                                                           Interventions Next Treatment: Cervical/shoulder/scapular strengthening, modalities/manual for pain/ROM, Cervical ROM as demetrio. Activity/Treatment Tolerance:  [x]  Patient tolerated treatment well  []  Patient limited by fatigue  []  Patient limited by pain   []  Patient limited by medical complications  []  Other:     Assessment: Pt presents with history of progressing chronic neck pain. Pt demo altered posture, decreased cervical ROM with pain, decreased UE strength.  Pt notes having pain with completion of daily activities involving her moving her neck, has been modifying trying to maintain neutral cervical positioning secondary to pain. Pt to benefit from skilled PT services to address the above deficits. Pt notes symptoms feeling \"a little better\" at the completion of the PT session. Transfer POC to Sarah Lee, PT.     Body Structures/Functions/Activity Limitations: edema, impaired activity tolerance, impaired ROM, impaired strength, pain and abnormal posture  Prognosis: fair    GOALS:  Patient Goal: Decrease pain     Short Term Goals: Deferred to long-term     Long Term Goals: 6 weeks   Pt will report decreased pain levels from 5/10 to <=3/10 for improved comfort and activity tolerance. Pt will demo cervical spine AROM WFL pain free to aid in ease with driving . Pt will improve NDI score from 20/50 to <= 15/50 to indicate decreased functional limitations. Pt to demo good body mechanics within therapy visits. Pt to be compliant and independent with HEP. Patient Education:   [x]  HEP/Education Completed: Plan of Care, Goals, body mechanics, pain management.  [employer] Access Code: [email or web address]  []  No new Education completed  []  Reviewed Prior HEP      [x]  Patient verbalized and/or demonstrated understanding of education provided. []  Patient unable to verbalize and/or demonstrate understanding of education provided. Will continue education. []  Barriers to learning:     PLAN:  Treatment Recommendations: Strengthening, Range of Motion, Endurance Training, Neuromuscular Re-education, Manual Therapy - Soft Tissue Mobilization, Pain Management, Home Exercise Program, Patient Education and Modalities    [x]  Plan of care initiated. Plan to see patient 2 times per week for 6 weeks to address the treatment planned outlined above.   []  Continue with current plan of care  []  Modify plan of care as follows:    []  Hold pending physician visit  []  Discharge    Time In 7578   Time Out 1532   Timed Code Minutes: 10 min   Total Treatment Time: 54 min       Electronically Signed by: Jean Claude Garcia PT

## 2022-05-16 ENCOUNTER — HOSPITAL ENCOUNTER (OUTPATIENT)
Dept: PHYSICAL THERAPY | Age: 72
Setting detail: THERAPIES SERIES
Discharge: HOME | End: 2022-05-16
Payer: MEDICARE

## 2022-05-16 PROCEDURE — 97110 THERAPEUTIC EXERCISES: CPT

## 2022-05-16 PROCEDURE — 97140 MANUAL THERAPY 1/> REGIONS: CPT

## 2022-05-16 NOTE — PROGRESS NOTES
[address] [facility]  PHYSICAL THERAPY  [] EVALUATION  [x] DAILY NOTE (LAND) [] DAILY NOTE (AQUATIC ) [] PROGRESS NOTE [] DISCHARGE NOTE    [x] OUTPATIENT REHABILITATION [facility]   [] [place] [address]    [] [facility]   [] Lara Sanchez    Date: 2022  Patient Name:  Ary Jackson  : 1950  MRN: [id number]  CSN: [id number]    Referring Practitioner Breonna Jewell DO   Diagnosis Cervicalgia [M54.2]    Treatment Diagnosis Neck pain, UE weakness, decreased cervical ROM. Date of Evaluation 5/10/22    Additional Pertinent History Skin Cancer, Hx. Of blood clots RLE, CTR, Bilat foot sx., Left PRESTON, R TKA, R RCR      Functional Outcome Measure Used NDI   Functional Outcome Score 20/50 (5/10/22)       Insurance: Primary: Payor: Marla Paige /  /  / ,   Secondary: [employer]   Authorization Information: OUTPATIENT BENEFITS:              DEDUCTIBLE:  $[id number]              OUT OF POCKET:  N/A              INSURANCE PAYS AT:   80%              PATIENT RESPONSIBILITY AND/OR CO-PAY:  20%  SECONDARY INSURANCE COMPANY: [employer].       PRECERTIFICATION REQUIRED:  No  INSURANCE THERAPY BENEFIT:  Patient has unlimited visits based on medical necessity  Benefit will not cover maintenance or preventative treatment. AQUATIC THERAPY COVERED:   Yes  MODALITIES COVERED:  Yes, with the exception of iontophoresis and hot packs/cold packs  TELEHEALTH COVERED: Yes   Visit # 2, 2/10 for progress note   Visits Allowed: BMN   Recertification Date:    Physician Follow-Up: None scheduled, MRI    Physician Orders:    History of Present Illness: Pt notes that last August  She woke up with pain throughout her right cervical region. Pt notes that the area has been swollen. Pt notes that her symptoms have been progressing since initial onset. Pt notes that she has trialed massages, muscle relaxers, biofreeze, and chiropractic care with temporarily relief.  Pt notes increased pain with turning her head and returning to neutral position. Per cervical radiograph report from 3/31/22, \"1. No acute fracture. Multilevel degenerative changes of the cervical spine are seen as described above. This appears most pronounced at the C4-C5 level. \". Pt plans to have a cervical MRI on 5/26/22. SUBJECTIVE: 5/10 R UT/neck constant, ache and sharp. States she felt good after last session for about 6 hours. No pain medication     Objective:  TREATMENT   Precautions:    Pain: 5/10 Right cervical     X in shaded column indicates activity completed today   Modalities Parameters/  Location  Notes   Combo R UT                  Manual Therapy Time/Technique  Notes         STM Right Cervical / UT / LS, TPR R UT, gentle UT stretch 12 min x          Exercise/Intervention   Notes   Cervical Isometrics 5x5s  X    Supine supported Cervical rotation, flex and ext 6 ea   X    Chin Tuck seated 5x3s  x    Scapular retractions seated 5x5s  x                                                       Interventions Next Treatment: Cervical/shoulder/scapular strengthening, modalities/manual for pain/ROM, Cervical ROM as demetrio. Activity/Treatment Tolerance:  [x]  Patient tolerated treatment well  []  Patient limited by fatigue  []  Patient limited by pain   []  Patient limited by medical complications  []  Other:     Assessment: Pt tolerated session well. Continued with POC as above, decreased pain noted after manual techniques. Discussed HEP and use of ice/heat. Plan to continue as tolerable, pt to monitor symptoms after session. Pain at end of session 3/10. GOALS:  Patient Goal: Decrease pain     Short Term Goals: Deferred to long-term     Long Term Goals: 6 weeks   Pt will report decreased pain levels from 5/10 to <=3/10 for improved comfort and activity tolerance. Pt will demo cervical spine AROM WFL pain free to aid in ease with driving .    Pt will improve NDI score from 20/50 to <= 15/50 to indicate decreased functional limitations. Pt to demo good body mechanics within therapy visits. Pt to be compliant and independent with HEP. Patient Education:   []  HEP/Education Completed: Plan of Care, Goals, body mechanics, pain management.  [employer] Access Code: [email or web address]  []  No new Education completed  [x]  Reviewed Prior HEP      [x]  Patient verbalized and/or demonstrated understanding of education provided. []  Patient unable to verbalize and/or demonstrate understanding of education provided. Will continue education. []  Barriers to learning:     PLAN:  Treatment Recommendations: Strengthening, Range of Motion, Endurance Training, Neuromuscular Re-education, Manual Therapy - Soft Tissue Mobilization, Pain Management, Home Exercise Program, Patient Education and Modalities    []  Plan of care initiated. Plan to see patient 2 times per week for 6 weeks to address the treatment planned outlined above.   [x]  Continue with current plan of care  []  Modify plan of care as follows:    []  Hold pending physician visit  []  Discharge    Time In 1104   Time Out 1135   Timed Code Minutes:  31min   Total Treatment Time:  31min       Electronically Signed by: Shaka Castro PTA

## 2022-05-20 ENCOUNTER — HOSPITAL ENCOUNTER (OUTPATIENT)
Dept: PHYSICAL THERAPY | Age: 72
Setting detail: THERAPIES SERIES
Discharge: HOME | End: 2022-05-20
Payer: MEDICARE

## 2022-05-20 PROCEDURE — 97035 APP MDLTY 1+ULTRASOUND EA 15: CPT

## 2022-05-20 PROCEDURE — 97110 THERAPEUTIC EXERCISES: CPT

## 2022-05-20 NOTE — PROGRESS NOTES
[address] [facility]  PHYSICAL THERAPY  [] EVALUATION  [x] DAILY NOTE (LAND) [] DAILY NOTE (AQUATIC ) [] PROGRESS NOTE [] DISCHARGE NOTE    [x] OUTPATIENT REHABILITATION [facility]   [] [place] [address]    [] [facility]   [] Lesly [place]eworth    Date: 2022  Patient Name:  Jamilah Brownlee  : 1950  MRN: [id number]  CSN: [id number]    Referring Practitioner Joaquina Mccarthy DO   Diagnosis Cervicalgia [M54.2]    Treatment Diagnosis Neck pain, UE weakness, decreased cervical ROM. Date of Evaluation 5/10/22    Additional Pertinent History Skin Cancer, Hx. Of blood clots RLE, CTR, Bilat foot sx., Left PRESTON, R TKA, R RCR      Functional Outcome Measure Used NDI   Functional Outcome Score 2050 (5/10/22)       Insurance: Primary: Payor: Wilian December /  /  / ,   Secondary: [email or web address]   Authorization Information: OUTPATIENT BENEFITS:              DEDUCTIBLE:  $[id number]              OUT OF POCKET:  N/A              INSURANCE PAYS AT:   80%              PATIENT RESPONSIBILITY AND/OR CO-PAY:  20%  SECONDARY INSURANCE COMPANY: [email or web address].       PRECERTIFICATION REQUIRED:  No  INSURANCE THERAPY BENEFIT:  Patient has unlimited visits based on medical necessity  Benefit will not cover maintenance or preventative treatment. AQUATIC THERAPY COVERED:   Yes  MODALITIES COVERED:  Yes, with the exception of iontophoresis and hot packs/cold packs  TELEHEALTH COVERED: Yes   Visit # 3, 3/10 for progress note   Visits Allowed: BMN   Recertification Date: 0/93/10   Physician Follow-Up: None scheduled, MRI    Physician Orders:    History of Present Illness: Pt notes that last August  She woke up with pain throughout her right cervical region. Pt notes that the area has been swollen. Pt notes that her symptoms have been progressing since initial onset. Pt notes that she has trialed massages, muscle relaxers, biofreeze, and chiropractic care with temporarily relief.  Pt notes increased pain with turning her head and returning to neutral position. Per cervical radiograph report from 3/31/22, \"1. No acute fracture. Multilevel degenerative changes of the cervical spine are seen as described above. This appears most pronounced at the C4-C5 level. \". Pt plans to have a cervical MRI on 5/26/22. SUBJECTIVE: Patient reporting pain level 5/10 today. Objective:  TREATMENT   Precautions:    Pain: 5/10 Right cervical     X in shaded column indicates activity completed today   Modalities Parameters/  Location  Notes   Combo R UT            US to right upper trap 50%, 1.0w/cm2, 1. 0MHz 10 min x    Manual Therapy Time/Technique  Notes         STM Right Cervical / UT / LS, TPR R UT, gentle UT stretch 12 min     Gentle upper trap stretch, pec and light traction  x    Exercise/Intervention   Notes   Cervical Isometrics 5x5s  X Held extension due to pain   Supine supported Cervical rotation, flex and ext 5 ea   X Held rotation due to pain, held extension due to pain   Chin Tuck seated 5x3s  x    Scapular retractions seated 2x5x5s  x                                                       Interventions Next Treatment: Cervical/shoulder/scapular strengthening, modalities/manual for pain/ROM, Cervical ROM as demetrio. Activity/Treatment Tolerance:  [x]  Patient tolerated treatment well  []  Patient limited by fatigue  []  Patient limited by pain   []  Patient limited by medical complications  []  Other:     Assessment: Held exercises as noted above with patient was having pain. Did initiate ultrasound with patient tolerating well. Patient reporting pain 3/10 at end of session. GOALS:  Patient Goal: Decrease pain     Short Term Goals: Deferred to long-term     Long Term Goals: 6 weeks   Pt will report decreased pain levels from 5/10 to <=3/10 for improved comfort and activity tolerance. Pt will demo cervical spine AROM WFL pain free to aid in ease with driving .    Pt will improve NDI score from 20/50 to <= 15/50 to indicate decreased functional limitations. Pt to demo good body mechanics within therapy visits. Pt to be compliant and independent with HEP. Patient Education:   []  HEP/Education Completed: US and holding off on painful ex.  [employer] Access Code: [email or web address]  []  No new Education completed  [x]  Reviewed Prior HEP      [x]  Patient verbalized and/or demonstrated understanding of education provided. []  Patient unable to verbalize and/or demonstrate understanding of education provided. Will continue education. []  Barriers to learning:     PLAN:  Treatment Recommendations: Strengthening, Range of Motion, Endurance Training, Neuromuscular Re-education, Manual Therapy - Soft Tissue Mobilization, Pain Management, Home Exercise Program, Patient Education and Modalities    []  Plan of care initiated. Plan to see patient 2 times per week for 6 weeks to address the treatment planned outlined above.   [x]  Continue with current plan of care  []  Modify plan of care as follows:    []  Hold pending physician visit  []  Discharge    Time In 1112   Time Out 1143   Timed Code Minutes:  31min   Total Treatment Time:  31min       Electronically Signed by: Tess Nichols PTA

## 2022-05-23 ENCOUNTER — HOSPITAL ENCOUNTER (OUTPATIENT)
Dept: PHYSICAL THERAPY | Age: 72
Setting detail: THERAPIES SERIES
Discharge: HOME | End: 2022-05-23
Payer: MEDICARE

## 2022-05-23 PROCEDURE — 97035 APP MDLTY 1+ULTRASOUND EA 15: CPT

## 2022-05-23 NOTE — PROGRESS NOTES
[address] [facility]  PHYSICAL THERAPY  [] EVALUATION  [x] DAILY NOTE (LAND) [] DAILY NOTE (AQUATIC ) [] PROGRESS NOTE [] DISCHARGE NOTE    [x] OUTPATIENT REHABILITATION [facility]   [] [place] [address]    [] [facility]   [] Maya Biswas    Date: 2022  Patient Name:  Kiko Smyth  : 1950  MRN: [id number]  CSN: [id number]    Referring Practitioner Jagdeep Barriga DO   Diagnosis Cervicalgia [M54.2]    Treatment Diagnosis Neck pain, UE weakness, decreased cervical ROM. Date of Evaluation 5/10/22    Additional Pertinent History Skin Cancer, Hx. Of blood clots RLE, CTR, Bilat foot sx., Left PRESTON, R TKA, R RCR      Functional Outcome Measure Used NDI   Functional Outcome Score 20/50 (5/10/22)       Insurance: Primary: Payor: Delwyn Osler /  /  / ,   Secondary: [employer]   Authorization Information: OUTPATIENT BENEFITS:              DEDUCTIBLE:  $203              OUT OF POCKET:  N/A              INSURANCE PAYS AT:   80%              PATIENT RESPONSIBILITY AND/OR CO-PAY:  20%  SECONDARY INSURANCE COMPANY: [employer].       PRECERTIFICATION REQUIRED:  No  INSURANCE THERAPY BENEFIT:  Patient has unlimited visits based on medical necessity  Benefit will not cover maintenance or preventative treatment. AQUATIC THERAPY COVERED:   Yes  MODALITIES COVERED:  Yes, with the exception of iontophoresis and hot packs/cold packs  TELEHEALTH COVERED: Yes   Visit # 4, 4/10 for progress note   Visits Allowed: BMN   Recertification Date:    Physician Follow-Up: None scheduled, MRI    Physician Orders:    History of Present Illness: Pt notes that last August  She woke up with pain throughout her right cervical region. Pt notes that the area has been swollen. Pt notes that her symptoms have been progressing since initial onset. Pt notes that she has trialed massages, muscle relaxers, biofreeze, and chiropractic care with temporarily relief.  Pt notes increased pain with turning her head and returning to neutral position. Per cervical radiograph report from 3/31/22, \"1. No acute fracture. Multilevel degenerative changes of the cervical spine are seen as described above. This appears most pronounced at the C4-C5 level. \". Pt plans to have a cervical MRI on 5/26/22. SUBJECTIVE: Patient reporting pain level 3/10 today. States she just had a massage earlier today. MRI 5/26. Objective:  TREATMENT   Precautions:    Pain: 3/10 Right cervical     X in shaded column indicates activity completed today   Modalities Parameters/  Location  Notes   Combo R UT            US to right upper trap 50%, 1.0w/cm2, 1. 0MHz 10 min x    Manual Therapy Time/Technique  Notes         STM Right Cervical / UT / LS, TPR R UT, gentle UT stretch 12 min     Gentle upper trap stretch, pec and light traction   Held today d/t pt just getting a full body massage   Exercise/Intervention   Notes   Cervical Isometrics 5x5s   Held extension due to pain   Supine supported Cervical rotation, flex and ext 5 ea    Held rotation due to pain, held extension due to pain   Chin Tuck seated 5x3s  x    Scapular retractions seated 10x5s  x                                                       Interventions Next Treatment: Cervical/shoulder/scapular strengthening, modalities/manual for pain/ROM, Cervical ROM as demetrio. Activity/Treatment Tolerance:  [x]  Patient tolerated treatment well  []  Patient limited by fatigue  []  Patient limited by pain   []  Patient limited by medical complications  []  Other:     Assessment: Pt tolerated session fairly well, continued with US and minimal exs this date. Held on manual techniques d/t pt getting a full body massage before today's session. Plan to continue x 1 visit and wait until MRI/doctor f/u.     GOALS:  Patient Goal: Decrease pain     Short Term Goals: Deferred to long-term     Long Term Goals: 6 weeks   Pt will report decreased pain levels from 5/10 to <=3/10 for improved comfort and activity tolerance. Pt will demo cervical spine AROM WFL pain free to aid in ease with driving . Pt will improve NDI score from 20/50 to <= 15/50 to indicate decreased functional limitations. Pt to demo good body mechanics within therapy visits. Pt to be compliant and independent with HEP. Patient Education:   []  HEP/Education Completed: US and holding off on painful ex.  [employer] Access Code: [email or web address]  []  No new Education completed  [x]  Reviewed Prior HEP      [x]  Patient verbalized and/or demonstrated understanding of education provided. []  Patient unable to verbalize and/or demonstrate understanding of education provided. Will continue education. []  Barriers to learning:     PLAN:  Treatment Recommendations: Strengthening, Range of Motion, Endurance Training, Neuromuscular Re-education, Manual Therapy - Soft Tissue Mobilization, Pain Management, Home Exercise Program, Patient Education and Modalities    []  Plan of care initiated. Plan to see patient 2 times per week for 6 weeks to address the treatment planned outlined above.   [x]  Continue with current plan of care  []  Modify plan of care as follows:    []  Hold pending physician visit  []  Discharge    Time In 1505   Time Out 1526   Timed Code Minutes: 21 min   Total Treatment Time:  21min       Electronically Signed by: Benji Green PTA

## 2022-05-25 ENCOUNTER — HOSPITAL ENCOUNTER (OUTPATIENT)
Dept: PHYSICAL THERAPY | Age: 72
Setting detail: THERAPIES SERIES
Discharge: HOME | End: 2022-05-25
Payer: MEDICARE

## 2022-05-25 PROCEDURE — 97035 APP MDLTY 1+ULTRASOUND EA 15: CPT

## 2022-05-25 NOTE — PROGRESS NOTES
[address] [facility]  PHYSICAL THERAPY  [] EVALUATION  [x] DAILY NOTE (LAND) [] DAILY NOTE (AQUATIC ) [] PROGRESS NOTE [] DISCHARGE NOTE    [x] OUTPATIENT REHABILITATION [facility]   [] [place] [address]    [] [facility]   [] [employer] [employer]    Date: 2022  Patient Name:  Abena Meneses  : 1950  MRN: [id number]  CSN: [id number]    Referring Practitioner Cecilio Campos DO   Diagnosis Cervicalgia [M54.2]    Treatment Diagnosis Neck pain, UE weakness, decreased cervical ROM. Date of Evaluation 5/10/22    Additional Pertinent History Skin Cancer, Hx. Of blood clots RLE, CTR, Bilat foot sx., Left PRESTON, R TKA, R RCR      Functional Outcome Measure Used NDI   Functional Outcome Score 20/50 (5/10/22)       Insurance: Primary: Payor: Zachary Grant /  /  / ,   Secondary: [employer]   Authorization Information: OUTPATIENT BENEFITS:              DEDUCTIBLE:  $203              OUT OF POCKET:  N/A              INSURANCE PAYS AT:   80%              PATIENT RESPONSIBILITY AND/OR CO-PAY:  20%  SECONDARY INSURANCE COMPANY: [employer].       PRECERTIFICATION REQUIRED:  No  INSURANCE THERAPY BENEFIT:  Patient has unlimited visits based on medical necessity  Benefit will not cover maintenance or preventative treatment. AQUATIC THERAPY COVERED:   Yes  MODALITIES COVERED:  Yes, with the exception of iontophoresis and hot packs/cold packs  TELEHEALTH COVERED: Yes   Visit # 5, 5/10 for progress note   Visits Allowed: BMN   Recertification Date:    Physician Follow-Up: None scheduled, MRI    Physician Orders:    History of Present Illness: Pt notes that last August  She woke up with pain throughout her right cervical region. Pt notes that the area has been swollen. Pt notes that her symptoms have been progressing since initial onset. Pt notes that she has trialed massages, muscle relaxers, biofreeze, and chiropractic care with temporarily relief.  Pt notes increased pain with turning her head and returning to neutral position. Per cervical radiograph report from 3/31/22, \"1. No acute fracture. Multilevel degenerative changes of the cervical spine are seen as described above. This appears most pronounced at the C4-C5 level. \". Pt plans to have a cervical MRI on 5/26/22. SUBJECTIVE: Patient reports not feeling well today. 9/10 pain upon arrival, no pain medication. Pt states she had relief from last session for a few hours then pain came back. Pt states she has a TENS unit at home that she uses occasionally by that  Increased her pain sometimes. Objective:  TREATMENT   Precautions:    Pain: 9/10 Right cervical     X in shaded column indicates activity completed today   Modalities Parameters/  Location  Notes   Combo R UT            US to right upper trap 50%, 1.0w/cm2, 1. 0MHz 10 min x Pain decreased to 5-6/10 after US. Manual Therapy Time/Technique  Notes         STM Right Cervical / UT / LS, TPR R UT, gentle UT stretch 12 min     Gentle upper trap stretch, pec and light traction   Held today d/t pt just getting a full body massage   Exercise/Intervention   Notes   Cervical Isometrics 5x5s   Held extension due to pain   Supine supported Cervical rotation, flex and ext 5 ea    Held rotation due to pain, held extension due to pain   Chin Tuck seated 5x3s      Scapular retractions seated 10x5s  x                                                       Interventions Next Treatment: Cervical/shoulder/scapular strengthening, modalities/manual for pain/ROM, Cervical ROM as demetrio. Activity/Treatment Tolerance:  [x]  Patient tolerated treatment well  []  Patient limited by fatigue  [x]  Patient limited by pain   []  Patient limited by medical complications  []  Other:     Assessment: Pt had decreased pain at end of session to 5-6/10. Pt to continue with HEP, gets MRI tomorrow, and will call us after doctor appt.      GOALS:  Patient Goal: Decrease pain     Short Term Goals: Deferred to long-term     Long Term Goals: 6 weeks   Pt will report decreased pain levels from 5/10 to <=3/10 for improved comfort and activity tolerance. Pt will demo cervical spine AROM WFL pain free to aid in ease with driving . Pt will improve NDI score from 20/50 to <= 15/50 to indicate decreased functional limitations. Pt to demo good body mechanics within therapy visits. Pt to be compliant and independent with HEP. Patient Education:   []  HEP/Education Completed: US and holding off on painful ex.  [employer] Access Code: [email or web address]  []  No new Education completed  [x]  Reviewed Prior HEP      [x]  Patient verbalized and/or demonstrated understanding of education provided. []  Patient unable to verbalize and/or demonstrate understanding of education provided. Will continue education. []  Barriers to learning:     PLAN:  Treatment Recommendations: Strengthening, Range of Motion, Endurance Training, Neuromuscular Re-education, Manual Therapy - Soft Tissue Mobilization, Pain Management, Home Exercise Program, Patient Education and Modalities    []  Plan of care initiated. Plan to see patient 2 times per week for 6 weeks to address the treatment planned outlined above.   [x]  Continue with current plan of care  []  Modify plan of care as follows:    []  Hold pending physician visit  []  Discharge    Time In 1245   Time Out 1307   Timed Code Minutes: 22 min   Total Treatment Time:  22min       Electronically Signed by: Deb Navarro PTA

## 2022-05-26 ENCOUNTER — HOSPITAL ENCOUNTER (OUTPATIENT)
Dept: MRI IMAGING | Age: 72
Discharge: HOME | End: 2022-05-26
Payer: MEDICARE

## 2022-05-26 DIAGNOSIS — M54.2 NECK PAIN: ICD-10-CM

## 2022-05-26 PROCEDURE — 72141 MRI NECK SPINE W/O DYE: CPT

## 2022-05-27 ENCOUNTER — TELEPHONE (OUTPATIENT)
Dept: FAMILY MEDICINE CLINIC | Age: 72
End: 2022-05-27

## 2022-05-27 DIAGNOSIS — M54.2 NECK PAIN: Primary | ICD-10-CM

## 2022-05-27 NOTE — TELEPHONE ENCOUNTER
----- Message from ROVERTO Briones CNP sent at 5/27/2022 11:38 AM EDT -----  MRI showed several chronic changes that need followed up on. Recommend a referral to Neurosurgery. Would she be okay with seeing them for further recommendations?

## 2022-05-27 NOTE — TELEPHONE ENCOUNTER
Pt informed and understanding     Pt agrees to referral, does not have a preference besides being within [facility] ([facility])

## 2022-05-27 NOTE — TELEPHONE ENCOUNTER
Referral placed to [facility] Neurosurgery  Electronically signed by ROVERTO Taylor CNP on 5/27/2022 at 1:22 PM

## 2022-06-02 NOTE — DISCHARGE SUMMARY
[facility] NOTE  OUTPATIENT  [address] [facility].    Patient Name: Amara Mckay        CSN: [id number]   YOB: 1950  Gender: female  Francine Evans DO,    Cervicalgia [M54.2] ,      Patient is discharged from Physical Therapy services at this time. See last note for details related to results of therapy and goal achievement. Reason for discharge: Patient requested to be discharged. . Had MRI and is to see neurologist.        Sri Medrano.  [facility] # [id number]

## 2022-06-03 ENCOUNTER — HOSPITAL ENCOUNTER (OUTPATIENT)
Age: 72
Discharge: HOME | End: 2022-06-03
Payer: MEDICARE

## 2022-06-03 DIAGNOSIS — E78.00 HYPERCHOLESTEROLEMIA: ICD-10-CM

## 2022-06-03 LAB
CHOLESTEROL, TOTAL: 210 MG/DL (ref 100–199)
HDLC SERPL-MCNC: 61 MG/DL
LDL CHOLESTEROL CALCULATED: 122 MG/DL
TRIGL SERPL-MCNC: 135 MG/DL (ref 0–199)

## 2022-06-03 PROCEDURE — 80061 LIPID PANEL: CPT

## 2022-06-03 PROCEDURE — 36415 COLL VENOUS BLD VENIPUNCTURE: CPT

## 2022-06-06 ENCOUNTER — TELEPHONE (OUTPATIENT)
Dept: FAMILY MEDICINE CLINIC | Age: 72
End: 2022-06-06

## 2022-06-06 NOTE — TELEPHONE ENCOUNTER
----- Message from ROVERTO Valdivia CNP sent at 6/6/2022 10:14 AM EDT -----  Labs have improved.   Continue current measures

## 2022-06-23 ENCOUNTER — OFFICE VISIT (OUTPATIENT)
Dept: NEUROSURGERY | Age: 72
End: 2022-06-23
Payer: MEDICARE

## 2022-06-23 VITALS
WEIGHT: 220 LBS | BODY MASS INDEX: 40.48 KG/M2 | HEIGHT: 62 IN | DIASTOLIC BLOOD PRESSURE: 80 MMHG | SYSTOLIC BLOOD PRESSURE: 124 MMHG

## 2022-06-23 DIAGNOSIS — M54.12 CERVICAL RADICULOPATHY: Primary | ICD-10-CM

## 2022-06-23 DIAGNOSIS — M79.18 MYOFASCIAL PAIN: ICD-10-CM

## 2022-06-23 PROCEDURE — 1123F ACP DISCUSS/DSCN MKR DOCD: CPT

## 2022-06-23 PROCEDURE — G8400 PT W/DXA NO RESULTS DOC: HCPCS

## 2022-06-23 PROCEDURE — 3017F COLORECTAL CA SCREEN DOC REV: CPT

## 2022-06-23 PROCEDURE — 1090F PRES/ABSN URINE INCON ASSESS: CPT

## 2022-06-23 PROCEDURE — 4004F PT TOBACCO SCREEN RCVD TLK: CPT

## 2022-06-23 PROCEDURE — G8427 DOCREV CUR MEDS BY ELIG CLIN: HCPCS

## 2022-06-23 PROCEDURE — G8417 CALC BMI ABV UP PARAM F/U: HCPCS

## 2022-06-23 PROCEDURE — 99203 OFFICE O/P NEW LOW 30 MIN: CPT

## 2022-06-23 ASSESSMENT — ENCOUNTER SYMPTOMS
TROUBLE SWALLOWING: 0
BACK PAIN: 0
PHOTOPHOBIA: 0
NAUSEA: 0
COLOR CHANGE: 0
SHORTNESS OF BREATH: 0
CONSTIPATION: 0
COUGH: 0

## 2022-06-23 NOTE — PROGRESS NOTES
[facility] PROFESSIONAL SERVS  [address] [place] [address]  [facility]  Dept: [phone]  Dept Fax: [phone]      Patient Name:  Anuj Mcginnis  Visit Date:  6/23/2022    HPI:     Ms. Sharon Mariee is a 80 y.o. female that presents today at [facility] Neurosurgery for evaluation of the following:      Chief Complaint   Patient presents with    New Patient     neck pain         HPI   Haroon Finney is a 80year old female that presents to the office with her . Patient ambulates without assistive device. Patient stated that her cervical pain started in August of 2021. She stated that it has progressively gotten worse. She stated her pain is constant. Patient describes her pain has a tight muscle that also is sharp and shooting when she turns her head to the right. The pain radiates up her occiput and behind her right ear and into her right shoulder. She stated her pain at its worst is a 10 and on average a 5. She stated that the pain has caused her to be fatigued. And her activity has decreased. She stated she does have muscle spasms. She stated that she was prescribed baclofen in the past and felt that it did not provide her relief. She stated she stopped taking it due to it making her too tired. She stated her right hand grasp is slightly weaker than the left. Patient is right-handed she complains of numbness on the right side of her neck and down her right shoulder when she turns her head to the right. She has tried physical therapy and that did not work. She has tried biofreeze and it was effective. She has tried heat and it increased her muscle stiffness. She has not tried ice. She has tried chiropractic care and massage therapy and both helped relieve her pain. Patient has taken Aleve for her headaches in the past and it has helped. Patient stated that she has not recently taken any of this medication. She stated she has decreased ROM when she turns to the right. She stated that she is experiencing headaches from her cervical pain. Patient  stated that the pain does wake her up at night. She stated that she is having neck stiffness. She has never seen pain management. She has had injection therapy to her right shoulder which helped. She denies cervical, thoracic, or lumbar surgery. She has had a right rotator cuff surgery. Patient denies bowel or bladder incontinence. Patient denies saddle anesthesia. Patient denies any swallowing issues. Patient denies dizziness. Patient  denies any recent fall or trauma. Medications:    Current Outpatient Medications:     amLODIPine (NORVASC) 10 MG tablet, Take 1 tablet by mouth daily, Disp: 90 tablet, Rfl: 3    baclofen (LIORESAL) 10 MG tablet, Take 1 tablet by mouth 3 times daily, Disp: 90 tablet, Rfl: 3    simvastatin (ZOCOR) 20 MG tablet, Take 1 tablet by mouth nightly, Disp: 90 tablet, Rfl: 1    hydroCHLOROthiazide (HYDRODIURIL) 12.5 MG tablet, Take 1 tablet by mouth every morning, Disp: 90 tablet, Rfl: 3    citalopram (CELEXA) 20 MG tablet, Take 1 tab PO q daily, Disp: 90 tablet, Rfl: 3    Handicap Placard MISC, by Does not apply route Expires 10/21/26, Disp: 1 each, Rfl: 0    acetaminophen (TYLENOL) 325 MG tablet, Take 650 mg by mouth every 6 hours as needed for Pain, Disp: , Rfl:     vitamin B-12 (CYANOCOBALAMIN) 500 MCG tablet, Take 500 mcg by mouth daily, Disp: , Rfl:     melatonin 3 MG TABS tablet, Take 3 mg by mouth daily. , Disp: , Rfl:     The patient has No Known Allergies. Past Medical History  Davonte Ledesma  has a past medical history of Cancer ([facility] [address] Grief reaction, and Hx of blood clots. Past Surgical History  The patient  has a past surgical history that includes Carpal tunnel release (Left); sinus surgery; Rotator cuff repair (Right, 2009); Foot surgery (Right); Foot surgery (Left); knee surgery (Right); joint replacement (Left); joint replacement (Right);  Leg biopsy excision (Left, 12/16/2020); Hysterectomy (1974); and Ovary removal (1974). Family History  This patient's family history includes Breast Cancer (age of onset: 67) in her mother. Social History  Agueda  reports that she has been smoking cigarettes. She has a 15.00 pack-year smoking history. She has never used smokeless tobacco. She reports that she does not drink alcohol and does not use drugs. Subjective:      Review of Systems   Constitutional: Positive for activity change and fatigue. Negative for appetite change. HENT: Negative for trouble swallowing. Eyes: Negative for photophobia and visual disturbance. Respiratory: Negative for cough and shortness of breath. Cardiovascular: Negative for chest pain. Gastrointestinal: Negative for constipation and nausea. Genitourinary: Negative for difficulty urinating. Musculoskeletal: Positive for myalgias, neck pain and neck stiffness. Negative for back pain and gait problem. Skin: Negative for color change, rash and wound. Neurological: Positive for weakness, numbness and headaches. Negative for dizziness. Psychiatric/Behavioral: Positive for sleep disturbance. Negative for confusion. The patient is not nervous/anxious. Objective:     /80 (Site: Right Upper Arm, Position: Sitting, Cuff Size: Large Adult)   Ht 5' 2\" (1.575 m)   Wt 220 lb (99.8 kg)   BMI 40.24 kg/m²          Physical Exam  Constitutional:       Appearance: Normal appearance. She is not ill-appearing. HENT:      Nose: Nose normal.      Mouth/Throat:      Mouth: Mucous membranes are moist.   Eyes:      Pupils: Pupils are equal, round, and reactive to light. Cardiovascular:      Rate and Rhythm: Normal rate. Pulses: Normal pulses. Pulmonary:      Effort: Pulmonary effort is normal.   Abdominal:      General: Bowel sounds are normal.   Musculoskeletal:         General: Swelling and tenderness present. Cervical back: Swelling, spasms and tenderness present. Pain with movement present. Decreased range of motion. Back:    Skin:     General: Skin is warm and dry. Findings: No erythema. Neurological:      Mental Status: She is alert and oriented to person, place, and time. Sensory: Sensory deficit (right side of neck ) present. Motor: Weakness present. Comments: RUE 4/5  LUE 5/5   Psychiatric:         Mood and Affect: Mood normal.         Behavior: Behavior normal.         Thought Content: Thought content normal.         Judgment: Judgment normal.         Reviewed MRI Type: cervical spine  Film and Report    Lab Results   Component Value Date    WBC 6.7 03/07/2021    HGB 14.3 03/07/2021    HCT 43.7 03/07/2021     03/07/2021    CHOL 210 (H) 06/03/2022    TRIG 135 06/03/2022    HDL 61 06/03/2022    ALT 11 01/16/2019    AST 13 01/16/2019     12/08/2020    K 4.0 12/08/2020     12/08/2020    CREATININE 0.9 12/08/2020    BUN 14 12/08/2020    CO2 27 12/08/2020    TSH 3.080 01/16/2019    LABA1C 5.9 11/24/2021       Assessment and Plan      Diagnosis Orders   1. Cervical radiculopathy     2. Myofascial pain         -New patient referral for cervical pain  -MRI of the cervical spine reviewed with Dr. Marga Crane with patient and . He is recommending a pain management referral and if she does not get relief from injection therapy he is recommending surgical intervention in the form of ACDF C3-4, C4-5, C5-6 with possible C6-7  -CT of the cervical spine without contrast  -Patient management referral to Dr. Socorro Marcial for cervical radiculopathy and myofascial pain   -Continue gentle  Stretches that was provided by therapy  -Apply Ice to cervical spine and shoulders 20 minutes at a time every 2 hours for pain  - Ok to take Aleve for cervical pain  -Follow up in 1 month after seen pain management and CT of the   cervical spine completed  -Call office if symptoms worsen or fail to improve   -All patient questions answered.   Pt voiced understanding.  Patient instructed to call the office with any questions or concerns    Electronically signed by ROVERTO Ragland CNP on 6/23/2022 at 9:59 AM

## 2022-06-28 ENCOUNTER — HOSPITAL ENCOUNTER (OUTPATIENT)
Dept: CT IMAGING | Age: 72
Discharge: HOME | End: 2022-06-28
Payer: MEDICARE

## 2022-06-28 DIAGNOSIS — M79.18 MYOFASCIAL PAIN: ICD-10-CM

## 2022-06-28 DIAGNOSIS — M54.12 CERVICAL RADICULOPATHY: ICD-10-CM

## 2022-06-28 PROCEDURE — 72125 CT NECK SPINE W/O DYE: CPT

## 2022-06-29 ENCOUNTER — OFFICE VISIT (OUTPATIENT)
Dept: PHYSICAL MEDICINE AND REHAB | Age: 72
End: 2022-06-29
Payer: MEDICARE

## 2022-06-29 VITALS
SYSTOLIC BLOOD PRESSURE: 152 MMHG | WEIGHT: 223 LBS | HEIGHT: 62 IN | DIASTOLIC BLOOD PRESSURE: 74 MMHG | BODY MASS INDEX: 41.04 KG/M2

## 2022-06-29 DIAGNOSIS — M47.812 CERVICAL SPONDYLOSIS: Primary | ICD-10-CM

## 2022-06-29 DIAGNOSIS — G89.29 CHRONIC MYOFASCIAL PAIN: ICD-10-CM

## 2022-06-29 DIAGNOSIS — M79.18 CHRONIC MYOFASCIAL PAIN: ICD-10-CM

## 2022-06-29 DIAGNOSIS — G89.4 CHRONIC PAIN SYNDROME: ICD-10-CM

## 2022-06-29 DIAGNOSIS — M47.819 FACET ARTHROPATHY: ICD-10-CM

## 2022-06-29 PROCEDURE — 99214 OFFICE O/P EST MOD 30 MIN: CPT | Performed by: NURSE PRACTITIONER

## 2022-06-29 PROCEDURE — G8427 DOCREV CUR MEDS BY ELIG CLIN: HCPCS | Performed by: NURSE PRACTITIONER

## 2022-06-29 PROCEDURE — 4004F PT TOBACCO SCREEN RCVD TLK: CPT | Performed by: NURSE PRACTITIONER

## 2022-06-29 PROCEDURE — 1090F PRES/ABSN URINE INCON ASSESS: CPT | Performed by: NURSE PRACTITIONER

## 2022-06-29 PROCEDURE — G8400 PT W/DXA NO RESULTS DOC: HCPCS | Performed by: NURSE PRACTITIONER

## 2022-06-29 PROCEDURE — 1123F ACP DISCUSS/DSCN MKR DOCD: CPT | Performed by: NURSE PRACTITIONER

## 2022-06-29 PROCEDURE — 3017F COLORECTAL CA SCREEN DOC REV: CPT | Performed by: NURSE PRACTITIONER

## 2022-06-29 PROCEDURE — G8417 CALC BMI ABV UP PARAM F/U: HCPCS | Performed by: NURSE PRACTITIONER

## 2022-06-29 NOTE — PROGRESS NOTES
Chronic Pain/PM&R Clinic Note     Encounter Date: 6/29/22    Subjective:   Chief Complaint:   Chief Complaint   Patient presents with    New Patient     cervical radiculopathy and myofascial pain        History of Present Illness:   Norma Hinton is a 80 y.o. female seen in the clinic initially on 06/29/2022 upon request from HARRIET Valencia  for her history of neck pain. Patient states her pain started in August 2021. She woke up in the morning with right-sided neck pain. She thought she just slept wrong. Patient states her pain has been getting gradually worse ever since then. She states pain is aggravated with activity. She states the right side of her neck gets tight and aches. She states if she turns a certain way she feels like her neck \"catches\". She states she is sleeping okay at night but she has to use pillows to get her head in an appropriate position/alignment. She states she does have a history of headaches while these are unchanged since her neck pain is started. Patient states she has tried using ice and heat which is ineffective. She does use in a supportive neck pillow to prop her head and this seems to help with the pain. Patient denies history of falls. She does not use a cane or walker for ambulation. She denies any focal arm weakness, arm paresthesias, gait/balance disturbances. He did pursue physical therapy which was ineffective or even aggravated her neck pain. She is also tried chiropractic care and massage therapy which does seem to help. She currently lives at home with her  and her granddaughter.     History of Interventions:   Surgery: No previous cervical surgeries  Injections: None    Current Treatment Medications:   biofreeze - effective     Historical Treatment Medications:   Baclofen 10 mg- fatigue     Imaging:  MRI cervical (05/26/2022)  Narrative   PROCEDURE: MRI CERVICAL SPINE WO CONTRAST       CLINICAL INFORMATION: Neck pain .       COMPARISON: Plain radiographs dated 31 March 2022. .       TECHNIQUE: Sagittal T1, T2 and STIR sequences were obtained through the cervical spine. Axial fast and echo and gradient echo T2-weighted images were obtained.       FINDINGS:       The cervical vertebral bodies are normally aligned. There is straightening of the normal cervical lordosis. . There is degenerative change in the vertebral body endplates adjacent to the C4-5, C5-6, C6-7 and C7-T1 disc spaces. Curvin Lima is an abnormal facet    joint on the right side at C3-4.        The cervical spinal cord is of normal caliber and signal intensity.  There is increased signal intensity within the natalia, possibly representing ischemic changes. .       On the axial images, at C2-C3, there is no disc herniation, canal stenosis, cord compression or foraminal stenosis.       At C3-C4, there is a 1.5 mm ventral extradural defect secondary to bulging disc and osteophyte. This results in mild canal stenosis with no cord compression. There is mild-to-moderate bilateral foraminal stenosis.       At C4-C5, there is a 1.5 mm ventral extradural defect secondary bulging disc and uncinate process hypertrophy. This causes mild canal stenosis with no cord compression. There is moderate right and mild-to-moderate left-sided foraminal stenosis.       At C5-C6, there is a 1 mm ventral extradural defect secondary bulging disc and uncinate process hypertrophy this results in mild canal stenosis with no cord compression. There is mild-to-moderate bilateral foraminal stenosis.       At C6-C7, there is a 1.9 mm ventral extradural defect secondary bulging disc and uncinate process hypertrophy. This causes mild canal stenosis with no cord compression. There is moderate bilateral foraminal stenosis.       At C7-T1, there is a 1.2 mm ventral extradural defect secondary bulging disc and uncinate process hypertrophy. This causes mild canal stenosis with no cord compression.  There is moderate severe right and moderate left-sided foraminal stenosis.       There are no suspicious findings in the cervical soft tissues.               Impression           1. Straightening of the normal cervical lordosis, degenerative change in the vertebral body endplates adjacent to the C4-5, C5-6, C6-7 and C7 disc spaces. Abnormal facet joint on the right side at C3-4.   2. There is mild canal stenosis with no cord compression, moderate to severe right and moderate left-sided foraminal stenosis at C7-T1.   3. There is mild canal stenosis with no cord compression and moderate bilateral foraminal stenosis at C6-7.   4. There is mild canal stenosis with no cord compression, moderate right and mild-to-moderate left-sided foraminal stenosis at C4-5.   5. There is mild canal stenosis with no cord compression and mild-to-moderate bilateral foraminal stenosis at C3-4 and C5-6.   6. There is increased signal intensity in the natalia most likely representing ischemic changes.           **This report has been created using voice recognition software. It may contain minor errors which are inherent in voice recognition technology. **       Final report electronically signed by DR Gloria Lezama on 5/26/2022 1:07 PM       Past Medical History:   Diagnosis Date    Cancer ([facility] [address]     skin cancer    Grief reaction     Hx of blood clots     right lower leg       Past Surgical History:   Procedure Laterality Date    CARPAL TUNNEL RELEASE Left     left    FOOT SURGERY Right     FOOT SURGERY Left     HYSTERECTOMY (CERVIX STATUS UNKNOWN)  1974    AUB    JOINT REPLACEMENT Left     hip    JOINT REPLACEMENT Right     knee    KNEE SURGERY Right     LEG BIOPSY EXCISION Left 12/16/2020    EXCISION SCC LEFT LOWER LEG \"C\" AND LIPOMA LEFT ANKLE WITH FROZEN SECTION performed by Darrius Hays MD at [address]    ROTATOR CUFF REPAIR Right 2009    right    SINUS SURGERY         Family History   Problem Relation Age of Onset    Breast Cancer Mother 67       Medications & Allergies:   Current Outpatient Medications   Medication Instructions    acetaminophen (TYLENOL) 650 mg, Oral, EVERY 6 HOURS PRN    amLODIPine (NORVASC) 10 mg, Oral, DAILY    baclofen (LIORESAL) 10 mg, Oral, 3 TIMES DAILY    citalopram (CELEXA) 20 MG tablet Take 1 tab PO q daily    Handicap Placard MISC Does not apply, Expires 10/21/26    hydroCHLOROthiazide (HYDRODIURIL) 12.5 mg, Oral, EVERY MORNING    melatonin 3 mg, Oral, DAILY    simvastatin (ZOCOR) 20 mg, Oral, NIGHTLY    vitamin B-12 (CYANOCOBALAMIN) 500 mcg, Oral, DAILY       No Known Allergies    Review of Systems:   Constitutional: negative for weight changes or fevers  Genitourinary: negative for bowel/bladder incontinence   Musculoskeletal: positive for right neck pain  Neurological: negative for any arm weakness or numbness/tingling  Behavioral/Psych: negative for anxiety/depression   All other systems reviewed and are negative    Objective:     Vitals:    06/29/22 1428   BP: (!) 152/74       Constitutional: Pleasant, no acute distress   Head: Normocephalic, atraumatic   Eyes: Conjunctivae normal   Neck: Supple, symmetrical   Respiration: Non-labored breathing   Cardiovascular: Limbs warm and well perfused   Musculoskeletal: Full cervical ROM in all planes, increased pain with rotation, right rotation worse than left. Negative Spurling maneuver bilaterally. Increased pain with facet loading. No tenderness to palpation in cervical paraspinals or other posterior neck musculature. Neuro: Alert, oriented. CN II-XII appear grossly intact. Motor strength 5/5 SAb, EF, EE, WE, Padmaja. LT sensation intact in upper limbs. Biceps/triceps reflexes 2+ and symmetrical. Negative Chester bilaterally. Skin: no skin rashes or lesions noted   Psychological: Cooperative, no exaggerated pain behaviors     Assessment:    Diagnosis Orders   1.  Cervical spondylosis  CHG FLUOR NEEDLE/CATH SPINE/PARASPINAL DX/THER ADDON    WI INJ DX/THER AGNT PARAVERT FACET JOINT, CERV/THORAC, 1ST LEVEL    DC INJ DX/THER AGNT PARAVERT FACET JOINT, CERV/THORAC, 2ND LEVEL   2. Chronic pain syndrome     3. Facet arthropathy     4. Chronic myofascial pain         Ruba Anderson is a 80 y. o.female presenting to the pain clinic for evaluation of neck pain. Patient's history and physical consistent with cervical facet mediated pain at right C3-4 and C4-5. I set her up for a cervical facet medial branch block at these levels. We also discussed that she likely has myofascial pain contributing to the pain in her neck. I have recommended trying over-the-counter Aleve and Voltaren gel as she has not tried an over-the-counter NSAID. Plan: The following treatment recommendations and plan were discussed in detail with Ruba Anderson. Imaging:   I have reviewed patients imaging of Cervical MRI and results were discussed with patient today. Analgesics:   Patient advised to start Aleve. Patient is advised to take as prescribed and not take on an empty stomach. Adjuvants: For chronic pain with associated depression, the patient is advised to continue Celexa. For continued chronic pain with associated musculoskeletal component, the patient is advised to continue baclofen. Interventions: In presence of neck pain and with physical exam consistent for facetal pain, the option of medial branch blocks at right C3-4 and C4-5 was chosen. The risks and benefits were discussed in detail with the patient. Patient wants to proceed with the injection. Anticoagulation/NPO Recommendations:   Patient does need to hold Aleve x 4days prior to the procedure. Patient will need to be NPO x 8 hours prior to the procedure.   We will start an IV prior to the procedure    Multidisciplinary Pain Management:   In the presence of complex, chronic, and multi-factorial pain, the importance of a multidisciplinary approach to pain management in the patients management regimen was emphasized and discussed in great detail. PHYSICAL THERAPY: Patient is advised to see a physical therapist for gentle stretching exercises and conditioning exercises for management of pain. PSYCHOLOGY: Patient is advised to see a clinical pain psychologist, for the psychosocial aspect of pain care through coping skills, relaxation strategies, cognitive group therapy etc.   OBESITY: Patient is advised to seek nutrition consult to help in managing their weight to decrease its impact on pain. SMOKING: Impact of smoking in patient's pain was discussed and patient is counseled against smoking. The patient was also advised to continue physical therapy and stretching exercises at home and cognitive behavioral and/or group therapy.      Referrals:  None    Prescriptions Written This Visit:   None    Follow-up: Cervical MBB, follow up in office 1 week later    ROVERTO Schmid - CNP

## 2022-07-05 ENCOUNTER — PREP FOR PROCEDURE (OUTPATIENT)
Dept: PHYSICAL MEDICINE AND REHAB | Age: 72
End: 2022-07-05

## 2022-07-05 ENCOUNTER — NURSE ONLY (OUTPATIENT)
Dept: FAMILY MEDICINE CLINIC | Age: 72
End: 2022-07-05

## 2022-07-05 DIAGNOSIS — Z20.822 EXPOSURE TO CONFIRMED CASE OF COVID-19: Primary | ICD-10-CM

## 2022-07-05 PROCEDURE — 87635 SARS-COV-2 COVID-19 AMP PRB: CPT | Performed by: FAMILY MEDICINE

## 2022-07-05 NOTE — H&P
Today, patient presents for planned medial branch blocks at right C3-4 and C4-5. This note is reflective of the patient's previous visit for evaluation. We will proceed with today's planned procedure. Since patient's last visit for evaluation, there have been no interval changes in medical history. Patient has no new numbness, weakness, or focal neurological deficit since evaluation. Patient has no contraindications to injection (no anticoagulation or recent antibiotic intake for active infections), and has a  present or is able to drive themselves (as discussed and cleared by physician). Allergies to latex, contrast dye, and steroid medications have been confirmed with the patient prior to the procedure. NPO necessity has been assessed and accepted based on procedure complexity. The risks and benefits of the procedure have been explained including but are not limited to infection, bleeding, paralysis, immediate post procedure weakness, and dizziness; the patient acknowledges understanding and desires to proceed with the procedure. Patient has signed consent for same procedure as discussed in previous clinic encounter. All other questions and concerns were addressed at bedside. See procedure note for full details. Post procedure Instructions: The patient was advised not to drive during the day of the procedure and not to engage in any significant decision making (unless otherwise states by physician). The patient was also advised to be cautious with walking/activity for 24 hours following today's visit and asked not to engage in over-exertion (unless otherwise states by physician). After this time, it is ok to resume pre-procedure level of activity. Patient advised to apply ice to site of injection in situations of pain and discomfort. Patient advised to not submerge site of injection during bath or pool activities for approximately 24 hours post-procedure.  Patient attested to understanding post procedure directions / restrictions. All other questions and concerns addressed before patient discharge in ambulatory fashion. Chronic Pain/PM&R Clinic Note     Encounter Date: 6/29/22    Subjective:   Chief Complaint:   No chief complaint on file. History of Present Illness:   Marielos Prieto is a 80 y.o. female seen in the clinic initially on 06/29/2022 upon request from HARRIET Arnold  for her history of neck pain. Patient states her pain started in August 2021. She woke up in the morning with right-sided neck pain. She thought she just slept wrong. Patient states her pain has been getting gradually worse ever since then. She states pain is aggravated with activity. She states the right side of her neck gets tight and aches. She states if she turns a certain way she feels like her neck \"catches\". She states she is sleeping okay at night but she has to use pillows to get her head in an appropriate position/alignment. She states she does have a history of headaches while these are unchanged since her neck pain is started. Patient states she has tried using ice and heat which is ineffective. She does use in a supportive neck pillow to prop her head and this seems to help with the pain. Patient denies history of falls. She does not use a cane or walker for ambulation. She denies any focal arm weakness, arm paresthesias, gait/balance disturbances. He did pursue physical therapy which was ineffective or even aggravated her neck pain. She is also tried chiropractic care and massage therapy which does seem to help. She currently lives at home with her  and her granddaughter.     History of Interventions:   Surgery: No previous cervical surgeries  Injections: None    Current Treatment Medications:   biofreeze - effective     Historical Treatment Medications:   Baclofen 10 mg- fatigue     Imaging:  MRI cervical (05/26/2022)  Narrative   PROCEDURE: MRI CERVICAL SPINE WO CONTRAST     CLINICAL INFORMATION: Neck pain .       COMPARISON: Plain radiographs dated 31 March 2022. .       TECHNIQUE: Sagittal T1, T2 and STIR sequences were obtained through the cervical spine. Axial fast and echo and gradient echo T2-weighted images were obtained.       FINDINGS:       The cervical vertebral bodies are normally aligned. There is straightening of the normal cervical lordosis. . There is degenerative change in the vertebral body endplates adjacent to the C4-5, C5-6, C6-7 and C7-T1 disc spaces. Margo Buggy is an abnormal facet    joint on the right side at C3-4.        The cervical spinal cord is of normal caliber and signal intensity.  There is increased signal intensity within the natalia, possibly representing ischemic changes. .       On the axial images, at C2-C3, there is no disc herniation, canal stenosis, cord compression or foraminal stenosis.       At C3-C4, there is a 1.5 mm ventral extradural defect secondary to bulging disc and osteophyte. This results in mild canal stenosis with no cord compression. There is mild-to-moderate bilateral foraminal stenosis.       At C4-C5, there is a 1.5 mm ventral extradural defect secondary bulging disc and uncinate process hypertrophy. This causes mild canal stenosis with no cord compression. There is moderate right and mild-to-moderate left-sided foraminal stenosis.       At C5-C6, there is a 1 mm ventral extradural defect secondary bulging disc and uncinate process hypertrophy this results in mild canal stenosis with no cord compression. There is mild-to-moderate bilateral foraminal stenosis.       At C6-C7, there is a 1.9 mm ventral extradural defect secondary bulging disc and uncinate process hypertrophy. This causes mild canal stenosis with no cord compression. There is moderate bilateral foraminal stenosis.       At C7-T1, there is a 1.2 mm ventral extradural defect secondary bulging disc and uncinate process hypertrophy.  This causes mild canal stenosis with no cord compression. There is moderate severe right and moderate left-sided foraminal stenosis.       There are no suspicious findings in the cervical soft tissues.               Impression           1. Straightening of the normal cervical lordosis, degenerative change in the vertebral body endplates adjacent to the C4-5, C5-6, C6-7 and C7 disc spaces. Abnormal facet joint on the right side at C3-4.   2. There is mild canal stenosis with no cord compression, moderate to severe right and moderate left-sided foraminal stenosis at C7-T1.   3. There is mild canal stenosis with no cord compression and moderate bilateral foraminal stenosis at C6-7.   4. There is mild canal stenosis with no cord compression, moderate right and mild-to-moderate left-sided foraminal stenosis at C4-5.   5. There is mild canal stenosis with no cord compression and mild-to-moderate bilateral foraminal stenosis at C3-4 and C5-6.   6. There is increased signal intensity in the natalia most likely representing ischemic changes.           **This report has been created using voice recognition software. It may contain minor errors which are inherent in voice recognition technology. **       Final report electronically signed by DR Josh Yen on 5/26/2022 1:07 PM       Past Medical History:   Diagnosis Date    Cancer ([facility] [address]     skin cancer    Grief reaction     Hx of blood clots     right lower leg       Past Surgical History:   Procedure Laterality Date    CARPAL TUNNEL RELEASE Left     left    FOOT SURGERY Right     FOOT SURGERY Left     HYSTERECTOMY (CERVIX STATUS UNKNOWN)  1974    AUB    JOINT REPLACEMENT Left     hip    JOINT REPLACEMENT Right     knee    KNEE SURGERY Right     LEG BIOPSY EXCISION Left 12/16/2020    EXCISION SCC LEFT LOWER LEG \"C\" AND LIPOMA LEFT ANKLE WITH FROZEN SECTION performed by Creta Prader, MD at [place] [address] Right 2009    right    SINUS SURGERY multidisciplinary approach to pain management in the patients management regimen was emphasized and discussed in great detail. PHYSICAL THERAPY: Patient is advised to see a physical therapist for gentle stretching exercises and conditioning exercises for management of pain. PSYCHOLOGY: Patient is advised to see a clinical pain psychologist, for the psychosocial aspect of pain care through coping skills, relaxation strategies, cognitive group therapy etc.   OBESITY: Patient is advised to seek nutrition consult to help in managing their weight to decrease its impact on pain. SMOKING: Impact of smoking in patient's pain was discussed and patient is counseled against smoking. The patient was also advised to continue physical therapy and stretching exercises at home and cognitive behavioral and/or group therapy.      Referrals:  None    Prescriptions Written This Visit:   None    Follow-up: Cervical MBB, follow up in office 1 week later    Owens Fleischer, DO

## 2022-07-06 ENCOUNTER — APPOINTMENT (OUTPATIENT)
Dept: GENERAL RADIOLOGY | Age: 72
End: 2022-07-06
Attending: ANESTHESIOLOGY
Payer: MEDICARE

## 2022-07-06 ENCOUNTER — HOSPITAL ENCOUNTER (OUTPATIENT)
Age: 72
Setting detail: OUTPATIENT SURGERY
Discharge: HOME | End: 2022-07-06
Attending: ANESTHESIOLOGY | Admitting: ANESTHESIOLOGY
Payer: MEDICARE

## 2022-07-06 VITALS
BODY MASS INDEX: 40.23 KG/M2 | HEART RATE: 68 BPM | DIASTOLIC BLOOD PRESSURE: 66 MMHG | RESPIRATION RATE: 16 BRPM | HEIGHT: 62 IN | WEIGHT: 218.6 LBS | OXYGEN SATURATION: 93 % | SYSTOLIC BLOOD PRESSURE: 121 MMHG | TEMPERATURE: 96.8 F

## 2022-07-06 PROCEDURE — 2709999900 HC NON-CHARGEABLE SUPPLY: Performed by: ANESTHESIOLOGY

## 2022-07-06 PROCEDURE — 99152 MOD SED SAME PHYS/QHP 5/>YRS: CPT | Performed by: ANESTHESIOLOGY

## 2022-07-06 PROCEDURE — 64491 INJ PARAVERT F JNT C/T 2 LEV: CPT | Performed by: ANESTHESIOLOGY

## 2022-07-06 PROCEDURE — 6360000002 HC RX W HCPCS: Performed by: ANESTHESIOLOGY

## 2022-07-06 PROCEDURE — 3209999900 FLUORO FOR SURGICAL PROCEDURES

## 2022-07-06 PROCEDURE — 7100000011 HC PHASE II RECOVERY - ADDTL 15 MIN: Performed by: ANESTHESIOLOGY

## 2022-07-06 PROCEDURE — 7100000010 HC PHASE II RECOVERY - FIRST 15 MIN: Performed by: ANESTHESIOLOGY

## 2022-07-06 PROCEDURE — 3600000054 HC PAIN LEVEL 3 BASE: Performed by: ANESTHESIOLOGY

## 2022-07-06 PROCEDURE — 2500000003 HC RX 250 WO HCPCS: Performed by: ANESTHESIOLOGY

## 2022-07-06 PROCEDURE — 64490 INJ PARAVERT F JNT C/T 1 LEV: CPT | Performed by: ANESTHESIOLOGY

## 2022-07-06 RX ORDER — LIDOCAINE HYDROCHLORIDE 10 MG/ML
INJECTION, SOLUTION EPIDURAL; INFILTRATION; INTRACAUDAL; PERINEURAL PRN
Status: DISCONTINUED | OUTPATIENT
Start: 2022-07-06 | End: 2022-07-06 | Stop reason: ALTCHOICE

## 2022-07-06 RX ORDER — BUPIVACAINE HYDROCHLORIDE 5 MG/ML
INJECTION, SOLUTION PERINEURAL PRN
Status: DISCONTINUED | OUTPATIENT
Start: 2022-07-06 | End: 2022-07-06 | Stop reason: ALTCHOICE

## 2022-07-06 RX ORDER — FENTANYL CITRATE 50 UG/ML
INJECTION, SOLUTION INTRAMUSCULAR; INTRAVENOUS PRN
Status: DISCONTINUED | OUTPATIENT
Start: 2022-07-06 | End: 2022-07-06 | Stop reason: ALTCHOICE

## 2022-07-06 RX ORDER — MIDAZOLAM HYDROCHLORIDE 1 MG/ML
INJECTION INTRAMUSCULAR; INTRAVENOUS PRN
Status: DISCONTINUED | OUTPATIENT
Start: 2022-07-06 | End: 2022-07-06 | Stop reason: ALTCHOICE

## 2022-07-06 ASSESSMENT — PAIN - FUNCTIONAL ASSESSMENT: PAIN_FUNCTIONAL_ASSESSMENT: 0-10

## 2022-07-06 NOTE — PRE SEDATION
[address] [address]  Pre-Sedation/Analgesia History & Physical    Pt Name: Yovani Pizarro  MRN: [id number]  YOB: 1950  Provider Performing Procedure: Dann Lyons DO   Primary Care Physician: Radu Pierce DO      MEDICAL HISTORY       has a past medical history of Cancer ([facility] [address] Grief reaction, and Hx of blood clots. SURGICAL HISTORY   has a past surgical history that includes Carpal tunnel release (Left); sinus surgery; Rotator cuff repair (Right, 2009); Foot surgery (Right); Foot surgery (Left); knee surgery (Right); joint replacement (Left); joint replacement (Right); Leg biopsy excision (Left, 12/16/2020); Hysterectomy (1974); and Ovary removal (1974). ALLERGIES   Allergies as of 06/29/2022    (No Known Allergies)       MEDICATIONS   Prior to Admission medications    Medication Sig Start Date End Date Taking? Authorizing Provider   amLODIPine (NORVASC) 10 MG tablet Take 1 tablet by mouth daily 4/18/22   Radu Pierce DO   baclofen (LIORESAL) 10 MG tablet Take 1 tablet by mouth 3 times daily 3/31/22   Radu Pierce DO   simvastatin (ZOCOR) 20 MG tablet Take 1 tablet by mouth nightly 3/31/22   Radu Pierce DO   hydroCHLOROthiazide (HYDRODIURIL) 12.5 MG tablet Take 1 tablet by mouth every morning  Patient not taking: Reported on 7/6/2022 2/7/22   Radu Pierce DO   citalopram (CELEXA) 20 MG tablet Take 1 tab PO q daily 2/7/22   Radu Pierce DO   acetaminophen (TYLENOL) 325 MG tablet Take 650 mg by mouth every 6 hours as needed for Pain    Historical Provider, MD   vitamin B-12 (CYANOCOBALAMIN) 500 MCG tablet Take 500 mcg by mouth daily    Historical Provider, MD   melatonin 3 MG TABS tablet Take 3 mg by mouth daily.     Historical Provider, MD     PHYSICAL:   Vitals:    07/06/22 1154   BP: 121/66   Pulse: 68   Resp: 16   Temp: 96.8 °F (36 °C)   SpO2: 93%     PLANNED PROCEDURE   See procedure note  SEDATION  Planned agent: Versed and Fentanyl  ASA Classification: 2  Class 1: A normal healthy patient  Class 2: Pt with mild to moderate systemic disease  Class 3: Severe systemic disease or disturbance  Class 4: Severe systemic disorders that are already life threatening. Class 5: Moribund pt with little chances of survival, for more than 24 hours. Mallampati I Airway Classification: 2    1. Pre-procedure diagnostic studies complete and results available. 2. Previous sedation/anesthesia experiences assessed. 3. The patient is an appropriate candidate to undergo the planned procedure sedation and anesthesia. (Refer to nursing sedation/analgesia documentation record)  4. Formulation and discussion of sedation/procedure plan, risks, and expectations with patient and/or responsible adult completed. 5. Patient examined immediately prior to the procedure.  (Refer to nursing sedation/analgesia documentation record)    Owens Fleischer, DO  Electronically signed 7/6/2022 at 3:02 PM

## 2022-07-06 NOTE — PROGRESS NOTES
1154: Patient to phase 2 recovery room via cart. Patient arouses easily to name. Report received from surgical RN, Juanito Subramanian. Patient's vitals obtained, see charting. 1156: Patient is denying pain and nausea at this time. IV is INT'd in her right hand. 1158: Offered drink and snack provided to the patient. Bed is in the lowest position and call light is within reach. 1210: IV removed at this time- no complications and dressing applied. Patient states she understood her discharge instructions given in pre op. 1215: Patient ambulated to the door and discharged home in stable condition with her granddaughter.

## 2022-07-06 NOTE — POST SEDATION
[facility]  Sedation/Analgesia Post Sedation Record    Pt Name: Olga Lidia Coyle  MRN: [id number]  YOB: 1950  Procedure Performed By: Dash Marlow DO  Primary Care Physician: Levy Bellamy DO    POST-PROCEDURE    Physicians/Assistants: Dash Marlow DO  Procedure Performed: See Procedure Note   Sedation/Anesthesia: Versed and Fentanyl (See procedure note for amount and duration)  Estimated Blood Loss:     0  ml  Specimens Removed: None        Complications: None           Jw De La Rosa DO  Electronically signed 7/6/2022 at 3:02 PM

## 2022-07-06 NOTE — PROCEDURES
Pre-operative Diagnosis: Cervical neck pain     Post-operative Diagnosis: Cervical neck pain     Procedure: Cervical medial branch blocks targeting RIGHT C3/C4 and C4/C5     Procedure Description:  After having obtained a signed informed consent, the patient was taken to the fluoroscopy suite and placed in the prone position. The neck was prepped with chloraprep and draped in a sterile fashion. Then, 0.5 cc of  1 % lidocaine was used to anesthetize the skin and underlying subcutaneous superficial tissues. Under fluoroscopic guidance, 22G 3.5 inch spinal needles were advanced on to the mid facet pilar of C3, C4, and C5 on the RIGHT. There were no paresthesias, heme, or CSF aspiration. Needle placement was confirmed using the AP and lateral views. Then, 0.5 cc 0.5% bupivacaine was injected. The needles were removed without any complication. The patient tolerated the procedure well and was transported to the recovery room where he was observed for 15 minutes to then be discharged in ambulatory fashion.       Procedural Complications: None  Estimated Blood Loss: 0 mL      IV sedation was used during the procedure:  - Moderate intravenous conscious sedation was supervised by Dr. Kendrick Velázquez  - The patient was independently monitored by a Registered Nurse assigned to the procedure room  - Monitoring included automated blood pressure, continuous EKG, and continuous pulse oximetry  - The detailed conscious record is permanently stored in the [place] [id number]  - The following is the conscious sedation record:  Start Time: 11:48  End Time : 12:03  Duration: 15 minutes   Medications Administered: 2 mg Versed, 50 mcg Fentanyl      Jw Voss DO  Interventional Pain Management/PM&R   [place]

## 2022-07-19 ASSESSMENT — ENCOUNTER SYMPTOMS
BACK PAIN: 0
COLOR CHANGE: 0
TROUBLE SWALLOWING: 0
CONSTIPATION: 0
SHORTNESS OF BREATH: 0
NAUSEA: 0
COUGH: 0

## 2022-07-19 NOTE — PROGRESS NOTES
Neurosurgery Follow up Note    Date:7/20/2022         Patient Name:Agueda Gonzalez     YOB: 1950     Age:71 y.o. Reason for Follow up: Follow up for 4 week follow up       Chief Complaint:   Chief Complaint   Patient presents with    Follow-up     CT, pain management         Subjective   Finesse Shin is a 80year old female who presents to the office today with CT scan and after seeing pain management. She stated that she has not had any changes in her symptoms since her last office visit. Patient stated that she is having constant cervical pain. Patient stated that she is still experiencing muscle spasms. She stated since she started taking the baclofen at night she is sleeping well. She stated that her weakness in her right upper extremity has improved. Patient stated that she is experiencing slight numbness and tingling to the right side of her neck. Patient stated that she is still experiencing headaches from pain. She stated that she is continuing performing home exercises that therapy provided her 3 times a week. She stated these exercises are not helping decrease her pain. Patient underwent cervical facet medial branch blocks right cervical 3-4 and 4-5 on 7/6/2022 by Dr. Zandra Solorio She stated she received relief for a couple days. Patient denies recent fall or trauma. Review of Systems   Review of Systems   Constitutional:  Positive for activity change and fatigue. Negative for appetite change. HENT:  Negative for trouble swallowing. Eyes:  Negative for visual disturbance. Respiratory:  Negative for cough and shortness of breath. Cardiovascular:  Negative for chest pain. Gastrointestinal:  Negative for constipation and nausea. Musculoskeletal:  Positive for myalgias, neck pain and neck stiffness. Negative for back pain and gait problem. Skin:  Negative for color change, rash and wound. Neurological:  Positive for numbness and headaches.  Negative for dizziness and weakness. Psychiatric/Behavioral:  Negative for confusion and sleep disturbance. The patient is not nervous/anxious. Medications     Current Outpatient Medications on File Prior to Visit   Medication Sig Dispense Refill    amLODIPine (NORVASC) 10 MG tablet Take 1 tablet by mouth daily 90 tablet 3    baclofen (LIORESAL) 10 MG tablet Take 1 tablet by mouth 3 times daily 90 tablet 3    simvastatin (ZOCOR) 20 MG tablet Take 1 tablet by mouth nightly 90 tablet 1    hydroCHLOROthiazide (HYDRODIURIL) 12.5 MG tablet Take 1 tablet by mouth every morning 90 tablet 3    citalopram (CELEXA) 20 MG tablet Take 1 tab PO q daily 90 tablet 3    acetaminophen (TYLENOL) 325 MG tablet Take 650 mg by mouth every 6 hours as needed for Pain      vitamin B-12 (CYANOCOBALAMIN) 500 MCG tablet Take 500 mcg by mouth daily      melatonin 3 MG TABS tablet Take 3 mg by mouth daily. No current facility-administered medications on file prior to visit. Past History    Past Medical History:   has a past medical history of Cancer ([address] Grief reaction, and Hx of blood clots. Social History:   reports that she has been smoking cigarettes. She has a 15.00 pack-year smoking history. She has never used smokeless tobacco. She reports that she does not drink alcohol and does not use drugs. Family History:   Family History   Problem Relation Age of Onset    Breast Cancer Mother 67       Physical Examination      Vitals:  /80 (Site: Right Upper Arm, Position: Sitting, Cuff Size: Large Adult)   Ht 5' 2\" (1.575 m)   Wt 218 lb (98.9 kg)   BMI 39.87 kg/m²       Physical Exam  Constitutional:       Appearance: Normal appearance. She is not ill-appearing. HENT:      Nose: Nose normal.      Mouth/Throat:      Mouth: Mucous membranes are moist.   Eyes:      Pupils: Pupils are equal, round, and reactive to light. Cardiovascular:      Rate and Rhythm: Normal rate. Pulses: Normal pulses.    Pulmonary: Effort: Pulmonary effort is normal.   Abdominal:      General: Bowel sounds are normal.   Musculoskeletal:         General: Tenderness present. Cervical back: Spasms and tenderness present. No swelling. Pain with movement present. Decreased range of motion. Back:    Skin:     General: Skin is warm and dry. Findings: No erythema. Neurological:      Mental Status: She is alert and oriented to person, place, and time. Sensory: No sensory deficit (right side of neck). Motor: No weakness. Comments: RUE5/5  LUE 5/5   Psychiatric:         Mood and Affect: Mood normal.         Behavior: Behavior normal.         Thought Content: Thought content normal.         Judgment: Judgment normal.     Neurologic Exam     Mental Status   Oriented to person, place, and time. Cranial Nerves     CN III, IV, VI   Pupils are equal, round, and reactive to light. Imaging   Imaging last 30 days:  CT CERVICAL SPINE WO CONTRAST    Result Date: 6/28/2022  1. Straightening of the normal cervical lordosis with superimposed degenerative changes resulting in mild canal and mild-to-moderate bilateral foraminal stenosis at C6-7 and C7-T1. 2. There is mild canal and bilateral foraminal stenosis at C5-6 and to a lesser extent at C4-5. 3. There is facet hypertrophy in the left side at C3-4. 4. There is calcification in the ligamentum nuchae dorsally between C4 and C5. **This report has been created using voice recognition software. It may contain minor errors which are inherent in voice recognition technology. ** Final report electronically signed by DR Marco Cope on 6/28/2022 3:24 PM         Assessment and Plan:          4 week follow up with CT of the cervical spine  CT of the cervical spine discussed with patient   Continue gentle stretches that were provided to the physical therapy  3 times a week   Continue to follow with pain management if she does not get relief from injection therapy he is recommending surgical intervention in the form of ACDF C3-4, C4-5, C5-6 with possible C6-7  Apply ice to cervical spine and shoulders 20 minutes at a time every 2 hours for pain  Follow up in 3 months to monitor progress after seeing pain management  Call office is symptoms worsen or fail to improve  A ll patient questions answered. Pt voiced understanding.  Patient instructed to call the office with any questions or concerns      Electronically signed by ROVERTO Gilbert CNP on 7/19/22 at 2:15 PM EDT

## 2022-07-20 ENCOUNTER — OFFICE VISIT (OUTPATIENT)
Dept: NEUROSURGERY | Age: 72
End: 2022-07-20
Payer: MEDICARE

## 2022-07-20 ENCOUNTER — OFFICE VISIT (OUTPATIENT)
Dept: PHYSICAL MEDICINE AND REHAB | Age: 72
End: 2022-07-20
Payer: MEDICARE

## 2022-07-20 VITALS
HEIGHT: 62 IN | SYSTOLIC BLOOD PRESSURE: 122 MMHG | DIASTOLIC BLOOD PRESSURE: 80 MMHG | BODY MASS INDEX: 40.12 KG/M2 | WEIGHT: 218 LBS

## 2022-07-20 VITALS
HEIGHT: 62 IN | DIASTOLIC BLOOD PRESSURE: 72 MMHG | SYSTOLIC BLOOD PRESSURE: 140 MMHG | BODY MASS INDEX: 40.12 KG/M2 | WEIGHT: 218 LBS

## 2022-07-20 DIAGNOSIS — M79.18 MYOFASCIAL PAIN: ICD-10-CM

## 2022-07-20 DIAGNOSIS — G89.29 CHRONIC MYOFASCIAL PAIN: ICD-10-CM

## 2022-07-20 DIAGNOSIS — M47.819 FACET ARTHROPATHY: ICD-10-CM

## 2022-07-20 DIAGNOSIS — M47.812 CERVICAL SPONDYLOSIS: Primary | ICD-10-CM

## 2022-07-20 DIAGNOSIS — M54.12 CERVICAL RADICULOPATHY: Primary | ICD-10-CM

## 2022-07-20 DIAGNOSIS — G89.4 CHRONIC PAIN SYNDROME: ICD-10-CM

## 2022-07-20 DIAGNOSIS — M79.18 CHRONIC MYOFASCIAL PAIN: ICD-10-CM

## 2022-07-20 PROCEDURE — 4004F PT TOBACCO SCREEN RCVD TLK: CPT | Performed by: NURSE PRACTITIONER

## 2022-07-20 PROCEDURE — G8417 CALC BMI ABV UP PARAM F/U: HCPCS | Performed by: NURSE PRACTITIONER

## 2022-07-20 PROCEDURE — 1123F ACP DISCUSS/DSCN MKR DOCD: CPT | Performed by: NURSE PRACTITIONER

## 2022-07-20 PROCEDURE — 99214 OFFICE O/P EST MOD 30 MIN: CPT | Performed by: NURSE PRACTITIONER

## 2022-07-20 PROCEDURE — 1123F ACP DISCUSS/DSCN MKR DOCD: CPT

## 2022-07-20 PROCEDURE — G8400 PT W/DXA NO RESULTS DOC: HCPCS | Performed by: NURSE PRACTITIONER

## 2022-07-20 PROCEDURE — G8427 DOCREV CUR MEDS BY ELIG CLIN: HCPCS | Performed by: NURSE PRACTITIONER

## 2022-07-20 PROCEDURE — 1090F PRES/ABSN URINE INCON ASSESS: CPT | Performed by: NURSE PRACTITIONER

## 2022-07-20 PROCEDURE — 99213 OFFICE O/P EST LOW 20 MIN: CPT

## 2022-07-20 PROCEDURE — 3017F COLORECTAL CA SCREEN DOC REV: CPT | Performed by: NURSE PRACTITIONER

## 2022-07-20 NOTE — PROGRESS NOTES
extradural defect secondary bulging disc and uncinate process hypertrophy this results in mild canal stenosis with no cord compression. There is mild-to-moderate bilateral foraminal stenosis. At C6-C7, there is a 1.9 mm ventral extradural defect secondary bulging disc and uncinate process hypertrophy. This causes mild canal stenosis with no cord compression. There is moderate bilateral foraminal stenosis. At C7-T1, there is a 1.2 mm ventral extradural defect secondary bulging disc and uncinate process hypertrophy. This causes mild canal stenosis with no cord compression. There is moderate severe right and moderate left-sided foraminal stenosis. There are no suspicious findings in the cervical soft tissues. Impression           1. Straightening of the normal cervical lordosis, degenerative change in the vertebral body endplates adjacent to the C4-5, C5-6, C6-7 and C7 disc spaces. Abnormal facet joint on the right side at C3-4.   2. There is mild canal stenosis with no cord compression, moderate to severe right and moderate left-sided foraminal stenosis at C7-T1.   3. There is mild canal stenosis with no cord compression and moderate bilateral foraminal stenosis at C6-7.   4. There is mild canal stenosis with no cord compression, moderate right and mild-to-moderate left-sided foraminal stenosis at C4-5.   5. There is mild canal stenosis with no cord compression and mild-to-moderate bilateral foraminal stenosis at C3-4 and C5-6.   6. There is increased signal intensity in the nat[place] most likely representing ischemic changes. **This report has been created using voice recognition software. It may contain minor errors which are inherent in voice recognition technology. **       Final report electronically signed by DR Theodore Mcpherson on 5/26/2022 1:07 PM       Past Medical History:   Diagnosis Date    Cancer ([facility] [address]     skin cancer    Grief reaction     Hx of blood clots     right lower leg       Past Surgical History:   Procedure Laterality Date    CARPAL TUNNEL RELEASE Left     left    FACET JOINT INJECTION Right 7/6/2022    CERVICAL FACET medial branch blocks  right Cervical 3-4 4-5 performed by Sharmila King DO at [facility] Right     FOOT SURGERY Left     HYSTERECTOMY (CERVIX STATUS UNKNOWN)  1974    AUB    JOINT REPLACEMENT Left     hip    JOINT REPLACEMENT Right     knee    KNEE SURGERY Right     LEG BIOPSY EXCISION Left 12/16/2020    EXCISION SCC LEFT LOWER LEG \"C\" AND LIPOMA LEFT ANKLE WITH FROZEN SECTION performed by Creta Prader, MD at [address] [place] [address] Right 2009    right    SINUS SURGERY         Family History   Problem Relation Age of Onset    Breast Cancer Mother 67       Medications & Allergies:   Current Outpatient Medications   Medication Instructions    acetaminophen (TYLENOL) 650 mg, Oral, EVERY 6 HOURS PRN    amLODIPine (NORVASC) 10 mg, Oral, DAILY    citalopram (CELEXA) 20 MG tablet Take 1 tab PO q daily    hydroCHLOROthiazide (HYDRODIURIL) 12.5 mg, Oral, EVERY MORNING    melatonin 3 mg, Oral, DAILY    simvastatin (ZOCOR) 20 mg, Oral, NIGHTLY       No Known Allergies    Review of Systems:   Constitutional: negative for weight changes or fevers  Genitourinary: negative for bowel/bladder incontinence   Musculoskeletal: positive for right neck pain  Neurological: negative for any arm weakness or numbness/tingling  Behavioral/Psych: negative for anxiety/depression   All other systems reviewed and are negative    Objective:     Vitals:    07/20/22 1123   BP: (!) 140/72       Constitutional: Pleasant, no acute distress   Head: Normocephalic, atraumatic   Eyes: Conjunctivae normal   Neck: Supple, symmetrical   Respiration: Non-labored breathing   Cardiovascular: Limbs warm and well perfused   Musculoskeletal: Full cervical ROM in all planes, increased pain with rotation, right rotation worse than left. Negative Spurling maneuver bilaterally. Increased pain with facet loading. No tenderness to palpation in cervical paraspinals or other posterior neck musculature. Neuro: Alert, oriented. CN II-XII appear grossly intact. Motor strength 5/5 SAb, EF, EE, WE, Padmaja. LT sensation intact in upper limbs. Biceps/triceps reflexes 2+ and symmetrical. Negative Chester bilaterally. Skin: no skin rashes or lesions noted   Psychological: Cooperative, no exaggerated pain behaviors     Assessment:    Diagnosis Orders   1. Cervical spondylosis  CHG FLUOR NEEDLE/CATH SPINE/PARASPINAL DX/THER ADDON    VT INJ DX/THER AGNT PARAVERT FACET JOINT, CERV/THORAC, 1ST LEVEL    VT INJ DX/THER AGNT PARAVERT FACET JOINT, CERV/THORAC, 2ND LEVEL      2. Facet arthropathy        3. Chronic myofascial pain        4. Chronic pain syndrome              Lamonte Luis is a 80 y. o.female presenting to the pain clinic for evaluation of neck pain. Patient's history and physical consistent with cervical facet mediated pain at right C3-4 and C4-5. I set her up for a cervical facet medial branch block at these levels. We also discussed that she likely has myofascial pain contributing to the pain in her neck. I have recommended trying over-the-counter Aleve and Voltaren gel as she has not tried an over-the-counter NSAID. Patient received great benefit and relief of pain with first injection of cervical facet medial branch blocks series targeting levels C3-4, C4-5, right side. We will plan for second injection targeting same levels of c3-4, c4-5 right side with plans for radiofrequency ablation in the future. She is educated to continue over-the-counter Aleve, can continue Voltaren gel as well. Plan: The following treatment recommendations and plan were discussed in detail with Lamonte Luis. Imaging:   I have reviewed patients imaging of Cervical MRI and results were discussed with patient today.      Analgesics:   Patient advised to start Aleve. Patient is advised to take as prescribed and not take on an empty stomach. Adjuvants: For chronic pain with associated depression, the patient is advised to continue Celexa. For continued chronic pain with associated musculoskeletal component, the patient is advised to continue baclofen. Interventions: In presence of neck pain and with physical exam consistent for facetal pain, the option of medial branch blocks at right C3-4 and C4-5 #2 was chosen. The risks and benefits were discussed in detail with the patient. Patient wants to proceed with the injection with Dr Mery Polk     Anticoagulation/NPO Recommendations:   Patient does need to hold Aleve x 4days prior to the procedure. Patient will need to be NPO x 8 hours prior to the procedure. We will start an IV prior to the procedure    Multidisciplinary Pain Management:   In the presence of complex, chronic, and multi-factorial pain, the importance of a multidisciplinary approach to pain management in the patients management regimen was emphasized and discussed in great detail. PHYSICAL THERAPY: Patient is advised to see a physical therapist for gentle stretching exercises and conditioning exercises for management of pain. PSYCHOLOGY: Patient is advised to see a clinical pain psychologist, for the psychosocial aspect of pain care through coping skills, relaxation strategies, cognitive group therapy etc.   OBESITY: Patient is advised to seek nutrition consult to help in managing their weight to decrease its impact on pain. SMOKING: Impact of smoking in patient's pain was discussed and patient is counseled against smoking. The patient was also advised to continue physical therapy and stretching exercises at home and cognitive behavioral and/or group therapy.      Referrals:  None    Prescriptions Written This Visit:   None    Follow-up:   After cervical medial branch block, 1 week after procedure    ROVERTO Beckwith - CNP

## 2022-08-03 ENCOUNTER — PREP FOR PROCEDURE (OUTPATIENT)
Dept: PHYSICAL MEDICINE AND REHAB | Age: 72
End: 2022-08-03

## 2022-08-08 NOTE — H&P
procedure directions / restrictions. All other questions and concerns addressed before patient discharge in ambulatory fashion. Chronic Pain/PM&R Clinic Note     Encounter Date: 7/20/22    Subjective:   Chief Complaint:   No chief complaint on file. History of Present Illness:   Yenni Gonzalez is a 80 y.o. female seen in the clinic initially on 06/29/2022 upon request from HARRIET Castle  for her history of neck pain. Patient states her pain started in August 2021. She woke up in the morning with right-sided neck pain. She thought she just slept wrong. Patient states her pain has been getting gradually worse ever since then. She states pain is aggravated with activity. She states the right side of her neck gets tight and aches. She states if she turns a certain way she feels like her neck \"catches\". She states she is sleeping okay at night but she has to use pillows to get her head in an appropriate position/alignment. She states she does have a history of headaches while these are unchanged since her neck pain is started. Patient states she has tried using ice and heat which is ineffective. She does use in a supportive neck pillow to prop her head and this seems to help with the pain. Patient denies history of falls. She does not use a cane or walker for ambulation. She denies any focal arm weakness, arm paresthesias, gait/balance disturbances. He did pursue physical therapy which was ineffective or even aggravated her neck pain. She is also tried chiropractic care and massage therapy which does seem to help. She currently lives at home with her  and her granddaughter. Today, 7/20/2022, patient presents for procedural follow up. She completed cervical MBB right side, at levels C3-4,C4-5 with Dr Angie Shields on 7/6/2022. She reports that procedure went well, no concerns.  She reports that she was able to tolerate daily acitivites, slept well and overall had great relief of pain. She states 80% of relief for 4-5 days. She denies any new numbness, tingling. She would like to proceed with the second injection of the series. She reports she is taking over the counter aleve with mild relief, and tried the voltaren gel but unsure if it helped much. She denies any new concerns    History of Interventions:   Surgery: No previous cervical surgeries  Injections: Cerevical MBB right, c-4,c4-5 - 7/6/22- relief    Current Treatment Medications:   biofreeze - effective     Historical Treatment Medications:   Baclofen 10 mg- fatigue     Imaging:  MRI cervical (05/26/2022)  Narrative   PROCEDURE: MRI CERVICAL SPINE WO CONTRAST       CLINICAL INFORMATION: Neck pain . COMPARISON: Plain radiographs dated 31 March 2022. .       TECHNIQUE: Sagittal T1, T2 and STIR sequences were obtained through the cervical spine. Axial fast and echo and gradient echo T2-weighted images were obtained. FINDINGS:       The cervical vertebral bodies are normally aligned. There is straightening of the normal cervical lordosis. . There is degenerative change in the vertebral body endplates adjacent to the C4-5, C5-6, C6-7 and C7-T1 disc spaces. .  There is an abnormal facet    joint on the right side at C3-4. The cervical spinal cord is of normal caliber and signal intensity. There is increased signal intensity within the natalia, possibly representing ischemic changes. .       On the axial images, at C2-C3, there is no disc herniation, canal stenosis, cord compression or foraminal stenosis. At C3-C4, there is a 1.5 mm ventral extradural defect secondary to bulging disc and osteophyte. This results in mild canal stenosis with no cord compression. There is mild-to-moderate bilateral foraminal stenosis. At C4-C5, there is a 1.5 mm ventral extradural defect secondary bulging disc and uncinate process hypertrophy. This causes mild canal stenosis with no cord compression.  There is moderate right and mild-to-moderate left-sided foraminal stenosis. At C5-C6, there is a 1 mm ventral extradural defect secondary bulging disc and uncinate process hypertrophy this results in mild canal stenosis with no cord compression. There is mild-to-moderate bilateral foraminal stenosis. At C6-C7, there is a 1.9 mm ventral extradural defect secondary bulging disc and uncinate process hypertrophy. This causes mild canal stenosis with no cord compression. There is moderate bilateral foraminal stenosis. At C7-T1, there is a 1.2 mm ventral extradural defect secondary bulging disc and uncinate process hypertrophy. This causes mild canal stenosis with no cord compression. There is moderate severe right and moderate left-sided foraminal stenosis. There are no suspicious findings in the cervical soft tissues. Impression           1. Straightening of the normal cervical lordosis, degenerative change in the vertebral body endplates adjacent to the C4-5, C5-6, C6-7 and C7 disc spaces. Abnormal facet joint on the right side at C3-4.   2. There is mild canal stenosis with no cord compression, moderate to severe right and moderate left-sided foraminal stenosis at C7-T1.   3. There is mild canal stenosis with no cord compression and moderate bilateral foraminal stenosis at C6-7.   4. There is mild canal stenosis with no cord compression, moderate right and mild-to-moderate left-sided foraminal stenosis at C4-5.   5. There is mild canal stenosis with no cord compression and mild-to-moderate bilateral foraminal stenosis at C3-4 and C5-6.   6. There is increased signal intensity in the natalia most likely representing ischemic changes. **This report has been created using voice recognition software. It may contain minor errors which are inherent in voice recognition technology. **       Final report electronically signed by DR Rommel Hathaway on 5/26/2022 1:07 PM       Past Medical History:   Diagnosis Musculoskeletal: Full cervical ROM in all planes, increased pain with rotation, right rotation worse than left. Negative Spurling maneuver bilaterally. Increased pain with facet loading. No tenderness to palpation in cervical paraspinals or other posterior neck musculature. Neuro: Alert, oriented. CN II-XII appear grossly intact. Motor strength 5/5 SAb, EF, EE, WE, Padmaja. LT sensation intact in upper limbs. Biceps/triceps reflexes 2+ and symmetrical. Negative Chester bilaterally. Skin: no skin rashes or lesions noted   Psychological: Cooperative, no exaggerated pain behaviors     Assessment:    Diagnosis Orders   1. Cervical spondylosis  CHG FLUOR NEEDLE/CATH SPINE/PARASPINAL DX/THER ADDON    FL INJ DX/THER AGNT PARAVERT FACET JOINT, CERV/THORAC, 1ST LEVEL    FL INJ DX/THER AGNT PARAVERT FACET JOINT, CERV/THORAC, 2ND LEVEL      2. Facet arthropathy        3. Chronic myofascial pain        4. Chronic pain syndrome              Crescencio Gonsales is a 80 y. o.female presenting to the pain clinic for evaluation of neck pain. Patient's history and physical consistent with cervical facet mediated pain at right C3-4 and C4-5. I set her up for a cervical facet medial branch block at these levels. We also discussed that she likely has myofascial pain contributing to the pain in her neck. I have recommended trying over-the-counter Aleve and Voltaren gel as she has not tried an over-the-counter NSAID. Patient received great benefit and relief of pain with first injection of cervical facet medial branch blocks series targeting levels C3-4, C4-5, right side. We will plan for second injection targeting same levels of c3-4, c4-5 right side with plans for radiofrequency ablation in the future. She is educated to continue over-the-counter Aleve, can continue Voltaren gel as well. Plan: The following treatment recommendations and plan were discussed in detail with Crescencio Gonsales.     Imaging:   I have reviewed patients imaging of Cervical MRI and results were discussed with patient today. Analgesics:   Patient advised to start Aleve. Patient is advised to take as prescribed and not take on an empty stomach. Adjuvants: For chronic pain with associated depression, the patient is advised to continue Celexa. For continued chronic pain with associated musculoskeletal component, the patient is advised to continue baclofen. Interventions: In presence of neck pain and with physical exam consistent for facetal pain, the option of medial branch blocks at right C3-4 and C4-5 #2 was chosen. The risks and benefits were discussed in detail with the patient. Patient wants to proceed with the injection with Dr Blanca Craig     Anticoagulation/NPO Recommendations:   Patient does need to hold Aleve x 4days prior to the procedure. Patient will need to be NPO x 8 hours prior to the procedure. We will start an IV prior to the procedure    Multidisciplinary Pain Management:   In the presence of complex, chronic, and multi-factorial pain, the importance of a multidisciplinary approach to pain management in the patients management regimen was emphasized and discussed in great detail. PHYSICAL THERAPY: Patient is advised to see a physical therapist for gentle stretching exercises and conditioning exercises for management of pain. PSYCHOLOGY: Patient is advised to see a clinical pain psychologist, for the psychosocial aspect of pain care through coping skills, relaxation strategies, cognitive group therapy etc.   OBESITY: Patient is advised to seek nutrition consult to help in managing their weight to decrease its impact on pain. SMOKING: Impact of smoking in patient's pain was discussed and patient is counseled against smoking. The patient was also advised to continue physical therapy and stretching exercises at home and cognitive behavioral and/or group therapy.      Referrals:  None    Prescriptions Written This Visit: None    Follow-up:   After cervical medial branch block, 1 week after procedure    Derik Gilford Kinds, DO

## 2022-08-09 ENCOUNTER — APPOINTMENT (OUTPATIENT)
Dept: GENERAL RADIOLOGY | Age: 72
End: 2022-08-09
Attending: ANESTHESIOLOGY
Payer: MEDICARE

## 2022-08-09 ENCOUNTER — HOSPITAL ENCOUNTER (OUTPATIENT)
Age: 72
Setting detail: OUTPATIENT SURGERY
Discharge: HOME | End: 2022-08-09
Attending: ANESTHESIOLOGY | Admitting: ANESTHESIOLOGY
Payer: MEDICARE

## 2022-08-09 VITALS
RESPIRATION RATE: 18 BRPM | BODY MASS INDEX: 38.27 KG/M2 | DIASTOLIC BLOOD PRESSURE: 65 MMHG | OXYGEN SATURATION: 94 % | HEART RATE: 72 BPM | HEIGHT: 63 IN | TEMPERATURE: 97.4 F | WEIGHT: 216 LBS | SYSTOLIC BLOOD PRESSURE: 121 MMHG

## 2022-08-09 PROCEDURE — 99152 MOD SED SAME PHYS/QHP 5/>YRS: CPT | Performed by: ANESTHESIOLOGY

## 2022-08-09 PROCEDURE — 2709999900 HC NON-CHARGEABLE SUPPLY: Performed by: ANESTHESIOLOGY

## 2022-08-09 PROCEDURE — 3600000054 HC PAIN LEVEL 3 BASE: Performed by: ANESTHESIOLOGY

## 2022-08-09 PROCEDURE — 64491 INJ PARAVERT F JNT C/T 2 LEV: CPT | Performed by: ANESTHESIOLOGY

## 2022-08-09 PROCEDURE — 7100000011 HC PHASE II RECOVERY - ADDTL 15 MIN: Performed by: ANESTHESIOLOGY

## 2022-08-09 PROCEDURE — 6360000002 HC RX W HCPCS: Performed by: ANESTHESIOLOGY

## 2022-08-09 PROCEDURE — 7100000010 HC PHASE II RECOVERY - FIRST 15 MIN: Performed by: ANESTHESIOLOGY

## 2022-08-09 PROCEDURE — 64490 INJ PARAVERT F JNT C/T 1 LEV: CPT | Performed by: ANESTHESIOLOGY

## 2022-08-09 PROCEDURE — 2500000003 HC RX 250 WO HCPCS: Performed by: ANESTHESIOLOGY

## 2022-08-09 PROCEDURE — 3209999900 FLUORO FOR SURGICAL PROCEDURES

## 2022-08-09 RX ORDER — BUPIVACAINE HYDROCHLORIDE 2.5 MG/ML
INJECTION, SOLUTION EPIDURAL; INFILTRATION; INTRACAUDAL PRN
Status: DISCONTINUED | OUTPATIENT
Start: 2022-08-09 | End: 2022-08-09 | Stop reason: ALTCHOICE

## 2022-08-09 RX ORDER — LIDOCAINE HYDROCHLORIDE 10 MG/ML
INJECTION, SOLUTION EPIDURAL; INFILTRATION; INTRACAUDAL; PERINEURAL PRN
Status: DISCONTINUED | OUTPATIENT
Start: 2022-08-09 | End: 2022-08-09 | Stop reason: ALTCHOICE

## 2022-08-09 RX ORDER — FENTANYL CITRATE 50 UG/ML
INJECTION, SOLUTION INTRAMUSCULAR; INTRAVENOUS PRN
Status: DISCONTINUED | OUTPATIENT
Start: 2022-08-09 | End: 2022-08-09 | Stop reason: ALTCHOICE

## 2022-08-09 RX ORDER — MIDAZOLAM HYDROCHLORIDE 1 MG/ML
INJECTION INTRAMUSCULAR; INTRAVENOUS PRN
Status: DISCONTINUED | OUTPATIENT
Start: 2022-08-09 | End: 2022-08-09 | Stop reason: ALTCHOICE

## 2022-08-09 ASSESSMENT — PAIN - FUNCTIONAL ASSESSMENT: PAIN_FUNCTIONAL_ASSESSMENT: 0-10

## 2022-08-09 ASSESSMENT — PAIN SCALES - GENERAL: PAINLEVEL_OUTOF10: 0

## 2022-08-09 NOTE — PROGRESS NOTES
Discharge instructions reviewed with patient. All questions were addressed and answered. Patient verbalized understanding of discharge plan.

## 2022-08-09 NOTE — PRE SEDATION
[facility]  Pre-Sedation/Analgesia History & Physical    Pt Name: Shane Alexander  MRN: [id number]  YOB: 1950  Provider Performing Procedure: [address] [place] [address]   Primary Care Physician: Coby Mukherjee DO      MEDICAL HISTORY       has a past medical history of Cancer ([facility] [address] Grief reaction, and Hx of blood clots. SURGICAL HISTORY   has a past surgical history that includes Carpal tunnel release (Left); sinus surgery; Rotator cuff repair (Right, 2009); Foot surgery (Right); Foot surgery (Left); knee surgery (Right); joint replacement (Left); joint replacement (Right); Leg biopsy excision (Left, 12/16/2020); Hysterectomy (1974); Ovary removal (1974); and Facet joint injection (Right, 7/6/2022). ALLERGIES   Allergies as of 07/20/2022    (No Known Allergies)       MEDICATIONS   Prior to Admission medications    Medication Sig Start Date End Date Taking? Authorizing Provider   amLODIPine (NORVASC) 10 MG tablet Take 1 tablet by mouth daily 4/18/22   Coby Mukherjee DO   simvastatin (ZOCOR) 20 MG tablet Take 1 tablet by mouth nightly 3/31/22   Coby Mukherjee DO   hydroCHLOROthiazide (HYDRODIURIL) 12.5 MG tablet Take 1 tablet by mouth every morning 2/7/22   Coby Mukherjee DO   citalopram (CELEXA) 20 MG tablet Take 1 tab PO q daily 2/7/22   Coby Mukherjee DO   acetaminophen (TYLENOL) 325 MG tablet Take 650 mg by mouth every 6 hours as needed for Pain    Historical Provider, MD   melatonin 3 MG TABS tablet Take 3 mg by mouth daily. Historical Provider, MD     PHYSICAL:   Vitals:    08/09/22 1302   BP: 121/65   Pulse: 72   Resp: 18   Temp: 97.4 °F (36.3 °C)   SpO2: 94%     PLANNED PROCEDURE   See procedure note  SEDATION  Planned agent: Versed and Fentanyl  ASA Classification: 2  Class 1: A normal healthy patient  Class 2: Pt with mild to moderate systemic disease  Class 3: Severe systemic disease or disturbance  Class 4: Severe systemic disorders that are already life threatening.   Class 5: Moribund pt with little chances of survival, for more than 24 hours. Mallampati I Airway Classification: 2    1. Pre-procedure diagnostic studies complete and results available. 2. Previous sedation/anesthesia experiences assessed. 3. The patient is an appropriate candidate to undergo the planned procedure sedation and anesthesia. (Refer to nursing sedation/analgesia documentation record)  4. Formulation and discussion of sedation/procedure plan, risks, and expectations with patient and/or responsible adult completed. 5. Patient examined immediately prior to the procedure.  (Refer to nursing sedation/analgesia documentation record)    Jacob Christiansen DO  Electronically signed 8/9/2022 at 3:18 PM

## 2022-08-09 NOTE — PROCEDURES
Pre-operative Diagnosis: Cervical neck pain     Post-operative Diagnosis: Cervical neck pain     Procedure: Cervical medial branch blocks targeting RIGHT C3/C4 and C4/C5     Procedure Description:  After having obtained a signed informed consent, the patient was taken to the fluoroscopy suite and placed in the prone position. The neck was prepped with chloraprep and draped in a sterile fashion. Then, 0.5 cc of  1 % lidocaine was used to anesthetize the skin and underlying subcutaneous superficial tissues. Under fluoroscopic guidance, 22G 3.5 inch spinal needles were advanced on to the mid facet pilar of C3, C4, and C5 on the RIGHT. There were no paresthesias, heme, or CSF aspiration. Needle placement was confirmed using the AP and lateral views. Then, 0.5 cc 0.5% bupivacaine was injected. The needles were removed without any complication. The patient tolerated the procedure well and was transported to the recovery room where he was observed for 15 minutes to then be discharged in ambulatory fashion.      Procedural Complications: None  Estimated Blood Loss: 0 mL        IV sedation was used during the procedure:  - Moderate intravenous conscious sedation was supervised by Dr. Carly Moe  - The patient was independently monitored by a Registered Nurse assigned to the procedure room  - Monitoring included automated blood pressure, continuous EKG, and continuous pulse oximetry  - The detailed conscious record is permanently stored in the [place] [id number]  - The following is the conscious sedation record:  Start Time: 12:53  End Time : 13:08  Duration: 15 minutes   Medications Administered: 1 mg Versed, 50 mcg Fentanyl        Jw Voss DO  Interventional Pain Management/PM&R  [place]

## 2022-08-09 NOTE — POST SEDATION
[address] [address]  Sedation/Analgesia Post Sedation Record    Pt Name: Lamonte Luis  MRN: [id number]  YOB: 1950  Procedure Performed By: Faby Martin DO  Primary Care Physician: Jie Rich DO    POST-PROCEDURE    Physicians/Assistants: Faby Martin DO  Procedure Performed: See Procedure Note   Sedation/Anesthesia: Versed and Fentanyl (See procedure note for amount and duration)  Estimated Blood Loss:     0  ml  Specimens Removed: None        Complications: None           Jw Garcia DO  Electronically signed 8/9/2022 at 3:18 PM

## 2022-08-09 NOTE — PROGRESS NOTES
1302- Patient arrived to Phase II via cart. Patient alert and oriented to voice, denies any pain at this izzy. VSS. Site clean, dry, intact - open to air. 1305- Patient denies any nausea or pain. Drink and snack provided. 1325- Patient continues to deny pain or nausea, tolerated snack and drink well. 1326- Sat edge of bed without any complaints. IV removed. 1327- Patient dressed independently, tolerated well. 1331- Patient discharged with , ambulatory - tolerated well.

## 2022-08-16 ENCOUNTER — OFFICE VISIT (OUTPATIENT)
Dept: PHYSICAL MEDICINE AND REHAB | Age: 72
End: 2022-08-16
Payer: MEDICARE

## 2022-08-16 VITALS
BODY MASS INDEX: 38.27 KG/M2 | HEIGHT: 63 IN | DIASTOLIC BLOOD PRESSURE: 70 MMHG | HEART RATE: 68 BPM | WEIGHT: 216 LBS | SYSTOLIC BLOOD PRESSURE: 132 MMHG

## 2022-08-16 DIAGNOSIS — M54.81 OCCIPITAL NEURALGIA OF RIGHT SIDE: ICD-10-CM

## 2022-08-16 DIAGNOSIS — M79.18 CHRONIC MYOFASCIAL PAIN: ICD-10-CM

## 2022-08-16 DIAGNOSIS — G89.4 CHRONIC PAIN SYNDROME: ICD-10-CM

## 2022-08-16 DIAGNOSIS — M47.819 FACET ARTHROPATHY: ICD-10-CM

## 2022-08-16 DIAGNOSIS — G89.29 CHRONIC MYOFASCIAL PAIN: ICD-10-CM

## 2022-08-16 DIAGNOSIS — M47.812 CERVICAL SPONDYLOSIS: Primary | ICD-10-CM

## 2022-08-16 PROCEDURE — 1090F PRES/ABSN URINE INCON ASSESS: CPT | Performed by: NURSE PRACTITIONER

## 2022-08-16 PROCEDURE — G8417 CALC BMI ABV UP PARAM F/U: HCPCS | Performed by: NURSE PRACTITIONER

## 2022-08-16 PROCEDURE — G8427 DOCREV CUR MEDS BY ELIG CLIN: HCPCS | Performed by: NURSE PRACTITIONER

## 2022-08-16 PROCEDURE — 3017F COLORECTAL CA SCREEN DOC REV: CPT | Performed by: NURSE PRACTITIONER

## 2022-08-16 PROCEDURE — G8400 PT W/DXA NO RESULTS DOC: HCPCS | Performed by: NURSE PRACTITIONER

## 2022-08-16 PROCEDURE — 99214 OFFICE O/P EST MOD 30 MIN: CPT | Performed by: NURSE PRACTITIONER

## 2022-08-16 PROCEDURE — 4004F PT TOBACCO SCREEN RCVD TLK: CPT | Performed by: NURSE PRACTITIONER

## 2022-08-16 PROCEDURE — 1123F ACP DISCUSS/DSCN MKR DOCD: CPT | Performed by: NURSE PRACTITIONER

## 2022-08-16 NOTE — PROGRESS NOTES
Chronic Pain/PM&R Clinic Note     Encounter Date: 8/16/22    Subjective:   Chief Complaint:   Chief Complaint   Patient presents with    Follow-up     F/u procedure       History of Present Illness:   Phan Bernard is a 80 y.o. female seen in the clinic initially on 06/29/2022 upon request from HARRIET Farley  for her history of neck pain. Patient states her pain started in August 2021. She woke up in the morning with right-sided neck pain. She thought she just slept wrong. Patient states her pain has been getting gradually worse ever since then. She states pain is aggravated with activity. She states the right side of her neck gets tight and aches. She states if she turns a certain way she feels like her neck \"catches\". She states she is sleeping okay at night but she has to use pillows to get her head in an appropriate position/alignment. She states she does have a history of headaches while these are unchanged since her neck pain is started. Patient states she has tried using ice and heat which is ineffective. She does use in a supportive neck pillow to prop her head and this seems to help with the pain. Patient denies history of falls. She does not use a cane or walker for ambulation. She denies any focal arm weakness, arm paresthesias, gait/balance disturbances. He did pursue physical therapy which was ineffective or even aggravated her neck pain. She is also tried chiropractic care and massage therapy which does seem to help. She currently lives at home with her  and her granddaughter. Today, 8/16/2022, patient presents for procedural follow-up. She completed confirmatory cervical medial branch blocks targeting right C3-4, C4-5 with Dr. Gato Ramos on 8/9/2022. She reports that procedures went well, she feels the neck pain has improved. She states that she feels she can turn easier, pain does not interfere with her day as much.  She has noticed the she has right side base of the skull pain, feels tight, almost like a spasm. She is not trying any ice, OTC medications. She denies vision changes, headaches or shooting pain. She would like to wait for procedures or nerve burn as her  just had knee replacement and she will be taking care of him. She would like to come back in a few weeks to discuss further once  is more independent. History of Interventions:   Surgery: No previous cervical surgeries  Injections: Cervical MBB right, c3-4,c4-5 - 7/6/22- relief. Cervical MBB right-C3-4, C4-5, 8/9/2022-relief    Current Treatment Medications:   biofreeze - effective     Historical Treatment Medications:   Baclofen 10 mg- fatigue     Imaging:  MRI cervical (05/26/2022)  Narrative   PROCEDURE: MRI CERVICAL SPINE WO CONTRAST       CLINICAL INFORMATION: Neck pain . COMPARISON: Plain radiographs dated 31 March 2022. .       TECHNIQUE: Sagittal T1, T2 and STIR sequences were obtained through the cervical spine. Axial fast and echo and gradient echo T2-weighted images were obtained. FINDINGS:       The cervical vertebral bodies are normally aligned. There is straightening of the normal cervical lordosis. . There is degenerative change in the vertebral body endplates adjacent to the C4-5, C5-6, C6-7 and C7-T1 disc spaces. .  There is an abnormal facet    joint on the right side at C3-4. The cervical spinal cord is of normal caliber and signal intensity. There is increased signal intensity within the natalia, possibly representing ischemic changes. .       On the axial images, at C2-C3, there is no disc herniation, canal stenosis, cord compression or foraminal stenosis. At C3-C4, there is a 1.5 mm ventral extradural defect secondary to bulging disc and osteophyte. This results in mild canal stenosis with no cord compression. There is mild-to-moderate bilateral foraminal stenosis.        At C4-C5, there is a 1.5 mm ventral extradural defect secondary bulging disc and uncinate process hypertrophy. This causes mild canal stenosis with no cord compression. There is moderate right and mild-to-moderate left-sided foraminal stenosis. At C5-C6, there is a 1 mm ventral extradural defect secondary bulging disc and uncinate process hypertrophy this results in mild canal stenosis with no cord compression. There is mild-to-moderate bilateral foraminal stenosis. At C6-C7, there is a 1.9 mm ventral extradural defect secondary bulging disc and uncinate process hypertrophy. This causes mild canal stenosis with no cord compression. There is moderate bilateral foraminal stenosis. At C7-T1, there is a 1.2 mm ventral extradural defect secondary bulging disc and uncinate process hypertrophy. This causes mild canal stenosis with no cord compression. There is moderate severe right and moderate left-sided foraminal stenosis. There are no suspicious findings in the cervical soft tissues. Impression           1. Straightening of the normal cervical lordosis, degenerative change in the vertebral body endplates adjacent to the C4-5, C5-6, C6-7 and C7 disc spaces. Abnormal facet joint on the right side at C3-4.   2. There is mild canal stenosis with no cord compression, moderate to severe right and moderate left-sided foraminal stenosis at C7-T1.   3. There is mild canal stenosis with no cord compression and moderate bilateral foraminal stenosis at C6-7.   4. There is mild canal stenosis with no cord compression, moderate right and mild-to-moderate left-sided foraminal stenosis at C4-5.   5. There is mild canal stenosis with no cord compression and mild-to-moderate bilateral foraminal stenosis at C3-4 and C5-6.   6. There is increased signal intensity in the natalia most likely representing ischemic changes. **This report has been created using voice recognition software. It may contain minor errors which are inherent in voice recognition technology. ** Final report electronically signed by DR Jack Humphrey on 5/26/2022 1:07 PM       Past Medical History:   Diagnosis Date    Cancer ([facility] [address]     skin cancer    Grief reaction     Hx of blood clots     right lower leg       Past Surgical History:   Procedure Laterality Date    CARPAL TUNNEL RELEASE Left     left    FACET JOINT INJECTION Right 7/6/2022    CERVICAL FACET medial branch blocks  right Cervical 3-4 4-5 performed by Sal Lozano DO at [address] Right 8/9/2022    CERVICAL FACET medial branch blocks  right Cervical 3-4 4-5 performed by Sal Lozano DO at [facility] Right     FOOT SURGERY Left     HYSTERECTOMY (CERVIX STATUS UNKNOWN)  1974    AUB    JOINT REPLACEMENT Left     hip    JOINT REPLACEMENT Right     knee    KNEE SURGERY Right     LEG BIOPSY EXCISION Left 12/16/2020    EXCISION SCC LEFT LOWER LEG \"C\" AND LIPOMA LEFT ANKLE WITH FROZEN SECTION performed by He Jenkins MD at [address] [place] [address] Right 2009    right    SINUS SURGERY         Family History   Problem Relation Age of Onset    Breast Cancer Mother 67       Medications & Allergies:   Current Outpatient Medications   Medication Instructions    acetaminophen (TYLENOL) 650 mg, Oral, EVERY 6 HOURS PRN    amLODIPine (NORVASC) 10 mg, Oral, DAILY    citalopram (CELEXA) 20 MG tablet Take 1 tab PO q daily    hydroCHLOROthiazide (HYDRODIURIL) 12.5 mg, Oral, EVERY MORNING    melatonin 3 mg, Oral, DAILY    simvastatin (ZOCOR) 20 mg, Oral, NIGHTLY       No Known Allergies    Review of Systems:   Constitutional: negative for weight changes or fevers  Genitourinary: negative for bowel/bladder incontinence   Musculoskeletal: positive for right neck pain  Neurological: negative for any arm weakness or numbness/tingling  Behavioral/Psych: negative for anxiety/depression   All other systems reviewed and are negative    Objective: Vitals:    08/16/22 0909   BP: 132/70   Pulse: 68       Constitutional: Pleasant, no acute distress   Head: Normocephalic, atraumatic   Eyes: Conjunctivae normal   Neck: Supple, symmetrical   Respiration: Non-labored breathing   Cardiovascular: Limbs warm and well perfused   Musculoskeletal: Full cervical ROM in all planes, increased pain with rotation, right rotation worse than left. Negative Spurling maneuver bilaterally. Increased pain with facet loading. No tenderness to palpation in cervical paraspinals or other posterior neck musculature. Tenderness noted on the right OCN  Neuro: Alert, oriented. CN II-XII appear grossly intact. Motor strength 5/5 SAb, EF, EE, WE, Padmaja. LT sensation intact in upper limbs. Biceps/triceps reflexes 2+ and symmetrical. Negative Chester bilaterally. Skin: no skin rashes or lesions noted   Psychological: Cooperative, no exaggerated pain behaviors     Assessment:    Diagnosis Orders   1. Cervical spondylosis        2. Facet arthropathy        3. Occipital neuralgia of right side        4. Chronic myofascial pain        5. Chronic pain syndrome                Giselle Durán is a 80 y. o.female presenting to the pain clinic for evaluation of neck pain. Patient's history and physical consistent with cervical facet mediated pain at right C3-4 and C4-5. I set her up for a cervical facet medial branch block at these levels. We also discussed that she likely has myofascial pain contributing to the pain in her neck. I have recommended trying over-the-counter Aleve and Voltaren gel as she has not tried an over-the-counter NSAID. Patient received great benefit and relief of pain with first injection of cervical facet medial branch blocks series targeting levels C3-4, C4-5, right side. We will plan for second injection targeting same levels of c3-4, c4-5 right side with plans for radiofrequency ablation in the future.   She is educated to continue over-the-counter Aleve, can continue Voltaren gel as well. Patient received great benefit of her neck pain from confirmatory cervical facet medial branch blocks, targeting right side level C3-4, C4-5. Discussed radiofrequency ablation for longer term pain control, she would like to hold off at this time due to has been having knee replacement and she is having to take care of him at home. Also discussed possible right-sided simple nerve block due to over occipital nerve block tenderness with palpation and complaints of pain. At this time due to wanting to wait, she is educated to continue over-the-counter NSAIDs, stretches, ice, heat to the area. I will see her back in 6 weeks to reevaluate and discuss further. Plan: The following treatment recommendations and plan were discussed in detail with Shane Alexander. Imaging:   I have reviewed patients imaging of Cervical MRI and results were discussed with patient today. Analgesics:   Patient advised to start Aleve. Patient is advised to take as prescribed and not take on an empty stomach. Adjuvants: For chronic pain with associated depression, the patient is advised to continue Celexa. Interventions: In presence of neck pain and with physical exam consistent for facetal pain, the option of radiofrequency ablation targeting right C3-4 and C4-5 was chosen. The risks and benefits were discussed in detail with the patient. Patient would like to wait at this time, due to  having recent surgery. We will discuss further at next follow-up visit in 6 weeks    Anticoagulation/NPO Recommendations:   Patient does need to hold Aleve x 4days prior to the procedure. Patient will need to be NPO x 8 hours prior to the procedure.   We will start an IV prior to the procedure    Multidisciplinary Pain Management:   In the presence of complex, chronic, and multi-factorial pain, the importance of a multidisciplinary approach to pain management in the patients management regimen was emphasized and discussed in great detail. PHYSICAL THERAPY: Patient is advised to see a physical therapist for gentle stretching exercises and conditioning exercises for management of pain. PSYCHOLOGY: Patient is advised to see a clinical pain psychologist, for the psychosocial aspect of pain care through coping skills, relaxation strategies, cognitive group therapy etc.   OBESITY: Patient is advised to seek nutrition consult to help in managing their weight to decrease its impact on pain. SMOKING: Impact of smoking in patient's pain was discussed and patient is counseled against smoking. The patient was also advised to continue physical therapy and stretching exercises at home and cognitive behavioral and/or group therapy.      Referrals:  None    Prescriptions Written This Visit:   None    Follow-up:   6 weeks    ROVERTO Alicea - CNP

## 2022-09-17 DIAGNOSIS — E78.5 HYPERLIPIDEMIA, UNSPECIFIED HYPERLIPIDEMIA TYPE: ICD-10-CM

## 2022-09-19 RX ORDER — SIMVASTATIN 20 MG
20 TABLET ORAL NIGHTLY
Qty: 90 TABLET | Refills: 3 | Status: SHIPPED | OUTPATIENT
Start: 2022-09-19

## 2022-10-25 ENCOUNTER — OFFICE VISIT (OUTPATIENT)
Dept: FAMILY MEDICINE CLINIC | Age: 72
End: 2022-10-25
Payer: MEDICARE

## 2022-10-25 VITALS
BODY MASS INDEX: 39.69 KG/M2 | RESPIRATION RATE: 14 BRPM | WEIGHT: 224 LBS | OXYGEN SATURATION: 95 % | HEIGHT: 63 IN | SYSTOLIC BLOOD PRESSURE: 116 MMHG | DIASTOLIC BLOOD PRESSURE: 66 MMHG | HEART RATE: 88 BPM | TEMPERATURE: 98.7 F

## 2022-10-25 DIAGNOSIS — Z23 NEED FOR IMMUNIZATION AGAINST INFLUENZA: ICD-10-CM

## 2022-10-25 DIAGNOSIS — Z00.00 MEDICARE ANNUAL WELLNESS VISIT, SUBSEQUENT: Primary | ICD-10-CM

## 2022-10-25 DIAGNOSIS — I10 HYPERTENSION, UNSPECIFIED TYPE: ICD-10-CM

## 2022-10-25 DIAGNOSIS — E78.5 HYPERLIPIDEMIA, UNSPECIFIED HYPERLIPIDEMIA TYPE: ICD-10-CM

## 2022-10-25 PROCEDURE — G8484 FLU IMMUNIZE NO ADMIN: HCPCS | Performed by: FAMILY MEDICINE

## 2022-10-25 PROCEDURE — G0439 PPPS, SUBSEQ VISIT: HCPCS | Performed by: FAMILY MEDICINE

## 2022-10-25 PROCEDURE — G0008 ADMIN INFLUENZA VIRUS VAC: HCPCS | Performed by: FAMILY MEDICINE

## 2022-10-25 PROCEDURE — 90694 VACC AIIV4 NO PRSRV 0.5ML IM: CPT | Performed by: FAMILY MEDICINE

## 2022-10-25 PROCEDURE — 3017F COLORECTAL CA SCREEN DOC REV: CPT | Performed by: FAMILY MEDICINE

## 2022-10-25 PROCEDURE — 1123F ACP DISCUSS/DSCN MKR DOCD: CPT | Performed by: FAMILY MEDICINE

## 2022-10-25 ASSESSMENT — LIFESTYLE VARIABLES
HOW OFTEN DO YOU HAVE A DRINK CONTAINING ALCOHOL: NEVER
HOW MANY STANDARD DRINKS CONTAINING ALCOHOL DO YOU HAVE ON A TYPICAL DAY: PATIENT DOES NOT DRINK

## 2022-10-25 ASSESSMENT — PATIENT HEALTH QUESTIONNAIRE - PHQ9
SUM OF ALL RESPONSES TO PHQ QUESTIONS 1-9: 0
SUM OF ALL RESPONSES TO PHQ QUESTIONS 1-9: 0
2. FEELING DOWN, DEPRESSED OR HOPELESS: 0
SUM OF ALL RESPONSES TO PHQ9 QUESTIONS 1 & 2: 0
SUM OF ALL RESPONSES TO PHQ QUESTIONS 1-9: 0
SUM OF ALL RESPONSES TO PHQ QUESTIONS 1-9: 0
1. LITTLE INTEREST OR PLEASURE IN DOING THINGS: 0

## 2022-10-25 NOTE — PROGRESS NOTES
Medicare Annual Wellness Visit    Ellie Greenfield is here for Medicare AWV    Assessment & Plan   Medicare annual wellness visit, subsequent  Need for immunization against influenza  -     Influenza, FLUAD, (age 72 y+), IM, Preservative Free, 0.5 mL    Recommendations for Preventive Services Due: see orders and patient instructions/AVS.  Recommended screening schedule for the next 5-10 years is provided to the patient in written form: see Patient Instructions/AVS.     Return for Medicare Annual Wellness Visit in 1 year. Subjective       Patient's complete Health Risk Assessment and screening values have been reviewed and are found in Flowsheets. The following problems were reviewed today and where indicated follow up appointments were made and/or referrals ordered. Positive Risk Factor Screenings with Interventions:       Tobacco Use:  Tobacco Use: High Risk    Smoking Tobacco Use: Every Day    Smokeless Tobacco Use: Never    Passive Exposure: Not on file     E-cigarette/Vaping       Questions Responses    E-cigarette/Vaping Use Never User    Start Date     Passive Exposure     Quit Date     Counseling Given     Comments           Substance Use - Tobacco Interventions:  Not ready quit, smoking 7-8 per dayu          Health Habits/Nutrition:  Physical Activity: Insufficiently Active    Days of Exercise per Week: 2 days    Minutes of Exercise per Session: 20 min     Have you lost any weight without trying in the past 3 months?: No  Body mass index: (!) 39.68  Have you seen the dentist within the past year?: (!) No  Health Habits/Nutrition Interventions:  Dental exam overdue:  patient encouraged to make appointment with his/her dentist             Objective   Vitals:    10/25/22 1243   BP: 116/66   Pulse: 88   Resp: 14   Temp: 98.7 °F (37.1 °C)   TempSrc: Oral   SpO2: 95%   Weight: 224 lb (101.6 kg)   Height: 5' 3\" (1.6 m)      Body mass index is 39.68 kg/m².              No Known Allergies  Prior to Visit Medications    Medication Sig Taking? Authorizing Provider   simvastatin (ZOCOR) 20 MG tablet Take 1 tablet by mouth nightly Yes Lorraine Branch, APRN - CNP   amLODIPine (NORVASC) 10 MG tablet Take 1 tablet by mouth daily Yes Isi Almanza DO   hydroCHLOROthiazide (HYDRODIURIL) 12.5 MG tablet Take 1 tablet by mouth every morning Yes Olivier Durbin DO   citalopram (CELEXA) 20 MG tablet Take 1 tab PO q daily Yes Olivier Durbin DO   acetaminophen (TYLENOL) 325 MG tablet Take 650 mg by mouth every 6 hours as needed for Pain Yes Historical Provider, MD   melatonin 3 MG TABS tablet Take 3 mg by mouth daily.  Yes Historical Provider, MD [place] (Including outside providers/suppliers regularly involved in providing care):   Patient Care Team:  Olivier Durbin DO as PCP - General (Family Medicine)  Olivier Durbin DO as PCP - [facility] Empaneled Provider     Reviewed and updated this visit:  Tobacco  Allergies  Meds  Med Hx  Surg Hx  Soc Hx  Fam Hx

## 2022-10-25 NOTE — PATIENT INSTRUCTIONS
Personalized Preventive Plan for Tori Soria - 10/25/2022  Medicare offers a range of preventive health benefits. Some of the tests and screenings are paid in full while other may be subject to a deductible, co-insurance, and/or copay. Some of these benefits include a comprehensive review of your medical history including lifestyle, illnesses that may run in your family, and various assessments and screenings as appropriate. After reviewing your medical record and screening and assessments performed today your provider may have ordered immunizations, labs, imaging, and/or referrals for you. A list of these orders (if applicable) as well as your Preventive Care list are included within your After Visit Summary for your review. Other Preventive Recommendations:    A preventive eye exam performed by an eye specialist is recommended every 1-2 years to screen for glaucoma; cataracts, macular degeneration, and other eye disorders. A preventive dental visit is recommended every 6 months. Try to get at least 150 minutes of exercise per week or 10,000 steps per day on a pedometer . Order or download the FREE \"Exercise & Physical Activity: Your Everyday Guide\" from [employer]. Call 6-714.544.5029 or search [employer] online. You need 8040-0127 mg of calcium and 0771-1840 IU of vitamin D per day. It is possible to meet your calcium requirement with diet alone, but a vitamin D supplement is usually necessary to meet this goal.  When exposed to the sun, use a sunscreen that protects against both UVA and UVB radiation with an SPF of 30 or greater. Reapply every 2 to 3 hours or after sweating, drying off with a towel, or swimming. Always wear a seat belt when traveling in a car. Always wear a helmet when riding a bicycle or motorcycle.

## 2022-10-25 NOTE — PROGRESS NOTES
Vaccine Information Sheet, \"Influenza - Inactivated\"  given to Emmett Arrington, or parent/legal guardian of  Emmett Arrington and verbalized understanding. Patient responses:    Have you ever had a reaction to a flu vaccine? No  Do you have an allergy to eggs, neomycin or polymixin? No  Do you have an allergy to Thimerosal, contact lens solution, or Merthiolate? No  Have you ever had Guillian Bronson Syndrome? No  Do you have any current illness? No  Do you have a temperature above 100 degrees? No  Are you pregnant? No  If pregnant, permission obtained from physician? No  Do you have an active neurological disorder? No      Flu vaccine given per order. Please see immunization tab.

## 2022-10-28 ENCOUNTER — HOSPITAL ENCOUNTER (OUTPATIENT)
Dept: MAMMOGRAPHY | Age: 72
Discharge: HOME | End: 2022-10-28
Payer: MEDICARE

## 2022-10-28 DIAGNOSIS — Z12.31 VISIT FOR SCREENING MAMMOGRAM: ICD-10-CM

## 2022-10-28 PROCEDURE — 77063 BREAST TOMOSYNTHESIS BI: CPT

## 2023-01-28 DIAGNOSIS — F41.9 ANXIETY: ICD-10-CM

## 2023-01-28 DIAGNOSIS — R53.83 OTHER FATIGUE: ICD-10-CM

## 2023-01-30 RX ORDER — CITALOPRAM 20 MG/1
TABLET ORAL
Qty: 90 TABLET | Refills: 0 | Status: SHIPPED | OUTPATIENT
Start: 2023-01-30

## 2023-01-30 RX ORDER — HYDROCHLOROTHIAZIDE 12.5 MG/1
TABLET ORAL
Qty: 90 TABLET | Refills: 0 | Status: SHIPPED | OUTPATIENT
Start: 2023-01-30

## 2023-04-27 DIAGNOSIS — F41.9 ANXIETY: ICD-10-CM

## 2023-04-27 DIAGNOSIS — R53.83 OTHER FATIGUE: ICD-10-CM

## 2023-04-27 DIAGNOSIS — I10 HYPERTENSION, UNSPECIFIED TYPE: ICD-10-CM

## 2023-04-27 RX ORDER — CITALOPRAM 20 MG/1
TABLET ORAL
Qty: 90 TABLET | Refills: 0 | Status: SHIPPED | OUTPATIENT
Start: 2023-04-27

## 2023-04-27 RX ORDER — HYDROCHLOROTHIAZIDE 12.5 MG/1
TABLET ORAL
Qty: 90 TABLET | Refills: 0 | Status: SHIPPED | OUTPATIENT
Start: 2023-04-27

## 2023-04-27 RX ORDER — AMLODIPINE BESYLATE 10 MG/1
10 TABLET ORAL DAILY
Qty: 90 TABLET | Refills: 3 | Status: SHIPPED | OUTPATIENT
Start: 2023-04-27

## 2023-04-27 NOTE — TELEPHONE ENCOUNTER
Recent Visits  Date Type Provider Dept   10/25/22 Office Visit Augusto Aw[facility]   05/02/22 Office Visit Iris Awadlerfelisa [facility]   03/31/22 Office Visit Oleg Kaur, DO [facility] [place] [facility] [place] [facility]   01/19/22 Office Visit Joyce Guerrero, APRN - CNP [facility] [place] [facility] [place] [facility]   01/12/22 Office Visit Joyce Guerrero, APRN - CNP [facility] [address]   12/08/21 Office Visit Augusto [facility]   11/23/21 Office Visit Joyce Guerrero, APRN - CNP [facility] [address] recent visits within past 540 days with a meds authorizing provider and meeting all other requirements  Future Appointments  No visits were found meeting these conditions.   Showing future appointments within next 150 days with a meds authorizing provider and meeting all other requirements      Future Appointments   Date Time Provider Betty Milton   10/31/2023  1:00 PM Joyce Guerrero APRN [address] [address]

## 2023-07-20 DIAGNOSIS — F41.9 ANXIETY: ICD-10-CM

## 2023-07-20 DIAGNOSIS — R53.83 OTHER FATIGUE: ICD-10-CM

## 2023-07-20 NOTE — TELEPHONE ENCOUNTER
Recent Visits  Date Type Provider Dept   10/25/22 Office Visit Desire Wild, [address]   05/02/22 Office Visit Desire Wild, [facility]   03/31/22 Office Visit Desire Wild, [address] [facility] recent visits within past 540 days with a meds authorizing provider and meeting all other requirements  Future Appointments  Date Type Provider Dept   11/02/23 Appointment Alessandra Lu, [address] [place] [address]   Showing future appointments within next 150 days with a meds authorizing provider and meeting all other requirements     Future Appointments   Date Time Provider [address]   11/2/2023  1:00 PM Alessandra Lu, [address] [place] [address] Dr [address]

## 2023-07-21 RX ORDER — HYDROCHLOROTHIAZIDE 12.5 MG/1
TABLET ORAL
Qty: 90 TABLET | Refills: 0 | Status: SHIPPED | OUTPATIENT
Start: 2023-07-21

## 2023-07-21 RX ORDER — CITALOPRAM 20 MG/1
TABLET ORAL
Qty: 90 TABLET | Refills: 0 | Status: SHIPPED | OUTPATIENT
Start: 2023-07-21

## 2023-10-30 ENCOUNTER — HOSPITAL ENCOUNTER (OUTPATIENT)
Dept: MAMMOGRAPHY | Age: 73
Discharge: HOME | End: 2023-10-30
Payer: MEDICARE

## 2023-10-30 VITALS — WEIGHT: 223.99 LBS | HEIGHT: 63 IN | BODY MASS INDEX: 39.69 KG/M2

## 2023-10-30 DIAGNOSIS — Z12.31 VISIT FOR SCREENING MAMMOGRAM: ICD-10-CM

## 2023-10-30 PROCEDURE — 77063 BREAST TOMOSYNTHESIS BI: CPT

## 2023-10-31 ENCOUNTER — TELEPHONE (OUTPATIENT)
Dept: FAMILY MEDICINE CLINIC | Age: 73
End: 2023-10-31

## 2023-10-31 NOTE — TELEPHONE ENCOUNTER
----- Message from Aleksander Vang DO sent at 10/31/2023  6:55 AM EDT -----  Please let pt know that labs are stable and appropriate. Let me know if questions, thanks!

## 2023-11-02 ENCOUNTER — OFFICE VISIT (OUTPATIENT)
Dept: FAMILY MEDICINE CLINIC | Age: 73
End: 2023-11-02
Payer: MEDICARE

## 2023-11-02 VITALS
SYSTOLIC BLOOD PRESSURE: 128 MMHG | RESPIRATION RATE: 18 BRPM | TEMPERATURE: 97.7 F | WEIGHT: 220.8 LBS | BODY MASS INDEX: 39.12 KG/M2 | HEART RATE: 62 BPM | DIASTOLIC BLOOD PRESSURE: 76 MMHG | OXYGEN SATURATION: 98 % | HEIGHT: 63 IN

## 2023-11-02 DIAGNOSIS — E78.5 DYSLIPIDEMIA: ICD-10-CM

## 2023-11-02 DIAGNOSIS — F17.210 CIGARETTE NICOTINE DEPENDENCE WITHOUT COMPLICATION: ICD-10-CM

## 2023-11-02 DIAGNOSIS — R73.9 HYPERGLYCEMIA: ICD-10-CM

## 2023-11-02 DIAGNOSIS — F41.9 ANXIETY: ICD-10-CM

## 2023-11-02 DIAGNOSIS — M85.80 OSTEOPENIA, UNSPECIFIED LOCATION: ICD-10-CM

## 2023-11-02 DIAGNOSIS — E66.9 OBESITY (BMI 30-39.9): ICD-10-CM

## 2023-11-02 DIAGNOSIS — I10 HYPERTENSION, ESSENTIAL: ICD-10-CM

## 2023-11-02 DIAGNOSIS — Z00.00 MEDICARE ANNUAL WELLNESS VISIT, SUBSEQUENT: Primary | ICD-10-CM

## 2023-11-02 DIAGNOSIS — Z23 NEED FOR INFLUENZA VACCINATION: ICD-10-CM

## 2023-11-02 DIAGNOSIS — Z78.0 POST-MENOPAUSE: ICD-10-CM

## 2023-11-02 PROBLEM — F17.200 NICOTINE DEPENDENCE: Status: ACTIVE | Noted: 2023-11-02

## 2023-11-02 PROBLEM — M50.30 DDD (DEGENERATIVE DISC DISEASE), CERVICAL: Chronic | Status: ACTIVE | Noted: 2023-11-02

## 2023-11-02 PROBLEM — Z85.828 HISTORY OF SKIN CANCER: Status: ACTIVE | Noted: 2023-11-02

## 2023-11-02 PROBLEM — Z85.828 HISTORY OF SKIN CANCER: Chronic | Status: ACTIVE | Noted: 2023-11-02

## 2023-11-02 PROBLEM — F17.200 NICOTINE DEPENDENCE: Chronic | Status: ACTIVE | Noted: 2023-11-02

## 2023-11-02 PROBLEM — Z86.718 HISTORY OF DEEP VEIN THROMBOSIS (DVT) OF LOWER EXTREMITY: Chronic | Status: ACTIVE | Noted: 2017-01-03

## 2023-11-02 PROBLEM — M50.30 DDD (DEGENERATIVE DISC DISEASE), CERVICAL: Status: ACTIVE | Noted: 2023-11-02

## 2023-11-02 PROCEDURE — G0008 ADMIN INFLUENZA VIRUS VAC: HCPCS | Performed by: FAMILY MEDICINE

## 2023-11-02 PROCEDURE — 3078F DIAST BP <80 MM HG: CPT | Performed by: FAMILY MEDICINE

## 2023-11-02 PROCEDURE — 3074F SYST BP LT 130 MM HG: CPT | Performed by: FAMILY MEDICINE

## 2023-11-02 PROCEDURE — G0439 PPPS, SUBSEQ VISIT: HCPCS | Performed by: FAMILY MEDICINE

## 2023-11-02 PROCEDURE — 3017F COLORECTAL CA SCREEN DOC REV: CPT | Performed by: FAMILY MEDICINE

## 2023-11-02 PROCEDURE — 90694 VACC AIIV4 NO PRSRV 0.5ML IM: CPT | Performed by: FAMILY MEDICINE

## 2023-11-02 PROCEDURE — 1123F ACP DISCUSS/DSCN MKR DOCD: CPT | Performed by: FAMILY MEDICINE

## 2023-11-02 PROCEDURE — G8484 FLU IMMUNIZE NO ADMIN: HCPCS | Performed by: FAMILY MEDICINE

## 2023-11-02 RX ORDER — HYDROCHLOROTHIAZIDE 12.5 MG/1
12.5 TABLET ORAL EVERY MORNING
Qty: 90 TABLET | Refills: 3 | Status: SHIPPED | OUTPATIENT
Start: 2023-11-02

## 2023-11-02 RX ORDER — CITALOPRAM 20 MG/1
20 TABLET ORAL DAILY
Qty: 90 TABLET | Refills: 0 | Status: SHIPPED | OUTPATIENT
Start: 2023-11-02

## 2023-11-02 RX ORDER — AMLODIPINE BESYLATE 10 MG/1
10 TABLET ORAL DAILY
Qty: 90 TABLET | Refills: 3 | Status: SHIPPED | OUTPATIENT
Start: 2023-11-02

## 2023-11-02 SDOH — ECONOMIC STABILITY: FOOD INSECURITY: WITHIN THE PAST 12 MONTHS, YOU WORRIED THAT YOUR FOOD WOULD RUN OUT BEFORE YOU GOT MONEY TO BUY MORE.: NEVER TRUE

## 2023-11-02 SDOH — ECONOMIC STABILITY: INCOME INSECURITY: HOW HARD IS IT FOR YOU TO PAY FOR THE VERY BASICS LIKE FOOD, HOUSING, MEDICAL CARE, AND HEATING?: NOT HARD AT ALL

## 2023-11-02 SDOH — ECONOMIC STABILITY: HOUSING INSECURITY
IN THE LAST 12 MONTHS, WAS THERE A TIME WHEN YOU DID NOT HAVE A STEADY PLACE TO SLEEP OR SLEPT IN A SHELTER (INCLUDING NOW)?: NO

## 2023-11-02 SDOH — ECONOMIC STABILITY: FOOD INSECURITY: WITHIN THE PAST 12 MONTHS, THE FOOD YOU BOUGHT JUST DIDN'T LAST AND YOU DIDN'T HAVE MONEY TO GET MORE.: NEVER TRUE

## 2023-11-02 ASSESSMENT — PATIENT HEALTH QUESTIONNAIRE - PHQ9
1. LITTLE INTEREST OR PLEASURE IN DOING THINGS: 0
SUM OF ALL RESPONSES TO PHQ QUESTIONS 1-9: 0
2. FEELING DOWN, DEPRESSED OR HOPELESS: 0
SUM OF ALL RESPONSES TO PHQ QUESTIONS 1-9: 0
SUM OF ALL RESPONSES TO PHQ9 QUESTIONS 1 & 2: 0
SUM OF ALL RESPONSES TO PHQ QUESTIONS 1-9: 0
SUM OF ALL RESPONSES TO PHQ QUESTIONS 1-9: 0

## 2023-11-02 NOTE — PROGRESS NOTES
Vaccine Information Sheet, \"Influenza - Inactivated\"  given to Seda Azar, or parent/legal guardian of  Seda Azar and verbalized understanding. Patient responses:    Have you ever had a reaction to a flu vaccine? No  Do you have an allergy to eggs, neomycin or polymixin? No  Do you have an allergy to Thimerosal, contact lens solution, or Merthiolate? No  Have you ever had Guillian Robes[place] Syndrome? No  Do you have any current illness? No  Do you have a temperature above 100 degrees? No  Are you pregnant? No  If pregnant, permission obtained from physician? No  Do you have an active neurological disorder? No      Flu vaccine given per order. Please see immunization tab.

## 2023-11-02 NOTE — PROGRESS NOTES
Chief Complaint   Patient presents with    Medicare AW    Follow-up     Chronic issues noted below    Establish Care     History obtained from the patient. SUBJECTIVE:  Karon Vargas is a 68 y.o. female that presents today for establishing care with new physician, etc. New patient, 1st time visit with me. Saw Dr. Lara Izquierdo prior to him leaving the practice. -HTN:    HPI:    Taking meds as prescribed ?: yes  Tolerating well ?: yes  Side Effects ?: denies  BP at home ?: <140/90  Working on TLCS ?: yes  Chest Pain/SOB/Palpitations? denies    BP Readings from Last 3 Encounters:   23 128/76   10/25/22 116/66   22 132/70       -HLD:  Was on Zocor  Stopped d/t myalgias  Better now  Due for labs  No hx of MI/CVA      -Anxiety:  On Celexa for years  Works well  Prefers to continue      -Obesity:  Working on wt loss changes      -Hyperglycemia:  Noted on prior labs with a1c 5.9 in   No f/u since then    Lab Results   Component Value Date/Time    LABA1C 5.9 2021 08:47 AM       -Osteopenia:  Noted on DEXA in 2017  High FRAX score  No falls or fxrs  Would like to get DEXA      -Smokin/3 to 1 PPD  30 days  <20 pack year hx  Not ready to quit      Age/Gender Health Maintenance    Lipid -   Lab Results   Component Value Date    CHOL 210 (H) 2022    CHOL 297 (H) 2021     Lab Results   Component Value Date    TRIG 135 2022    TRIG 182 2021     Lab Results   Component Value Date    HDL 61 2022    HDL 58 2021     Lab Results   Component Value Date    LDLCALC 122 2022    [address] [facility] 203 2021       DM Screen -   Lab Results   Component Value Date/Time    GLUCOSE 154 2020 01:06 PM     Lab Results   Component Value Date/Time    LABA1C 5.9 2021 08:47 AM       Colon Cancer Screening - multiple polyps 2021, [place]. Pt was to have a close f/u, but never did. Plans to repeat in 3 years. [place]. Lung Cancer Screening - < 20 pack year hx.

## 2023-11-20 ENCOUNTER — HOSPITAL ENCOUNTER (OUTPATIENT)
Age: 73
Discharge: HOME | End: 2023-11-20
Payer: MEDICARE

## 2023-11-20 LAB
ALBUMIN SERPL BCG-MCNC: 4.3 G/DL (ref 3.5–5.1)
ALP SERPL-CCNC: 106 U/L (ref 38–126)
ALT SERPL W/O P-5'-P-CCNC: 12 U/L (ref 11–66)
ANION GAP SERPL CALC-SCNC: 12 MEQ/L (ref 8–16)
AST SERPL-CCNC: 16 U/L (ref 5–40)
BASOPHILS ABSOLUTE: 0.1 THOU/MM3 (ref 0–0.1)
BASOPHILS NFR BLD AUTO: 1.3 %
BILIRUB SERPL-MCNC: 0.5 MG/DL (ref 0.3–1.2)
BUN SERPL-MCNC: 11 MG/DL (ref 7–22)
CALCIUM SERPL-MCNC: 9.9 MG/DL (ref 8.5–10.5)
CHLORIDE SERPL-SCNC: 100 MEQ/L (ref 98–111)
CHOLEST SERPL-MCNC: 331 MG/DL (ref 100–199)
CO2 SERPL-SCNC: 28 MEQ/L (ref 23–33)
CREAT SERPL-MCNC: 0.9 MG/DL (ref 0.4–1.2)
CREAT UR-MCNC: 200.8 MG/DL
DEPRECATED MEAN GLUCOSE BLD GHB EST-ACNC: 114 MG/DL (ref 70–126)
DEPRECATED RDW RBC AUTO: 47.2 FL (ref 35–45)
EOSINOPHIL NFR BLD AUTO: 3 %
EOSINOPHILS ABSOLUTE: 0.2 THOU/MM3 (ref 0–0.4)
ERYTHROCYTE [DISTWIDTH] IN BLOOD BY AUTOMATED COUNT: 13.7 % (ref 11.5–14.5)
GFR SERPL CREATININE-BSD FRML MDRD: > 60 ML/MIN/1.73M2
GLUCOSE SERPL-MCNC: 101 MG/DL (ref 70–108)
HBA1C MFR BLD HPLC: 5.8 % (ref 4.4–6.4)
HCT VFR BLD AUTO: 49.5 % (ref 37–47)
HDLC SERPL-MCNC: 57 MG/DL
HGB BLD-MCNC: 16.1 GM/DL (ref 12–16)
IMM GRANULOCYTES # BLD AUTO: 0.01 THOU/MM3 (ref 0–0.07)
IMM GRANULOCYTES NFR BLD AUTO: 0.2 %
LDLC SERPL CALC-MCNC: 235 MG/DL
LYMPHOCYTES ABSOLUTE: 1.5 THOU/MM3 (ref 1–4.8)
LYMPHOCYTES NFR BLD AUTO: 24.5 %
MCH RBC QN AUTO: 30.4 PG (ref 26–33)
MCHC RBC AUTO-ENTMCNC: 32.5 GM/DL (ref 32.2–35.5)
MCV RBC AUTO: 93.6 FL (ref 81–99)
MICROALBUMIN UR-MCNC: < 1.2 MG/DL
MICROALBUMIN/CREAT RATIO PNL UR: 6 MG/G (ref 0–30)
MONOCYTES ABSOLUTE: 0.4 THOU/MM3 (ref 0.4–1.3)
MONOCYTES NFR BLD AUTO: 6.6 %
NEUTROPHILS NFR BLD AUTO: 64.4 %
NRBC BLD AUTO-RTO: 0 /100 WBC
PLATELET # BLD AUTO: 238 THOU/MM3 (ref 130–400)
PMV BLD AUTO: 11.3 FL (ref 9.4–12.4)
POTASSIUM SERPL-SCNC: 4.5 MEQ/L (ref 3.5–5.2)
PROT SERPL-MCNC: 7.4 G/DL (ref 6.1–8)
RBC # BLD AUTO: 5.29 MILL/MM3 (ref 4.2–5.4)
SEGMENTED NEUTROPHILS ABSOLUTE COUNT: 4 THOU/MM3 (ref 1.8–7.7)
SODIUM SERPL-SCNC: 140 MEQ/L (ref 135–145)
TRIGL SERPL-MCNC: 196 MG/DL (ref 0–199)
TSH SERPL DL<=0.005 MIU/L-ACNC: 2.36 UIU/ML (ref 0.4–4.2)
WBC # BLD AUTO: 6.2 THOU/MM3 (ref 4.8–10.8)

## 2023-11-20 PROCEDURE — 84443 ASSAY THYROID STIM HORMONE: CPT

## 2023-11-20 PROCEDURE — 80061 LIPID PANEL: CPT

## 2023-11-20 PROCEDURE — 82043 UR ALBUMIN QUANTITATIVE: CPT

## 2023-11-20 PROCEDURE — 80053 COMPREHEN METABOLIC PANEL: CPT

## 2023-11-20 PROCEDURE — 83036 HEMOGLOBIN GLYCOSYLATED A1C: CPT

## 2023-11-20 PROCEDURE — 85025 COMPLETE CBC W/AUTO DIFF WBC: CPT

## 2023-11-20 PROCEDURE — 36415 COLL VENOUS BLD VENIPUNCTURE: CPT

## 2023-11-21 ENCOUNTER — TELEPHONE (OUTPATIENT)
Dept: FAMILY MEDICINE CLINIC | Age: 73
End: 2023-11-21

## 2023-11-21 DIAGNOSIS — E78.5 DYSLIPIDEMIA: Primary | Chronic | ICD-10-CM

## 2023-11-21 RX ORDER — ATORVASTATIN CALCIUM 20 MG/1
20 TABLET, FILM COATED ORAL DAILY
Qty: 30 TABLET | Refills: 3 | Status: SHIPPED | OUTPATIENT
Start: 2023-11-21

## 2023-11-21 NOTE — TELEPHONE ENCOUNTER
----- Message from Aleksander Vang, DO sent at 11/20/2023  8:27 PM EST -----  Please let pt know that labs are back:  -Cholesterol very high. Total is 331 and LDL, her bad cholesterol is 235. Puts her at high risk for heart attack and stroke. I know she did not tolerate Zocor, has she been on any other statin medications for cholesterol besides that? Let me know. I'd recommend we try something else, if willing, to bring this down and lower her risk of heart attack and stroke. -Rest of labs stable and appropriate. Let me know if questions, thanks!

## 2023-11-21 NOTE — TELEPHONE ENCOUNTER
Pt informed and understanding     Pt has not tried any other statin. Is agreeable to try another cholesterol medication.    Pharmacy: [facility] in [facility],Second Floor [place]

## 2023-11-21 NOTE — TELEPHONE ENCOUNTER
Ok  Let's try Atorvastatin 20mg qhs to start  Labs in 6 wks, fasting  Let me know if questions, thanks! Diagnosis Orders   1.  Dyslipidemia  atorvastatin (LIPITOR) 20 MG tablet    Lipid Panel

## 2023-12-20 ENCOUNTER — HOSPITAL ENCOUNTER (OUTPATIENT)
Age: 73
Discharge: HOME | End: 2023-12-20
Payer: MEDICARE

## 2023-12-20 LAB
ANION GAP SERPL CALC-SCNC: 12 MEQ/L (ref 8–16)
BUN SERPL-MCNC: 12 MG/DL (ref 7–22)
CALCIUM SERPL-MCNC: 9.2 MG/DL (ref 8.5–10.5)
CHLORIDE SERPL-SCNC: 102 MEQ/L (ref 98–111)
CO2 SERPL-SCNC: 26 MEQ/L (ref 23–33)
CREAT SERPL-MCNC: 0.8 MG/DL (ref 0.4–1.2)
DEPRECATED RDW RBC AUTO: 45.3 FL (ref 35–45)
ERYTHROCYTE [DISTWIDTH] IN BLOOD BY AUTOMATED COUNT: 13.2 % (ref 11.5–14.5)
GFR SERPL CREATININE-BSD FRML MDRD: > 60 ML/MIN/1.73M2
GLUCOSE SERPL-MCNC: 100 MG/DL (ref 70–108)
HCT VFR BLD AUTO: 47.4 % (ref 37–47)
HGB BLD-MCNC: 15.3 GM/DL (ref 12–16)
MCH RBC QN AUTO: 30.2 PG (ref 26–33)
MCHC RBC AUTO-ENTMCNC: 32.3 GM/DL (ref 32.2–35.5)
MCV RBC AUTO: 93.5 FL (ref 81–99)
PLATELET # BLD AUTO: 272 THOU/MM3 (ref 130–400)
PMV BLD AUTO: 11 FL (ref 9.4–12.4)
POTASSIUM SERPL-SCNC: 3.7 MEQ/L (ref 3.5–5.2)
RBC # BLD AUTO: 5.07 MILL/MM3 (ref 4.2–5.4)
SODIUM SERPL-SCNC: 140 MEQ/L (ref 135–145)
WBC # BLD AUTO: 6.7 THOU/MM3 (ref 4.8–10.8)

## 2023-12-20 PROCEDURE — 85027 COMPLETE CBC AUTOMATED: CPT

## 2023-12-20 PROCEDURE — 80048 BASIC METABOLIC PNL TOTAL CA: CPT

## 2023-12-20 PROCEDURE — 36415 COLL VENOUS BLD VENIPUNCTURE: CPT

## 2023-12-28 NOTE — PROGRESS NOTES
Patient instructed not to eat or drink anything after midnight the day before surgery.    Take heart & blood pressure medications in the morning with a small sip of water.    Please bring list of medications with the dosages & when you take them.  If you do not  have a list bring the medications bottles with you.    If having a MAC or general anesthetic you MUST have a .    Bring photo ID & insurance information.    Leave jewelry (watch ,rings, peircings) & other valuables, including extra cash, at home.    Wear comfortable clean clothing.    When showering or bathing the night before & morning of surgery please use  antibacterial soap.    Follow any instructions from Dr. Al office.

## 2024-01-02 ENCOUNTER — HOSPITAL ENCOUNTER (OUTPATIENT)
Age: 74
Discharge: HOME | End: 2024-01-02
Payer: MEDICARE

## 2024-01-02 DIAGNOSIS — E78.5 DYSLIPIDEMIA: Chronic | ICD-10-CM

## 2024-01-02 LAB
CHOLEST SERPL-MCNC: 199 MG/DL (ref 100–199)
HDLC SERPL-MCNC: 63 MG/DL
LDLC SERPL CALC-MCNC: 113 MG/DL
TRIGL SERPL-MCNC: 113 MG/DL (ref 0–199)

## 2024-01-02 PROCEDURE — 80061 LIPID PANEL: CPT

## 2024-01-02 PROCEDURE — 36415 COLL VENOUS BLD VENIPUNCTURE: CPT

## 2024-01-02 PROCEDURE — 93010 ELECTROCARDIOGRAM REPORT: CPT | Performed by: INTERNAL MEDICINE

## 2024-01-02 PROCEDURE — 93005 ELECTROCARDIOGRAM TRACING: CPT | Performed by: SPECIALIST

## 2024-01-03 ENCOUNTER — TELEPHONE (OUTPATIENT)
Dept: FAMILY MEDICINE CLINIC | Age: 74
End: 2024-01-03

## 2024-01-03 DIAGNOSIS — E78.5 DYSLIPIDEMIA: Chronic | ICD-10-CM

## 2024-01-03 LAB
EKG ATRIAL RATE: 73 BPM
EKG P AXIS: 45 DEGREES
EKG P-R INTERVAL: 160 MS
EKG Q-T INTERVAL: 388 MS
EKG QRS DURATION: 88 MS
EKG QTC CALCULATION (BAZETT): 427 MS
EKG R AXIS: -26 DEGREES
EKG T AXIS: 48 DEGREES
EKG VENTRICULAR RATE: 73 BPM

## 2024-01-03 RX ORDER — ATORVASTATIN CALCIUM 20 MG/1
20 TABLET, FILM COATED ORAL DAILY
Qty: 90 TABLET | Refills: 3 | Status: SHIPPED | OUTPATIENT
Start: 2024-01-03

## 2024-01-03 NOTE — TELEPHONE ENCOUNTER
----- Message from Raoul Gallagher, DO sent at 1/3/2024  6:37 AM EST -----  Please let pt know that lipids look much better  Con't lipitor  90 days with 3 RF sent to pharmacy  Let me know if questions, thanks!

## 2024-01-05 ENCOUNTER — ANESTHESIA (OUTPATIENT)
Dept: OPERATING ROOM | Age: 74
End: 2024-01-05
Payer: MEDICARE

## 2024-01-05 ENCOUNTER — HOSPITAL ENCOUNTER (OUTPATIENT)
Age: 74
Setting detail: OUTPATIENT SURGERY
Discharge: HOME | End: 2024-01-05
Attending: SPECIALIST | Admitting: SPECIALIST
Payer: MEDICARE

## 2024-01-05 ENCOUNTER — ANESTHESIA EVENT (OUTPATIENT)
Dept: OPERATING ROOM | Age: 74
End: 2024-01-05
Payer: MEDICARE

## 2024-01-05 VITALS
SYSTOLIC BLOOD PRESSURE: 140 MMHG | OXYGEN SATURATION: 93 % | HEIGHT: 62 IN | HEART RATE: 85 BPM | WEIGHT: 218.8 LBS | DIASTOLIC BLOOD PRESSURE: 63 MMHG | RESPIRATION RATE: 16 BRPM | BODY MASS INDEX: 40.27 KG/M2 | TEMPERATURE: 98.1 F

## 2024-01-05 DIAGNOSIS — C44.01 BASAL CELL CARCINOMA OF LIP: Primary | ICD-10-CM

## 2024-01-05 PROCEDURE — 3700000001 HC ADD 15 MINUTES (ANESTHESIA): Performed by: SPECIALIST

## 2024-01-05 PROCEDURE — 2500000003 HC RX 250 WO HCPCS: Performed by: SPECIALIST

## 2024-01-05 PROCEDURE — 3600000002 HC SURGERY LEVEL 2 BASE: Performed by: SPECIALIST

## 2024-01-05 PROCEDURE — 7100000011 HC PHASE II RECOVERY - ADDTL 15 MIN: Performed by: SPECIALIST

## 2024-01-05 PROCEDURE — 6360000002 HC RX W HCPCS: Performed by: NURSE ANESTHETIST, CERTIFIED REGISTERED

## 2024-01-05 PROCEDURE — 2500000003 HC RX 250 WO HCPCS: Performed by: NURSE ANESTHETIST, CERTIFIED REGISTERED

## 2024-01-05 PROCEDURE — 7100000010 HC PHASE II RECOVERY - FIRST 15 MIN: Performed by: SPECIALIST

## 2024-01-05 PROCEDURE — 3600000012 HC SURGERY LEVEL 2 ADDTL 15MIN: Performed by: SPECIALIST

## 2024-01-05 PROCEDURE — 6360000002 HC RX W HCPCS: Performed by: SPECIALIST

## 2024-01-05 PROCEDURE — 2709999900 HC NON-CHARGEABLE SUPPLY: Performed by: SPECIALIST

## 2024-01-05 PROCEDURE — 3700000000 HC ANESTHESIA ATTENDED CARE: Performed by: SPECIALIST

## 2024-01-05 PROCEDURE — 2580000003 HC RX 258: Performed by: NURSE ANESTHETIST, CERTIFIED REGISTERED

## 2024-01-05 RX ORDER — PROPOFOL 10 MG/ML
INJECTION, EMULSION INTRAVENOUS PRN
Status: DISCONTINUED | OUTPATIENT
Start: 2024-01-05 | End: 2024-01-05 | Stop reason: SDUPTHER

## 2024-01-05 RX ORDER — HYDROCODONE BITARTRATE AND ACETAMINOPHEN 5; 325 MG/1; MG/1
1 TABLET ORAL EVERY 6 HOURS PRN
Qty: 10 TABLET | Refills: 0 | Status: SHIPPED | OUTPATIENT
Start: 2024-01-05 | End: 2024-01-08

## 2024-01-05 RX ORDER — FENTANYL CITRATE 50 UG/ML
INJECTION, SOLUTION INTRAMUSCULAR; INTRAVENOUS PRN
Status: DISCONTINUED | OUTPATIENT
Start: 2024-01-05 | End: 2024-01-05 | Stop reason: SDUPTHER

## 2024-01-05 RX ORDER — SODIUM CHLORIDE 9 MG/ML
INJECTION, SOLUTION INTRAVENOUS CONTINUOUS PRN
Status: DISCONTINUED | OUTPATIENT
Start: 2024-01-05 | End: 2024-01-05 | Stop reason: SDUPTHER

## 2024-01-05 RX ORDER — LIDOCAINE HYDROCHLORIDE 20 MG/ML
INJECTION, SOLUTION EPIDURAL; INFILTRATION; INTRACAUDAL; PERINEURAL PRN
Status: DISCONTINUED | OUTPATIENT
Start: 2024-01-05 | End: 2024-01-05 | Stop reason: SDUPTHER

## 2024-01-05 RX ORDER — LIDOCAINE HYDROCHLORIDE AND EPINEPHRINE 10; 10 MG/ML; UG/ML
INJECTION, SOLUTION INFILTRATION; PERINEURAL PRN
Status: DISCONTINUED | OUTPATIENT
Start: 2024-01-05 | End: 2024-01-05 | Stop reason: ALTCHOICE

## 2024-01-05 RX ADMIN — FENTANYL CITRATE 50 MCG: 50 INJECTION, SOLUTION INTRAMUSCULAR; INTRAVENOUS at 11:21

## 2024-01-05 RX ADMIN — SODIUM CHLORIDE: 9 INJECTION, SOLUTION INTRAVENOUS at 11:18

## 2024-01-05 RX ADMIN — PROPOFOL 20 MG: 10 INJECTION, EMULSION INTRAVENOUS at 11:43

## 2024-01-05 RX ADMIN — PROPOFOL 50 MG: 10 INJECTION, EMULSION INTRAVENOUS at 11:24

## 2024-01-05 RX ADMIN — Medication 2000 MG: at 11:30

## 2024-01-05 RX ADMIN — PROPOFOL 20 MG: 10 INJECTION, EMULSION INTRAVENOUS at 11:39

## 2024-01-05 RX ADMIN — FENTANYL CITRATE 50 MCG: 50 INJECTION, SOLUTION INTRAMUSCULAR; INTRAVENOUS at 11:45

## 2024-01-05 RX ADMIN — LIDOCAINE HYDROCHLORIDE 60 MG: 20 INJECTION, SOLUTION EPIDURAL; INFILTRATION; INTRACAUDAL; PERINEURAL at 11:24

## 2024-01-05 ASSESSMENT — PAIN - FUNCTIONAL ASSESSMENT
PAIN_FUNCTIONAL_ASSESSMENT: 0-10
PAIN_FUNCTIONAL_ASSESSMENT: NONE - DENIES PAIN

## 2024-01-05 NOTE — PROGRESS NOTES
1205 Patient arrived to phase II via chair.  Spontaneous respiraitons even and unlabored.  Placed on monitor--VSS.  Report received from OR RN    1206 Assessment completed.  Patient is alert and oriented x4.  IV capped off-- no complications.  Patient denies pain--will monitor.  Surgical site clean, dry, and open to air.      1209 Drink provided. Pt. Denies all other needs at this time. Side rails up X2. Call light handed to pt. Chair locked.    1244 RN at bedside. Pt states readiness for discharge.    1245 Pt. Standing at bedside. With stand by assist of RN. Pt. Denies weakness or dizziness.    1247 INT removed no Complications noted.    1249 Discharge instructions reviewed with the pt. And spouse. All questions addressed. AVS handed to spouse.    1251 Pt. Getting self dressed. Remains at bedside.    1253 Family left to get private vehicle    1300 Pt. Ambulated to private vehicle in stable condition with stand by assist of RN.

## 2024-01-05 NOTE — ANESTHESIA POSTPROCEDURE EVALUATION
Department of Anesthesiology  Postprocedure Note    Patient: Agueda Castillo  MRN: [id number]  YOB: 1950  Date of evaluation: 1/5/2024    Procedure Summary       Date: 01/05/24 Room / Location: [address]    Anesthesia Start: 1116 Anesthesia Stop: 1203    Procedure: MOHS Defect Repair BCC Right Cutaneous Upper Lip (Face) Diagnosis:       BCC (basal cell carcinoma), lip      (BCC (basal cell carcinoma), lip [C44.01])    Surgeons: Francisco Al MD Responsible Provider: Juvenal Peterson MD    Anesthesia Type: MAC ASA Status: 3            Anesthesia Type: No value filed.    Veronique Phase I:      Veronique Phase II: Veronique Score: 10    Anesthesia Post Evaluation    Patient location during evaluation: bedside  Patient participation: complete - patient participated  Level of consciousness: awake  Airway patency: patent  Nausea & Vomiting: no vomiting and no nausea  Cardiovascular status: hemodynamically stable  Respiratory status: acceptable  Hydration status: stable  Pain management: adequate    No notable events documented.

## 2024-01-05 NOTE — OP NOTE
Operative Note    Patient name: Agueda Castillo             Medical Record Number: [id number]    Primary Care Physician: Raoul Gallagher,      1950    Date of Procedure: 2024    Pre-operative Diagnosis: 1cm2 defect of right upper lip s/p MOHS for basal cell carcinoma    Post-operative Diagnosis: Same    Procedure Performed: Repair of right upper lip defect with an adjacent tissue transfer (7 cm2).    Surgeons/Assistants: Dr. Francisco Al MD /Dionne Mims PA-C    Estimated Blood Loss: 3ml     Complications: none immediately appreciated    Procedure:    With the patient lying in the supine position and under adequate anesthesia per the anesthesia team, the area was anesthetized with a total of 11 ml of 1% Lidocaine 1:100,000 with epinephrine solution.  The area was then prepped and draped in the standard surgical fashion.  There was a 1cm2 defect, which could not be closed primarily due to distortion of lip.  Therefore, a 7cm2 (3cm x 2cm + 1cm2) sum of defect/adjacent tissue transfer laterally based rotation flap was then designed, back cut 3cm along the vermilion border, elevated 2cm and inset with 4-0 Monocryl suture placed in interrupted buried fashion. The Burow's triangles were resected with final closure being 4-0 chromic along the vermilion border closure while 5-0 fast absorbing was used to closed the vertical anterior lip skin closure that extended to the nasal base.  Bacitracin was applied as final dressing.  The patient tolerated the procedure quite well and remained hemodynamically stable throughout the procedure and was quite comfortable throughout the operative course.    Clinical staging for cancer cases:  Ct  Cn  Cm    Francisco Al MD  Electronically signed by me on 2024 at 12:16 PMOperative Note      Patient: Agueda Castillo  YOB: 1950  MRN: [id number]    Date of Procedure: 2024    Pre-Op Diagnosis Codes:     * BCC (basal cell carcinoma), lip  [C44.01]    Post-Op Diagnosis: Same       Procedure(s):  MOHS Defect Repair BCC Right Cutaneous Upper Lip    Surgeon(s):  Francisco Al MD    Assistant:   Physician Assistant: Dionne Mims PA-C    Anesthesia: Monitor Anesthesia Care    Estimated Blood Loss (mL): Minimal    Complications: None    Specimens:   * No specimens in log *    Implants:  * No implants in log *      Drains: * No LDAs found *    Findings: 1cm2 defect of right upper lip s/p MOHS for basal cell carcinoma          Detailed Description of Procedure:   Repair of right upper lip defect with an adjacent tissue transfer (7 cm2).    Electronically signed by Francisco Al MD on 1/5/2024 at 12:16 PM

## 2024-01-05 NOTE — DISCHARGE INSTRUCTIONS
POST OPERATIVE INSTRUCTION SHEET  SKIN TUMOR/LESION REMOVAL          Activity:    No strenuous activity for 48 hours  No activity that stresses the suture closure/incision  Clear liquids for 48 hours (Sip from a cup: do not use a straw)  ABSOLUTELY NO NICOTINE OF ANY TYPE    Wound Care:  If the incision is open to air, you should gently wash with soap and water using fingertips then apply Bacitracin 3 times a day for 4 days  If Steri-strips in place, you should leave them over incision until they fall off.  If they fall off prior to your follow up appointment, cover incision with a band-aid  If Dermabond used, you may shower 24 hours post operation, but do not scrub, rub or pick at adhesive glue  Keep all incisions clean  You may shower 36 hours after the operation    Limitations:  No swimming, hot tub, sauna or soaking in a bathtub    Prescriptions:  Take exactly as prescribed    Follow-Up:  Follow up in the office February 1, 2024 at 1:30.      Notify our office if you experience any of the following:   Develop a fever (temperature is greater than 100.5F)   Develop redness greater than 1 cm around incision or red streaks up extremity   Have any excess bleeding/ increased drainage or swelling at the incision site    *Note:  Your pathology results will be reviewed with you at your scheduled follow-up appointment.        How Do you Prevent Surgical Site Infections?       *HAND WASHING     *STOP SHAVING   Wash your hands multiple times daily  Do not shave incisional area 48 hours before   with soap for 20 seconds.    or until 2 weeks after surgery.  This can   Before eating and wound care.   cause tiny cuts in the skin which can lead to infection.   After using the bathroom or touching pets.                                    *BALANCE      Times of activity and rest.      Stay away from people who may be sick.             *QUIT SMOKING      Cigarette smoking leads to slow wound      healing.                   *WASH  YOUR BODY      Follow your doctor's instructions on washing the night before and the day of your surgery.       You may use products like:  Dial antibacterial soap, Hibiclens, Mily-hex.         Do not share personal items such as towels, wash cloths, or toothbrush.       *BLOOD SUGAR CONTROL                  Lower blood sugars help to prevent                          infections, scarring, and slow wound                          healing.  Blood sugar goal less than 200.                   *EAT HEALTHY       Eat plenty of protein,        vegetables, and fruits.                                       Limit sugars and processed foods.                     *BED LINENS      *DRESSING CHANGE        Make sure your bed linens are freshly   Follow your discharge instructions for wound        washed.  NO pets in the bed.  Your animals care and bathing:       carry bacteria in their fur, skin or feathers  ~Keep your dressing clean and dry       which can enter site, causing infection.  ~Clean and washed clothes are important                                                            ~Call the doctor if you develop a fever,                                                       your incision has redness, an odor or                                                             yellowish/greenish drainage.

## 2024-01-05 NOTE — ANESTHESIA PRE PROCEDURE
Department of Anesthesiology  Preprocedure Note       Name:  Agueda Castillo   Age:  73 y.o.  :  1950                                          MRN:  [id number]         Date:  2024      Surgeon: Surgeon(s):  Francisco Al MD    Procedure: Procedure(s):  MOHS Defect Repair BCC Right Cutaneous Upper Lip    Medications prior to admission:   Prior to Admission medications    Medication Sig Start Date End Date Taking? Authorizing Provider   atorvastatin (LIPITOR) 20 MG tablet Take 1 tablet by mouth daily 1/3/24   Raoul Gallagher,    Calcium Carb-Cholecalciferol 500-10 MG-MCG TABS Take 1 tablet by mouth 2 times daily 23   Raoul Gallagher,    citalopram (CELEXA) 20 MG tablet Take 1 tablet by mouth daily 23   Raoul Gallagher,    hydroCHLOROthiazide (HYDRODIURIL) 12.5 MG tablet Take 1 tablet by mouth every morning 23   Raoul Gallagher, DO   amLODIPine (NORVASC) 10 MG tablet Take 1 tablet by mouth daily 23   Raoul Gallagher, DO   melatonin 3 MG TABS tablet Take 10 mg by mouth nightly    Provider, MD Brian       Current medications:    Current Facility-Administered Medications   Medication Dose Route Frequency Provider Last Rate Last Admin   • ceFAZolin (ANCEF) 2000 mg in 0.9% sodium chloride 50 mL IVPB  2,000 mg IntraVENous Once Francisco Al MD           Allergies:  No Known Allergies    Problem List:    Patient Active Problem List   Diagnosis Code   • Anxiety F41.9   • Primary osteoarthritis of right knee M17.11   • History of deep vein thrombosis (DVT) of lower extremity Z86.718   • DDD (degenerative disc disease), cervical M50.30   • Dyslipidemia E78.5   • History of skin cancer Z85.828   • Hypertension, essential I10   • Nicotine dependence F17.200   • Obesity (BMI 30-39.9) E66.9   • Osteopenia M85.80       Past Medical History:        Diagnosis Date   • Anxiety 2015   • Cancer (HCC)     skin   • DDD (degenerative disc disease), cervical    •

## 2024-01-05 NOTE — H&P
[facility]  History and Physical Update    Pt Name: Agueda Castillo  MRN: [id number]  YOB: 1950  Date of evaluation: 1/5/2024    I have examined the patient and reviewed the H&P/Consult and there are no changes to the patient or plans.      Francsico Al MD  Electronically signed 1/5/2024 at 10:06 AM  [facility]  History and Physical Update    Pt Name: Agueda Castillo  MRN: [id number]  YOB: 1950  Date of evaluation: 1/5/2024    I have examined the patient and reviewed the H&P/Consult and there are no changes to the patient or plans.      Francisco Al MD  Electronically signed 1/5/2024 at 10:06 AM

## 2024-02-02 DIAGNOSIS — F41.9 ANXIETY: ICD-10-CM

## 2024-02-02 RX ORDER — CITALOPRAM 20 MG/1
20 TABLET ORAL DAILY
Qty: 90 TABLET | Refills: 3 | Status: SHIPPED | OUTPATIENT
Start: 2024-02-02

## 2024-02-02 NOTE — TELEPHONE ENCOUNTER
Recent Visits  Date Type Provider Dept   11/02/23 Office Visit Raoul Gallagher, DO [facility]   10/25/22 Office Visit Naren Almanza, DO [facility]   Showing recent visits within past 540 days with a meds authorizing provider and meeting all other requirements  Future Appointments  No visits were found meeting these conditions.  Showing future appointments within next 150 days with a meds authorizing provider and meeting all other requirements

## 2024-02-16 DIAGNOSIS — I10 HYPERTENSION, ESSENTIAL: ICD-10-CM

## 2024-02-19 RX ORDER — HYDROCHLOROTHIAZIDE 12.5 MG/1
12.5 TABLET ORAL EVERY MORNING
Qty: 90 TABLET | Refills: 3 | Status: SHIPPED | OUTPATIENT
Start: 2024-02-19

## 2024-02-19 NOTE — TELEPHONE ENCOUNTER
Recent Visits  Date Type Provider Dept   11/02/23 Office Visit Raoul Gallagher, DO [facility]   10/25/22 Office Visit Naren Almanza, DO [facility]   Showing recent visits within past 540 days with a meds authorizing provider and meeting all other requirements  Future Appointments  No visits were found meeting these conditions.  Showing future appointments within next 150 days with a meds authorizing provider and meeting all other requirements

## 2024-10-29 ENCOUNTER — HOSPITAL ENCOUNTER (OUTPATIENT)
Dept: MAMMOGRAPHY | Age: 74
Discharge: HOME | End: 2024-10-29
Payer: MEDICARE

## 2024-10-29 VITALS — WEIGHT: 200 LBS | BODY MASS INDEX: 36.8 KG/M2 | HEIGHT: 62 IN

## 2024-10-29 DIAGNOSIS — Z12.39 BREAST SCREENING: ICD-10-CM

## 2024-10-29 PROCEDURE — 77063 BREAST TOMOSYNTHESIS BI: CPT

## 2024-10-30 ENCOUNTER — TELEPHONE (OUTPATIENT)
Dept: FAMILY MEDICINE CLINIC | Age: 74
End: 2024-10-30

## 2024-10-30 NOTE — TELEPHONE ENCOUNTER
----- Message from Dr. Raoul Gallagher, DO sent at 10/30/2024  7:04 AM EDT -----  Please let pt know that mammogram is normal. Let me know if questions, thanks!

## 2024-11-05 NOTE — PROGRESS NOTES
Chief Complaint   Patient presents with    Medicare AWV    Obesity    Follow-up     Chronic issues noted below     History obtained from the patient.    SUBJECTIVE:  Agueda Castillo is a 74 y.o. female that presents today for     -Obesity:  Wts up  On calorie restricted diet  Exercising daily  Wts going up, not down  Inc arthritis pain  Would like to try Wegovy      -HTN:    HPI:    Taking meds as prescribed ?: yes  Tolerating well ?: yes  Side Effects ?: denies  BP at home ?: <140/90  Working on TLCS ?: yes  Chest Pain/SOB/Palpitations? denies    BP Readings from Last 3 Encounters:   24 120/82   24 (!) 140/63   23 128/76       -HLD:    HPI:    Taking meds as prescribed ?: yes  Tolerating well ?: yes  Side Effects ?: no  Muscle Pain?: no  Working on TLCS ?: yes      -Anxiety:  On Celexa for years  Works well  Prefers to continue      -Hyperglycemia:  Noted on prior labs with a1c 5.9 in  and 5.8 in 2023  Due for f/u labs    Lab Results   Component Value Date/Time    LABA1C 5.8 2023 09:39 AM    LABA1C 5.9 2021 08:47 AM       -Osteopenia:  Low FRAX score on DEXA DEC 2023  On fredo with D      -Smokin/3 to 1/2 PPD  30 years  <20 pack year hx  Not ready to quit      Age/Gender Health Maintenance    Lipid -   Lab Results   Component Value Date    CHOL 199 2024    CHOL 331 (H) 2023    CHOL 210 (H) 2022     Lab Results   Component Value Date    TRIG 113 2024    TRIG 196 2023    TRIG 135 2022     Lab Results   Component Value Date    HDL 63 2024    HDL 57 2023    HDL 61 2022     Lab Results   Component Value Date     2024     2023     2022       DM Screen -   Lab Results   Component Value Date/Time    GLUCOSE 100 2023 11:22 AM     Lab Results   Component Value Date/Time    LABA1C 5.8 2023 09:39 AM    LABA1C 5.9 2021 08:47 AM       Colon Cancer Screening - multiple polyps

## 2024-11-06 ENCOUNTER — OFFICE VISIT (OUTPATIENT)
Dept: FAMILY MEDICINE CLINIC | Age: 74
End: 2024-11-06

## 2024-11-06 VITALS
BODY MASS INDEX: 43.02 KG/M2 | OXYGEN SATURATION: 96 % | SYSTOLIC BLOOD PRESSURE: 120 MMHG | WEIGHT: 233.8 LBS | DIASTOLIC BLOOD PRESSURE: 82 MMHG | RESPIRATION RATE: 20 BRPM | TEMPERATURE: 97 F | HEIGHT: 62 IN | HEART RATE: 84 BPM

## 2024-11-06 DIAGNOSIS — E66.01 MORBID OBESITY WITH BMI OF 40.0-44.9, ADULT: ICD-10-CM

## 2024-11-06 DIAGNOSIS — Z23 NEED FOR INFLUENZA VACCINATION: ICD-10-CM

## 2024-11-06 DIAGNOSIS — R73.9 HYPERGLYCEMIA: ICD-10-CM

## 2024-11-06 DIAGNOSIS — Z00.00 MEDICARE ANNUAL WELLNESS VISIT, SUBSEQUENT: Primary | ICD-10-CM

## 2024-11-06 DIAGNOSIS — I10 HYPERTENSION, ESSENTIAL: Chronic | ICD-10-CM

## 2024-11-06 DIAGNOSIS — F17.210 CIGARETTE NICOTINE DEPENDENCE WITHOUT COMPLICATION: ICD-10-CM

## 2024-11-06 DIAGNOSIS — F41.9 ANXIETY: Chronic | ICD-10-CM

## 2024-11-06 DIAGNOSIS — E78.5 DYSLIPIDEMIA: Chronic | ICD-10-CM

## 2024-11-06 DIAGNOSIS — M85.80 OSTEOPENIA, UNSPECIFIED LOCATION: ICD-10-CM

## 2024-11-06 RX ORDER — SEMAGLUTIDE 1 MG/.5ML
1 INJECTION, SOLUTION SUBCUTANEOUS
Qty: 2 ML | Refills: 0 | Status: SHIPPED | OUTPATIENT
Start: 2025-01-01 | End: 2025-01-23

## 2024-11-06 RX ORDER — SEMAGLUTIDE 0.5 MG/.5ML
0.5 INJECTION, SOLUTION SUBCUTANEOUS
Qty: 2 ML | Refills: 0 | Status: SHIPPED | OUTPATIENT
Start: 2024-12-04 | End: 2024-12-26

## 2024-11-06 RX ORDER — SEMAGLUTIDE 0.25 MG/.5ML
0.25 INJECTION, SOLUTION SUBCUTANEOUS
Qty: 2 ML | Refills: 0 | Status: SHIPPED | OUTPATIENT
Start: 2024-11-06 | End: 2024-11-28

## 2024-11-06 RX ORDER — AMLODIPINE BESYLATE 10 MG/1
10 TABLET ORAL DAILY
Qty: 90 TABLET | Refills: 3 | Status: SHIPPED | OUTPATIENT
Start: 2024-11-06

## 2024-11-06 RX ORDER — ATORVASTATIN CALCIUM 20 MG/1
20 TABLET, FILM COATED ORAL DAILY
Qty: 90 TABLET | Refills: 3 | Status: SHIPPED | OUTPATIENT
Start: 2024-11-06

## 2024-11-06 RX ORDER — SEMAGLUTIDE 2.4 MG/.75ML
2.4 INJECTION, SOLUTION SUBCUTANEOUS
Qty: 3 ML | Refills: 5 | Status: SHIPPED | OUTPATIENT
Start: 2025-02-26

## 2024-11-06 RX ORDER — SEMAGLUTIDE 1.7 MG/.75ML
1.7 INJECTION, SOLUTION SUBCUTANEOUS
Qty: 3 ML | Refills: 0 | Status: SHIPPED | OUTPATIENT
Start: 2025-01-29 | End: 2025-02-20

## 2024-11-06 SDOH — ECONOMIC STABILITY: INCOME INSECURITY: HOW HARD IS IT FOR YOU TO PAY FOR THE VERY BASICS LIKE FOOD, HOUSING, MEDICAL CARE, AND HEATING?: NOT HARD AT ALL

## 2024-11-06 SDOH — ECONOMIC STABILITY: FOOD INSECURITY: WITHIN THE PAST 12 MONTHS, YOU WORRIED THAT YOUR FOOD WOULD RUN OUT BEFORE YOU GOT MONEY TO BUY MORE.: NEVER TRUE

## 2024-11-06 SDOH — ECONOMIC STABILITY: FOOD INSECURITY: WITHIN THE PAST 12 MONTHS, THE FOOD YOU BOUGHT JUST DIDN'T LAST AND YOU DIDN'T HAVE MONEY TO GET MORE.: NEVER TRUE

## 2024-11-06 ASSESSMENT — PATIENT HEALTH QUESTIONNAIRE - PHQ9
SUM OF ALL RESPONSES TO PHQ9 QUESTIONS 1 & 2: 2
SUM OF ALL RESPONSES TO PHQ QUESTIONS 1-9: 2
2. FEELING DOWN, DEPRESSED OR HOPELESS: SEVERAL DAYS
1. LITTLE INTEREST OR PLEASURE IN DOING THINGS: SEVERAL DAYS
SUM OF ALL RESPONSES TO PHQ QUESTIONS 1-9: 2

## 2024-11-06 NOTE — PROGRESS NOTES
Vaccine Information Sheet, \"Influenza - Inactivated\"  given to Agueda Castillo, or parent/legal guardian of  Agueda Castillo and verbalized understanding.    Patient responses:    Have you ever had a reaction to a flu vaccine? No  Do you have an allergy to eggs, neomycin or polymixin?  No  Do you have an allergy to Thimerosal, contact lens solution, or Merthiolate? No  Have you ever had Guillian Cresco Syndrome?  No  Do you have any current illness?  No  Do you have a temperature above 100 degrees? No  Are you pregnant? No  If pregnant, permission obtained from physician? No  Do you have an active neurological disorder? No      Flu vaccine given per order. Please see immunization tab.

## 2024-11-06 NOTE — PROGRESS NOTES
Immunization(s) given during visit:    Immunizations Administered       Name Date Dose Route    Influenza, FLUAD, (age 65 y+), IM, Trivalent PF, 0.5mL 11/6/2024 0.5 mL Intramuscular    Site: Deltoid- Right    Lot: [id number]    NDC: 90889-998-27            Most recent Vaccine Information Sheet dated 8/6/21 given to pt

## 2024-11-06 NOTE — PATIENT INSTRUCTIONS
is usually necessary to meet this goal.  When exposed to the sun, use a sunscreen that protects against both UVA and UVB radiation with an SPF of 30 or greater. Reapply every 2 to 3 hours or after sweating, drying off with a towel, or swimming.  Always wear a seat belt when traveling in a car. Always wear a helmet when riding a bicycle or motorcycle.   Yes

## 2024-11-11 ENCOUNTER — TELEPHONE (OUTPATIENT)
Dept: FAMILY MEDICINE CLINIC | Age: 74
End: 2024-11-11

## 2024-11-11 NOTE — TELEPHONE ENCOUNTER
Pt states that she has not picked it up the wegovy since the pharmacy has not called her yet.   She will call them today

## 2024-11-11 NOTE — TELEPHONE ENCOUNTER
----- Message from Dr. Raoul Gallagher, DO sent at 11/11/2024  7:12 AM EST -----  Please call pt and see if she is able to  her Wegovy.   Let me know, thanks!

## 2024-11-14 ENCOUNTER — TELEPHONE (OUTPATIENT)
Dept: FAMILY MEDICINE CLINIC | Age: 74
End: 2024-11-14

## 2024-11-14 DIAGNOSIS — E66.01 MORBID OBESITY WITH BMI OF 40.0-44.9, ADULT: Primary | ICD-10-CM

## 2024-11-14 NOTE — TELEPHONE ENCOUNTER
----- Message from Dr. Raoul Gallagher, DO sent at 11/14/2024  7:05 AM EST -----  Please call pt and see if she is able to  her Wegovy.   Let me know, thanks!

## 2024-11-14 NOTE — TELEPHONE ENCOUNTER
Received Denial for Wegovy as medicare does not cover weight loss medications     Denial attached

## 2024-11-14 NOTE — TELEPHONE ENCOUNTER
Called and spoke to patient and states she is baking pies right now and will go to pharmacy and check afterwards.

## 2024-11-18 NOTE — TELEPHONE ENCOUNTER
Rx in [facility] tray to fax    Pt should be a call in the next few days from the compounding pharmacy.     Let me know if questions, thanks!     Diagnosis Orders   1. Morbid obesity with BMI of 40.0-44.9, adult          Future Appointments   Date Time Provider Department Center   2/6/2025 11:00 AM Raoul Gallagher, DO [facility]

## 2024-11-19 ENCOUNTER — HOSPITAL ENCOUNTER (OUTPATIENT)
Age: 74
Discharge: HOME | End: 2024-11-19
Payer: MEDICARE

## 2024-11-19 DIAGNOSIS — I10 HYPERTENSION, ESSENTIAL: Chronic | ICD-10-CM

## 2024-11-19 DIAGNOSIS — R73.9 HYPERGLYCEMIA: ICD-10-CM

## 2024-11-19 LAB
ALBUMIN SERPL BCG-MCNC: 4 G/DL (ref 3.5–5.1)
ALP SERPL-CCNC: 90 U/L (ref 38–126)
ALT SERPL W/O P-5'-P-CCNC: 12 U/L (ref 11–66)
ANION GAP SERPL CALC-SCNC: 12 MEQ/L (ref 8–16)
AST SERPL-CCNC: 18 U/L (ref 5–40)
BASOPHILS ABSOLUTE: 0.1 THOU/MM3 (ref 0–0.1)
BASOPHILS NFR BLD AUTO: 1.4 %
BILIRUB SERPL-MCNC: 0.5 MG/DL (ref 0.3–1.2)
BUN SERPL-MCNC: 13 MG/DL (ref 7–22)
CALCIUM SERPL-MCNC: 10 MG/DL (ref 8.5–10.5)
CHLORIDE SERPL-SCNC: 100 MEQ/L (ref 98–111)
CHOLEST SERPL-MCNC: 171 MG/DL (ref 100–199)
CO2 SERPL-SCNC: 27 MEQ/L (ref 23–33)
CREAT SERPL-MCNC: 1 MG/DL (ref 0.4–1.2)
CREAT UR-MCNC: 261.1 MG/DL
DEPRECATED MEAN GLUCOSE BLD GHB EST-ACNC: 117 MG/DL (ref 70–126)
DEPRECATED RDW RBC AUTO: 46.7 FL (ref 35–45)
EOSINOPHIL NFR BLD AUTO: 3.3 %
EOSINOPHILS ABSOLUTE: 0.2 THOU/MM3 (ref 0–0.4)
ERYTHROCYTE [DISTWIDTH] IN BLOOD BY AUTOMATED COUNT: 13.8 % (ref 11.5–14.5)
GFR SERPL CREATININE-BSD FRML MDRD: 59 ML/MIN/1.73M2
GLUCOSE SERPL-MCNC: 98 MG/DL (ref 70–108)
HBA1C MFR BLD HPLC: 5.9 % (ref 4.4–6.4)
HCT VFR BLD AUTO: 45.9 % (ref 37–47)
HDLC SERPL-MCNC: 58 MG/DL
HGB BLD-MCNC: 14.9 GM/DL (ref 12–16)
IMM GRANULOCYTES # BLD AUTO: 0.01 THOU/MM3 (ref 0–0.07)
IMM GRANULOCYTES NFR BLD AUTO: 0.2 %
LDLC SERPL CALC-MCNC: 87 MG/DL
LYMPHOCYTES ABSOLUTE: 1.5 THOU/MM3 (ref 1–4.8)
LYMPHOCYTES NFR BLD AUTO: 26.7 %
MCH RBC QN AUTO: 29.7 PG (ref 26–33)
MCHC RBC AUTO-ENTMCNC: 32.5 GM/DL (ref 32.2–35.5)
MCV RBC AUTO: 91.4 FL (ref 81–99)
MICROALBUMIN UR-MCNC: < 1.2 MG/DL
MICROALBUMIN/CREAT RATIO PNL UR: 5 MG/G (ref 0–30)
MONOCYTES ABSOLUTE: 0.4 THOU/MM3 (ref 0.4–1.3)
MONOCYTES NFR BLD AUTO: 6.3 %
NEUTROPHILS ABSOLUTE: 3.6 THOU/MM3 (ref 1.8–7.7)
NEUTROPHILS NFR BLD AUTO: 62.1 %
NRBC BLD AUTO-RTO: 0 /100 WBC
PLATELET # BLD AUTO: 245 THOU/MM3 (ref 130–400)
PMV BLD AUTO: 11.5 FL (ref 9.4–12.4)
POTASSIUM SERPL-SCNC: 4.1 MEQ/L (ref 3.5–5.2)
PROT SERPL-MCNC: 7.1 G/DL (ref 6.1–8)
RBC # BLD AUTO: 5.02 MILL/MM3 (ref 4.2–5.4)
SODIUM SERPL-SCNC: 139 MEQ/L (ref 135–145)
TRIGL SERPL-MCNC: 129 MG/DL (ref 0–199)
TSH SERPL DL<=0.005 MIU/L-ACNC: 2.4 UIU/ML (ref 0.4–4.2)
WBC # BLD AUTO: 5.8 THOU/MM3 (ref 4.8–10.8)

## 2024-11-19 PROCEDURE — 36415 COLL VENOUS BLD VENIPUNCTURE: CPT

## 2024-11-19 PROCEDURE — 83036 HEMOGLOBIN GLYCOSYLATED A1C: CPT

## 2024-11-19 PROCEDURE — 80053 COMPREHEN METABOLIC PANEL: CPT

## 2024-11-19 PROCEDURE — 84443 ASSAY THYROID STIM HORMONE: CPT

## 2024-11-19 PROCEDURE — 85025 COMPLETE CBC W/AUTO DIFF WBC: CPT

## 2024-11-19 PROCEDURE — 80061 LIPID PANEL: CPT

## 2024-11-19 PROCEDURE — 82043 UR ALBUMIN QUANTITATIVE: CPT

## 2024-11-20 ENCOUNTER — TELEPHONE (OUTPATIENT)
Dept: FAMILY MEDICINE CLINIC | Age: 74
End: 2024-11-20

## 2024-11-20 NOTE — TELEPHONE ENCOUNTER
----- Message from Dr. Raoul Gallagher, DO sent at 11/19/2024  6:56 PM EST -----  Please let pt know that labs overall look good  A1c stable at 5.9, in the mild prediabetic range  Con't with plan from office  Let me know if questions, thanks!

## 2025-01-03 DIAGNOSIS — M85.80 OSTEOPENIA, UNSPECIFIED LOCATION: ICD-10-CM

## 2025-01-03 RX ORDER — CALCIUM CARBONATE/VITAMIN D3 500 MG-10
1 TABLET ORAL 2 TIMES DAILY
Qty: 180 TABLET | Refills: 0 | Status: SHIPPED | OUTPATIENT
Start: 2025-01-03

## 2025-01-03 NOTE — TELEPHONE ENCOUNTER
Recent Visits  Date Type Provider Dept   11/06/24 Office Visit Raoul Gallagher, DO [facility]   11/02/23 Office Visit Raoul Gallagher, DO [facility]   Showing recent visits within past 540 days with a meds authorizing provider and meeting all other requirements  Future Appointments  Date Type Provider Dept   02/06/25 Appointment Raoul Gallagher, DO [facility]   Showing future appointments within next 150 days with a meds authorizing provider and meeting all other requirements

## 2025-01-23 DIAGNOSIS — F41.9 ANXIETY: ICD-10-CM

## 2025-01-23 RX ORDER — CITALOPRAM HYDROBROMIDE 20 MG/1
20 TABLET ORAL DAILY
Qty: 90 TABLET | Refills: 3 | Status: SHIPPED | OUTPATIENT
Start: 2025-01-23

## 2025-02-07 DIAGNOSIS — I10 HYPERTENSION, ESSENTIAL: ICD-10-CM

## 2025-02-07 RX ORDER — HYDROCHLOROTHIAZIDE 12.5 MG/1
12.5 TABLET ORAL EVERY MORNING
Qty: 90 TABLET | Refills: 3 | Status: SHIPPED | OUTPATIENT
Start: 2025-02-07

## 2025-02-07 NOTE — TELEPHONE ENCOUNTER
Recent Visits  Date Type Provider Dept   11/06/24 Office Visit Raoul Gallagher, DO [facility]   11/02/23 Office Visit Raoul Gallagher, DO [facility]   Showing recent visits within past 540 days with a meds authorizing provider and meeting all other requirements  Future Appointments  Date Type Provider Dept   02/11/25 Appointment Raoul Gallagher, DO [facility]   Showing future appointments within next 150 days with a meds authorizing provider and meeting all other requirements

## 2025-02-10 NOTE — PROGRESS NOTES
5/15/2025  2:20 PM Raoul Gallagher DO [facility]   11/20/2025 10:20 AM Raoul Gallagher DO [facility]     PATIENT COUNSELING    Counseling was provided today regarding the following topics: Healthy eating habits, Regular exercise, substance abuse and healthy sleep habits.    Barriers to learning and self management: none    Discussed use, benefit, and side effects of prescribed medications.  Barriers to medication compliance addressed.  All patient questions answered.  Pt voiced understanding.       Electronically signed by Raoul Gallagher DO on 2/11/2025 at 2:17 PM

## 2025-02-11 ENCOUNTER — OFFICE VISIT (OUTPATIENT)
Dept: FAMILY MEDICINE CLINIC | Age: 75
End: 2025-02-11

## 2025-02-11 VITALS
TEMPERATURE: 97.8 F | WEIGHT: 220 LBS | RESPIRATION RATE: 16 BRPM | BODY MASS INDEX: 40.48 KG/M2 | HEART RATE: 79 BPM | HEIGHT: 62 IN | DIASTOLIC BLOOD PRESSURE: 76 MMHG | SYSTOLIC BLOOD PRESSURE: 128 MMHG | OXYGEN SATURATION: 98 %

## 2025-02-11 DIAGNOSIS — E78.5 DYSLIPIDEMIA: Chronic | ICD-10-CM

## 2025-02-11 DIAGNOSIS — F41.9 ANXIETY: Chronic | ICD-10-CM

## 2025-02-11 DIAGNOSIS — E66.01 MORBID OBESITY WITH BMI OF 40.0-44.9, ADULT: Primary | ICD-10-CM

## 2025-02-11 DIAGNOSIS — M85.80 OSTEOPENIA, UNSPECIFIED LOCATION: ICD-10-CM

## 2025-02-11 DIAGNOSIS — I10 HYPERTENSION, ESSENTIAL: Chronic | ICD-10-CM

## 2025-02-11 DIAGNOSIS — F17.210 CIGARETTE NICOTINE DEPENDENCE WITHOUT COMPLICATION: ICD-10-CM

## 2025-02-11 DIAGNOSIS — R73.9 HYPERGLYCEMIA: ICD-10-CM

## 2025-02-11 SDOH — ECONOMIC STABILITY: FOOD INSECURITY: WITHIN THE PAST 12 MONTHS, YOU WORRIED THAT YOUR FOOD WOULD RUN OUT BEFORE YOU GOT MONEY TO BUY MORE.: NEVER TRUE

## 2025-02-11 SDOH — ECONOMIC STABILITY: FOOD INSECURITY: WITHIN THE PAST 12 MONTHS, THE FOOD YOU BOUGHT JUST DIDN'T LAST AND YOU DIDN'T HAVE MONEY TO GET MORE.: NEVER TRUE

## 2025-02-11 ASSESSMENT — PATIENT HEALTH QUESTIONNAIRE - PHQ9
SUM OF ALL RESPONSES TO PHQ QUESTIONS 1-9: 0
1. LITTLE INTEREST OR PLEASURE IN DOING THINGS: NOT AT ALL
2. FEELING DOWN, DEPRESSED OR HOPELESS: NOT AT ALL
SUM OF ALL RESPONSES TO PHQ QUESTIONS 1-9: 0
SUM OF ALL RESPONSES TO PHQ QUESTIONS 1-9: 0
SUM OF ALL RESPONSES TO PHQ9 QUESTIONS 1 & 2: 0
SUM OF ALL RESPONSES TO PHQ QUESTIONS 1-9: 0

## 2025-03-04 NOTE — TELEPHONE ENCOUNTER
Fax received from [facility] regarding Pt  Pt location at SN[facility]: [address]  Fax sent by: RN    Fax regarding concern: Labs from 3/3              Any recommendations or new orders?     Patient informed and verbalized understanding.

## 2025-04-11 DIAGNOSIS — M85.80 OSTEOPENIA, UNSPECIFIED LOCATION: ICD-10-CM

## 2025-04-11 RX ORDER — CALCIUM CARBONATE/VITAMIN D3 500 MG-10
1 TABLET ORAL 2 TIMES DAILY
Qty: 180 TABLET | Refills: 3 | Status: SHIPPED | OUTPATIENT
Start: 2025-04-11

## 2025-04-11 NOTE — TELEPHONE ENCOUNTER
Recent Visits  Date Type Provider Dept   02/11/25 Office Visit Raoul Gallagher, DO [facility]   11/06/24 Office Visit Raoul Gallagher, DO [facility]   11/02/23 Office Visit Raoul Gallagher, DO [facility]   Showing recent visits within past 540 days with a meds authorizing provider and meeting all other requirements  Future Appointments  Date Type Provider Dept   05/15/25 Appointment Raoul Gallagher, DO [facility]   Showing future appointments within next 150 days with a meds authorizing provider and meeting all other requirements

## 2025-04-16 ENCOUNTER — TELEPHONE (OUTPATIENT)
Dept: FAMILY MEDICINE CLINIC | Age: 75
End: 2025-04-16

## 2025-04-16 DIAGNOSIS — R73.9 HYPERGLYCEMIA: Primary | ICD-10-CM

## 2025-04-17 NOTE — TELEPHONE ENCOUNTER
Pt informed of labs needing done. Pt voiced understanding with no further questions at this time.       Labs mailed to  pt.

## 2025-04-17 NOTE — TELEPHONE ENCOUNTER
Pt due for fasting labs prior to next apt on 5/15/2025. Please call to have pt complete this. Thanks!    ASSESSMENT & PLAN   Diagnosis Orders   1. Hyperglycemia  Glucose, Random    Hemoglobin A1C        Future Appointments   Date Time Provider Department Center   5/15/2025  2:20 PM Raoul Gallagher, DO [facility]   11/20/2025 10:20 AM Raoul Gallagher, DO [facility]

## 2025-05-01 ENCOUNTER — RESULTS FOLLOW-UP (OUTPATIENT)
Dept: FAMILY MEDICINE CLINIC | Age: 75
End: 2025-05-01

## 2025-05-01 ENCOUNTER — HOSPITAL ENCOUNTER (OUTPATIENT)
Age: 75
Discharge: HOME | End: 2025-05-01
Payer: MEDICARE

## 2025-05-01 DIAGNOSIS — R73.9 HYPERGLYCEMIA: ICD-10-CM

## 2025-05-01 LAB
DEPRECATED MEAN GLUCOSE BLD GHB EST-ACNC: 123 MG/DL (ref 70–126)
GLUCOSE SERPL-MCNC: 105 MG/DL (ref 74–109)
HBA1C MFR BLD HPLC: 6.1 % (ref 4–6)

## 2025-05-01 PROCEDURE — 83036 HEMOGLOBIN GLYCOSYLATED A1C: CPT

## 2025-05-01 PROCEDURE — 36415 COLL VENOUS BLD VENIPUNCTURE: CPT

## 2025-05-01 PROCEDURE — 82947 ASSAY GLUCOSE BLOOD QUANT: CPT

## 2025-05-14 NOTE — PROGRESS NOTES
supple and non-tender without mass, no thyromegaly or thyroid nodules, no cervical lymphadenopathy  Pulmonary/Chest: clear to auscultation bilaterally- no wheezes, rales or rhonchi, normal air movement, no respiratory distress or retractions  Cardiovascular: S1 and S2 auscultated w/ RRR. No murmurs, rubs, clicks, or gallops, distal pulses intact.  Abdomen: soft, non-tender, non-distended, bowel sounds physiologic,  no rebound or guarding, no masses or hernias noted. Liver and spleen without enlargement.   Extremities: no cyanosis, clubbing or edema of the lower extremities.   Skin: warm and dry, no rash or erythema      ASSESSMENT & PLAN  1. Obesity (BMI 30-39.9)    Con't off GLP1  Con't calorie restricted diet/exercise  F/u NOV    2. Hypertension, essential    Stable  At goal  Con't Norvasc/HCTZ    3. Dyslipidemia    Stable  Con't Lipitor    4. Anxiety    Stable  Con't Celexa    5. Prediabetes    Lifestyle changes  Labs in OCT, in call back cue    6. Hyperglycemia      7. Osteopenia, unspecified location    Stable  Con't Ashkan with D  UTD DEXA    8. Cigarette nicotine dependence without complication    In precontemplation stage and not ready to quit. Declines cessation, aware of risks of continued smoking as well as resources available to help quit. These include tobacco cessation classes as well as 1-800-QUIT NOW. No barriers other than lack of desire. 3+ min spent counseling.       DISPOSITION    Return if symptoms worsen or fail to improve.    Agueda released without restrictions.    Future Appointments   Date Time Provider Department Center   11/20/2025 10:20 AM Raoul Gallagher,  [facility]     PATIENT COUNSELING    Counseling was provided today regarding the following topics: Healthy eating habits, Regular exercise, substance abuse and healthy sleep habits.    Barriers to learning and self management: none    Discussed use, benefit, and side effects of prescribed medications.  Barriers to

## 2025-05-15 ENCOUNTER — OFFICE VISIT (OUTPATIENT)
Dept: FAMILY MEDICINE CLINIC | Age: 75
End: 2025-05-15

## 2025-05-15 VITALS
OXYGEN SATURATION: 96 % | BODY MASS INDEX: 39.08 KG/M2 | RESPIRATION RATE: 16 BRPM | TEMPERATURE: 97.7 F | SYSTOLIC BLOOD PRESSURE: 128 MMHG | HEART RATE: 81 BPM | DIASTOLIC BLOOD PRESSURE: 80 MMHG | HEIGHT: 62 IN | WEIGHT: 212.4 LBS

## 2025-05-15 DIAGNOSIS — F17.210 CIGARETTE NICOTINE DEPENDENCE WITHOUT COMPLICATION: ICD-10-CM

## 2025-05-15 DIAGNOSIS — E66.9 OBESITY (BMI 30-39.9): Primary | ICD-10-CM

## 2025-05-15 DIAGNOSIS — M85.80 OSTEOPENIA, UNSPECIFIED LOCATION: ICD-10-CM

## 2025-05-15 DIAGNOSIS — I10 HYPERTENSION, ESSENTIAL: Chronic | ICD-10-CM

## 2025-05-15 DIAGNOSIS — F41.9 ANXIETY: Chronic | ICD-10-CM

## 2025-05-15 DIAGNOSIS — E78.5 DYSLIPIDEMIA: Chronic | ICD-10-CM

## 2025-05-15 DIAGNOSIS — R73.03 PREDIABETES: ICD-10-CM

## 2025-05-15 DIAGNOSIS — R73.9 HYPERGLYCEMIA: ICD-10-CM

## 2025-07-25 ENCOUNTER — TELEPHONE (OUTPATIENT)
Dept: FAMILY MEDICINE CLINIC | Age: 75
End: 2025-07-25

## 2025-07-25 DIAGNOSIS — M17.11 PRIMARY OSTEOARTHRITIS OF RIGHT KNEE: Primary | ICD-10-CM

## 2025-07-25 RX ORDER — TRAMADOL HYDROCHLORIDE 50 MG/1
50 TABLET ORAL EVERY 8 HOURS PRN
Qty: 15 TABLET | Refills: 0 | Status: SHIPPED | OUTPATIENT
Start: 2025-07-25 | End: 2025-07-30

## 2025-07-25 NOTE — TELEPHONE ENCOUNTER
I'd need to see and examine the knee to be able to order any type of imaging.     Could see her Monday at 11 AM    I will send in some medication for pain this weekend. If knee worsens over the weekend, go to ER    Let me know if questions, thanks!    ASSESSMENT & PLAN   Diagnosis Orders   1. Primary osteoarthritis of right knee  traMADol (ULTRAM) 50 MG tablet          OAARS reviewed and appropriate.     Controlled Substances Monitoring: Periodic Controlled Substance Monitoring: Possible medication side effects, risk of tolerance/dependence & alternative treatments discussed., No signs of potential drug abuse or diversion identified. (Raoul Gallagher DO)      Future Appointments   Date Time Provider Department Center   11/20/2025 10:20 AM Raoul Gallagher DO [facility]

## 2025-07-25 NOTE — TELEPHONE ENCOUNTER
Patient called complaining of right knee pain. She is unable to get in with her ortho provider in [place] until 08/08/2025. She states her knee is blue and swollen. She was wondering if you can send something in for pain? Also she said the ortho placed told her to have her family dr order a x-ray, mri and CT for her knee.     Please advise

## 2025-07-25 NOTE — TELEPHONE ENCOUNTER
Pt informed of note . Pt voiced understanding with no further questions at this time.     Future Appointments   Date Time Provider Department Center   7/28/2025 11:00 AM Raoul Gallagher, DO [facility]   11/20/2025 10:20 AM Raoul Gallagher, DO [facility]

## 2025-07-27 NOTE — PROGRESS NOTES
retractions  Cardiovascular: S1 and S2 auscultated w/ RRR. No murmurs, rubs, clicks, or gallops, distal pulses intact.  Abdomen: soft, non-tender, non-distended, bowel sounds physiologic,  no rebound or guarding, no masses or hernias noted. Liver and spleen without enlargement.   Extremities: no cyanosis, clubbing or edema of the lower extremities.   Skin: warm and dry, no rash or erythema  R KNEE EXAM:  Examination of the right  knee shows:   The alignment of the knee is neutral.   There is not erythema.   There mild soft tissue swelling.  There is mild effusion.  ROM-  Extension 5           -   Flexion  90   There moderate pain associated with ROM testing.   Medial joint line moderate tender to palpation.   Lateral joint line moderate tender to palpation.   Retro patellar crepitus is present.   There is mild crepitus along the joint line with ROM testing.   Varus Stress testing does not produce pain,                                     does not show laxity.   Valgus Stress testing does not produce pain,                                       does not show laxity.  Lachman's test is negative.    Anterior Drawer test is Negative.   Posterior Drawer test is Negative  Quintin's Test is Negative.   Patellar Compression testing does not produce pain.  Extensor Mechanism is  intact.       ASSESSMENT & PLAN  1. Primary osteoarthritis of right knee    Acute on chronic pain with known hardware  No s/s infection on hx and exam    Plan:  Keep apt with [facility] in [place] 8/8  Con't prn Tylenol and Tramadol. May call for RF  Xray  CT  Reviewed ER precautions, pt understands.     - XR KNEE RIGHT (MIN 4 VIEWS); Future  - CT KNEE RIGHT WO CONTRAST; Future    2. History of total right knee replacement    - CT KNEE RIGHT WO CONTRAST; Future    3. Cigarette nicotine dependence without complication    In precontemplation stage and not ready to quit. Declines cessation, aware of risks of continued smoking as well as resources

## 2025-07-28 ENCOUNTER — TELEPHONE (OUTPATIENT)
Dept: FAMILY MEDICINE CLINIC | Age: 75
End: 2025-07-28

## 2025-07-28 ENCOUNTER — RESULTS FOLLOW-UP (OUTPATIENT)
Dept: FAMILY MEDICINE CLINIC | Age: 75
End: 2025-07-28

## 2025-07-28 ENCOUNTER — HOSPITAL ENCOUNTER (OUTPATIENT)
Age: 75
Discharge: HOME | End: 2025-07-28
Payer: MEDICARE

## 2025-07-28 ENCOUNTER — OFFICE VISIT (OUTPATIENT)
Dept: FAMILY MEDICINE CLINIC | Age: 75
End: 2025-07-28

## 2025-07-28 ENCOUNTER — HOSPITAL ENCOUNTER (OUTPATIENT)
Dept: GENERAL RADIOLOGY | Age: 75
Discharge: HOME | End: 2025-07-28
Payer: MEDICARE

## 2025-07-28 ENCOUNTER — HOSPITAL ENCOUNTER (OUTPATIENT)
Dept: CT IMAGING | Age: 75
Discharge: HOME | End: 2025-07-28
Attending: FAMILY MEDICINE
Payer: MEDICARE

## 2025-07-28 VITALS
OXYGEN SATURATION: 97 % | BODY MASS INDEX: 39.82 KG/M2 | SYSTOLIC BLOOD PRESSURE: 130 MMHG | RESPIRATION RATE: 16 BRPM | HEART RATE: 68 BPM | WEIGHT: 216.4 LBS | HEIGHT: 62 IN | DIASTOLIC BLOOD PRESSURE: 76 MMHG | TEMPERATURE: 97.6 F

## 2025-07-28 DIAGNOSIS — Z96.651 HISTORY OF TOTAL RIGHT KNEE REPLACEMENT: ICD-10-CM

## 2025-07-28 DIAGNOSIS — M17.11 PRIMARY OSTEOARTHRITIS OF RIGHT KNEE: ICD-10-CM

## 2025-07-28 DIAGNOSIS — M17.11 PRIMARY OSTEOARTHRITIS OF RIGHT KNEE: Primary | ICD-10-CM

## 2025-07-28 DIAGNOSIS — F17.210 CIGARETTE NICOTINE DEPENDENCE WITHOUT COMPLICATION: ICD-10-CM

## 2025-07-28 PROCEDURE — 73562 X-RAY EXAM OF KNEE 3: CPT

## 2025-07-28 PROCEDURE — 73700 CT LOWER EXTREMITY W/O DYE: CPT

## 2025-07-28 NOTE — TELEPHONE ENCOUNTER
Pt informed of x-ray results . Pt voiced understanding with no further questions at this time.     Had CT done today as well, results are still in progress

## 2025-07-28 NOTE — TELEPHONE ENCOUNTER
Message  Received: Today  Raoul Gallagher,   [facility] Clinical Staff    Please let pt know that CT of the knee looked good.  No concerning findings.  Keep apt with ortho.    Staff, please fax xray and CT report to [facility] in [place].    Let me know if questions, thanks!

## 2025-07-28 NOTE — TELEPHONE ENCOUNTER
Pt informed of results . Pt voiced understanding with no further questions at this time.     Faxed the results to [facility] [phone]

## (undated) DEVICE — COTTON BALL ST

## (undated) DEVICE — GAUZE SPONGES,USP TYPE VII GAUZE, 12 PLY: Brand: CURITY

## (undated) DEVICE — NEEDLE HYPO 18GA L1.5IN THN WALL PIVOTING SHLD BVL ORIENTED

## (undated) DEVICE — 1840 FOAM BLOCK NEEDLE COUNTER: Brand: DEVON

## (undated) DEVICE — NEEDLE SPNL 22GA L3.5IN BLK HUB S STL REG WALL FIT STYL W/

## (undated) DEVICE — TOWEL,OR,DSP,ST,BLUE,STD,4/PK,20PK/CS: Brand: MEDLINE

## (undated) DEVICE — GLOVE BIOGEL POWDER FREE SZ 8

## (undated) DEVICE — BRACE WALKING EMBEDDED SOLE MED STD ANK ROCKER BTM INLINE

## (undated) DEVICE — GLOVE ORANGE PI 7   MSG9070

## (undated) DEVICE — GLOVE SURG SZ 8 L11.77IN FNGR THK9.8MIL STRW LTX POLYMER

## (undated) DEVICE — BANDAGE,GAUZE,4.5"X4.1YD,STERILE,LF: Brand: MEDLINE

## (undated) DEVICE — SYRINGE MED 10ML LUERLOCK TIP W/O SFTY DISP

## (undated) DEVICE — 6 ML SYRINGE LUER-LOCK TIP: Brand: MONOJECT

## (undated) DEVICE — MARKER,SKIN,WI/RULER AND LABELS: Brand: MEDLINE

## (undated) DEVICE — PACK PROCEDURE SURG PLAS SC MIN SRHP LF

## (undated) DEVICE — SPONGE GZ W4XL4IN COT 12 PLY TYP VII WVN C FLD DSGN

## (undated) DEVICE — STERILE COTTON BALLS LARGE 5/P: Brand: MEDLINE

## (undated) DEVICE — HYPODERMIC SAFETY NEEDLE: Brand: MAGELLAN

## (undated) DEVICE — SUTURE MCRYL SZ 4-0 L18IN ABSRB UD P-3 L13MM 3/8 CIR PRIM Y494G

## (undated) DEVICE — SYRINGE MED 3ML CLR PLAS STD N CTRL LUERLOCK TIP DISP

## (undated) DEVICE — BANDAGE COMPR M W4INXL10YD WHT BGE VELC E MTRX HK AND LOOP

## (undated) DEVICE — APPLICATOR MEDICATED 26 CC SOLUTION CLR STRL CHLORAPREP

## (undated) DEVICE — SOLUTION IV 1000ML 0.9% SOD CHL PH 5 INJ USP VIAFLX PLAS